# Patient Record
Sex: FEMALE | Race: WHITE | NOT HISPANIC OR LATINO
[De-identification: names, ages, dates, MRNs, and addresses within clinical notes are randomized per-mention and may not be internally consistent; named-entity substitution may affect disease eponyms.]

---

## 2017-01-23 ENCOUNTER — NON-APPOINTMENT (OUTPATIENT)
Age: 47
End: 2017-01-23

## 2017-01-23 ENCOUNTER — APPOINTMENT (OUTPATIENT)
Dept: CARDIOLOGY | Facility: CLINIC | Age: 47
End: 2017-01-23

## 2017-01-23 VITALS — WEIGHT: 293 LBS | BODY MASS INDEX: 50.02 KG/M2 | OXYGEN SATURATION: 94 % | HEIGHT: 64 IN | HEART RATE: 108 BPM

## 2017-01-23 VITALS — SYSTOLIC BLOOD PRESSURE: 110 MMHG | DIASTOLIC BLOOD PRESSURE: 70 MMHG

## 2017-01-23 DIAGNOSIS — E78.5 HYPERLIPIDEMIA, UNSPECIFIED: ICD-10-CM

## 2017-01-27 ENCOUNTER — INPATIENT (INPATIENT)
Facility: HOSPITAL | Age: 47
LOS: 3 days | Discharge: HOME | DRG: 202 | End: 2017-01-31
Attending: INTERNAL MEDICINE | Admitting: INTERNAL MEDICINE
Payer: COMMERCIAL

## 2017-01-27 VITALS
TEMPERATURE: 98 F | HEART RATE: 119 BPM | OXYGEN SATURATION: 94 % | HEIGHT: 64 IN | RESPIRATION RATE: 24 BRPM | WEIGHT: 293 LBS | DIASTOLIC BLOOD PRESSURE: 82 MMHG | SYSTOLIC BLOOD PRESSURE: 113 MMHG

## 2017-01-27 DIAGNOSIS — J45.901 UNSPECIFIED ASTHMA WITH (ACUTE) EXACERBATION: ICD-10-CM

## 2017-01-27 LAB
ALBUMIN SERPL ELPH-MCNC: 3.1 G/DL — LOW (ref 3.3–5)
ALP SERPL-CCNC: 126 U/L — HIGH (ref 40–120)
ALT FLD-CCNC: 41 U/L — SIGNIFICANT CHANGE UP (ref 12–78)
ANION GAP SERPL CALC-SCNC: 12 MMOL/L — SIGNIFICANT CHANGE UP (ref 5–17)
APTT BLD: 31.8 SEC — SIGNIFICANT CHANGE UP (ref 27.5–37.4)
AST SERPL-CCNC: 19 U/L — SIGNIFICANT CHANGE UP (ref 15–37)
BASOPHILS # BLD AUTO: 0.1 K/UL — SIGNIFICANT CHANGE UP (ref 0–0.2)
BASOPHILS NFR BLD AUTO: 0.9 % — SIGNIFICANT CHANGE UP (ref 0–2)
BILIRUB SERPL-MCNC: 0.5 MG/DL — SIGNIFICANT CHANGE UP (ref 0.2–1.2)
BUN SERPL-MCNC: 14 MG/DL — SIGNIFICANT CHANGE UP (ref 7–23)
CALCIUM SERPL-MCNC: 8.4 MG/DL — LOW (ref 8.5–10.1)
CHLORIDE SERPL-SCNC: 97 MMOL/L — SIGNIFICANT CHANGE UP (ref 96–108)
CO2 SERPL-SCNC: 24 MMOL/L — SIGNIFICANT CHANGE UP (ref 22–31)
CREAT SERPL-MCNC: 0.64 MG/DL — SIGNIFICANT CHANGE UP (ref 0.5–1.3)
EOSINOPHIL # BLD AUTO: 0.3 K/UL — SIGNIFICANT CHANGE UP (ref 0–0.5)
EOSINOPHIL NFR BLD AUTO: 2.8 % — SIGNIFICANT CHANGE UP (ref 0–6)
GLUCOSE SERPL-MCNC: 128 MG/DL — HIGH (ref 70–99)
HCT VFR BLD CALC: 41 % — SIGNIFICANT CHANGE UP (ref 34.5–45)
HGB BLD-MCNC: 13.3 G/DL — SIGNIFICANT CHANGE UP (ref 11.5–15.5)
INR BLD: 1.2 RATIO — HIGH (ref 0.88–1.16)
LYMPHOCYTES # BLD AUTO: 1.8 K/UL — SIGNIFICANT CHANGE UP (ref 1–3.3)
LYMPHOCYTES # BLD AUTO: 15.6 % — SIGNIFICANT CHANGE UP (ref 13–44)
MCHC RBC-ENTMCNC: 26.8 PG — LOW (ref 27–34)
MCHC RBC-ENTMCNC: 32.5 GM/DL — SIGNIFICANT CHANGE UP (ref 32–36)
MCV RBC AUTO: 82.4 FL — SIGNIFICANT CHANGE UP (ref 80–100)
MONOCYTES # BLD AUTO: 0.5 K/UL — SIGNIFICANT CHANGE UP (ref 0–0.9)
MONOCYTES NFR BLD AUTO: 4.1 % — SIGNIFICANT CHANGE UP (ref 1–9)
NEUTROPHILS # BLD AUTO: 8.8 K/UL — HIGH (ref 1.8–7.4)
NEUTROPHILS NFR BLD AUTO: 76.6 % — SIGNIFICANT CHANGE UP (ref 43–77)
PLATELET # BLD AUTO: 360 K/UL — SIGNIFICANT CHANGE UP (ref 150–400)
POTASSIUM SERPL-MCNC: 3.7 MMOL/L — SIGNIFICANT CHANGE UP (ref 3.5–5.3)
POTASSIUM SERPL-SCNC: 3.7 MMOL/L — SIGNIFICANT CHANGE UP (ref 3.5–5.3)
PROT SERPL-MCNC: 6.8 G/DL — SIGNIFICANT CHANGE UP (ref 6–8.3)
PROTHROM AB SERPL-ACNC: 13.3 SEC — HIGH (ref 10–13.1)
RAPID RVP RESULT: SIGNIFICANT CHANGE UP
RBC # BLD: 4.98 M/UL — SIGNIFICANT CHANGE UP (ref 3.8–5.2)
RBC # FLD: 14 % — SIGNIFICANT CHANGE UP (ref 10.3–14.5)
SODIUM SERPL-SCNC: 133 MMOL/L — LOW (ref 135–145)
WBC # BLD: 11.4 K/UL — HIGH (ref 3.8–10.5)
WBC # FLD AUTO: 11.4 K/UL — HIGH (ref 3.8–10.5)

## 2017-01-27 PROCEDURE — 99285 EMERGENCY DEPT VISIT HI MDM: CPT

## 2017-01-27 PROCEDURE — 93010 ELECTROCARDIOGRAM REPORT: CPT

## 2017-01-27 PROCEDURE — 71010: CPT | Mod: 26

## 2017-01-27 RX ORDER — DEXTROSE 50 % IN WATER 50 %
25 SYRINGE (ML) INTRAVENOUS ONCE
Qty: 0 | Refills: 0 | Status: DISCONTINUED | OUTPATIENT
Start: 2017-01-27 | End: 2017-01-31

## 2017-01-27 RX ORDER — DEXTROSE 50 % IN WATER 50 %
1 SYRINGE (ML) INTRAVENOUS ONCE
Qty: 0 | Refills: 0 | Status: DISCONTINUED | OUTPATIENT
Start: 2017-01-27 | End: 2017-01-31

## 2017-01-27 RX ORDER — SIMVASTATIN 20 MG/1
20 TABLET, FILM COATED ORAL AT BEDTIME
Qty: 0 | Refills: 0 | Status: DISCONTINUED | OUTPATIENT
Start: 2017-01-27 | End: 2017-01-31

## 2017-01-27 RX ORDER — INSULIN GLARGINE 100 [IU]/ML
55 INJECTION, SOLUTION SUBCUTANEOUS EVERY MORNING
Qty: 0 | Refills: 0 | Status: DISCONTINUED | OUTPATIENT
Start: 2017-01-28 | End: 2017-01-31

## 2017-01-27 RX ORDER — ALBUTEROL 90 UG/1
2.5 AEROSOL, METERED ORAL ONCE
Qty: 0 | Refills: 0 | Status: COMPLETED | OUTPATIENT
Start: 2017-01-27 | End: 2017-01-27

## 2017-01-27 RX ORDER — INSULIN LISPRO 100/ML
VIAL (ML) SUBCUTANEOUS
Qty: 0 | Refills: 0 | Status: DISCONTINUED | OUTPATIENT
Start: 2017-01-27 | End: 2017-01-31

## 2017-01-27 RX ORDER — LISINOPRIL 2.5 MG/1
20 TABLET ORAL DAILY
Qty: 0 | Refills: 0 | Status: DISCONTINUED | OUTPATIENT
Start: 2017-01-27 | End: 2017-01-27

## 2017-01-27 RX ORDER — IPRATROPIUM/ALBUTEROL SULFATE 18-103MCG
3 AEROSOL WITH ADAPTER (GRAM) INHALATION ONCE
Qty: 0 | Refills: 0 | Status: COMPLETED | OUTPATIENT
Start: 2017-01-27 | End: 2017-01-27

## 2017-01-27 RX ORDER — ENOXAPARIN SODIUM 100 MG/ML
40 INJECTION SUBCUTANEOUS EVERY 24 HOURS
Qty: 0 | Refills: 0 | Status: DISCONTINUED | OUTPATIENT
Start: 2017-01-27 | End: 2017-01-29

## 2017-01-27 RX ORDER — MAGNESIUM SULFATE 500 MG/ML
2 VIAL (ML) INJECTION ONCE
Qty: 0 | Refills: 0 | Status: COMPLETED | OUTPATIENT
Start: 2017-01-27 | End: 2017-01-27

## 2017-01-27 RX ORDER — CARVEDILOL PHOSPHATE 80 MG/1
25 CAPSULE, EXTENDED RELEASE ORAL EVERY 12 HOURS
Qty: 0 | Refills: 0 | Status: DISCONTINUED | OUTPATIENT
Start: 2017-01-27 | End: 2017-01-28

## 2017-01-27 RX ORDER — ALBUTEROL 90 UG/1
2.5 AEROSOL, METERED ORAL
Qty: 0 | Refills: 0 | Status: DISCONTINUED | OUTPATIENT
Start: 2017-01-27 | End: 2017-01-31

## 2017-01-27 RX ORDER — CEFTRIAXONE 500 MG/1
1 INJECTION, POWDER, FOR SOLUTION INTRAMUSCULAR; INTRAVENOUS ONCE
Qty: 0 | Refills: 0 | Status: COMPLETED | OUTPATIENT
Start: 2017-01-27 | End: 2017-01-27

## 2017-01-27 RX ORDER — INSULIN GLARGINE 100 [IU]/ML
55 INJECTION, SOLUTION SUBCUTANEOUS AT BEDTIME
Qty: 0 | Refills: 0 | Status: DISCONTINUED | OUTPATIENT
Start: 2017-01-27 | End: 2017-01-29

## 2017-01-27 RX ORDER — ALBUTEROL 90 UG/1
2 AEROSOL, METERED ORAL EVERY 6 HOURS
Qty: 0 | Refills: 0 | Status: DISCONTINUED | OUTPATIENT
Start: 2017-01-27 | End: 2017-01-28

## 2017-01-27 RX ORDER — ASPIRIN/CALCIUM CARB/MAGNESIUM 324 MG
81 TABLET ORAL DAILY
Qty: 0 | Refills: 0 | Status: DISCONTINUED | OUTPATIENT
Start: 2017-01-27 | End: 2017-01-31

## 2017-01-27 RX ORDER — SODIUM CHLORIDE 9 MG/ML
1000 INJECTION, SOLUTION INTRAVENOUS
Qty: 0 | Refills: 0 | Status: DISCONTINUED | OUTPATIENT
Start: 2017-01-27 | End: 2017-01-31

## 2017-01-27 RX ORDER — LISINOPRIL 2.5 MG/1
20 TABLET ORAL DAILY
Qty: 0 | Refills: 0 | Status: DISCONTINUED | OUTPATIENT
Start: 2017-01-27 | End: 2017-01-31

## 2017-01-27 RX ORDER — LORATADINE 10 MG/1
10 TABLET ORAL DAILY
Qty: 0 | Refills: 0 | Status: DISCONTINUED | OUTPATIENT
Start: 2017-01-27 | End: 2017-01-31

## 2017-01-27 RX ORDER — MONTELUKAST 4 MG/1
10 TABLET, CHEWABLE ORAL DAILY
Qty: 0 | Refills: 0 | Status: DISCONTINUED | OUTPATIENT
Start: 2017-01-27 | End: 2017-01-31

## 2017-01-27 RX ORDER — PANTOPRAZOLE SODIUM 20 MG/1
40 TABLET, DELAYED RELEASE ORAL
Qty: 0 | Refills: 0 | Status: DISCONTINUED | OUTPATIENT
Start: 2017-01-27 | End: 2017-01-31

## 2017-01-27 RX ORDER — FLUTICASONE PROPIONATE 50 MCG
2 SPRAY, SUSPENSION NASAL
Qty: 0 | Refills: 0 | Status: DISCONTINUED | OUTPATIENT
Start: 2017-01-27 | End: 2017-01-31

## 2017-01-27 RX ORDER — AZITHROMYCIN 500 MG/1
500 TABLET, FILM COATED ORAL ONCE
Qty: 0 | Refills: 0 | Status: COMPLETED | OUTPATIENT
Start: 2017-01-27 | End: 2017-01-27

## 2017-01-27 RX ORDER — SIMVASTATIN 20 MG/1
40 TABLET, FILM COATED ORAL AT BEDTIME
Qty: 0 | Refills: 0 | Status: DISCONTINUED | OUTPATIENT
Start: 2017-01-27 | End: 2017-01-27

## 2017-01-27 RX ORDER — GLUCAGON INJECTION, SOLUTION 0.5 MG/.1ML
1 INJECTION, SOLUTION SUBCUTANEOUS ONCE
Qty: 0 | Refills: 0 | Status: DISCONTINUED | OUTPATIENT
Start: 2017-01-27 | End: 2017-01-31

## 2017-01-27 RX ORDER — DEXTROSE 50 % IN WATER 50 %
12.5 SYRINGE (ML) INTRAVENOUS ONCE
Qty: 0 | Refills: 0 | Status: DISCONTINUED | OUTPATIENT
Start: 2017-01-27 | End: 2017-01-31

## 2017-01-27 RX ORDER — MORPHINE SULFATE 50 MG/1
4 CAPSULE, EXTENDED RELEASE ORAL ONCE
Qty: 0 | Refills: 0 | Status: DISCONTINUED | OUTPATIENT
Start: 2017-01-27 | End: 2017-01-27

## 2017-01-27 RX ADMIN — CARVEDILOL PHOSPHATE 25 MILLIGRAM(S): 80 CAPSULE, EXTENDED RELEASE ORAL at 22:22

## 2017-01-27 RX ADMIN — PANTOPRAZOLE SODIUM 40 MILLIGRAM(S): 20 TABLET, DELAYED RELEASE ORAL at 21:57

## 2017-01-27 RX ADMIN — INSULIN GLARGINE 55 UNIT(S): 100 INJECTION, SOLUTION SUBCUTANEOUS at 21:56

## 2017-01-27 RX ADMIN — ALBUTEROL 2.5 MILLIGRAM(S): 90 AEROSOL, METERED ORAL at 17:52

## 2017-01-27 RX ADMIN — SIMVASTATIN 20 MILLIGRAM(S): 20 TABLET, FILM COATED ORAL at 21:57

## 2017-01-27 RX ADMIN — MONTELUKAST 10 MILLIGRAM(S): 4 TABLET, CHEWABLE ORAL at 21:57

## 2017-01-27 RX ADMIN — LISINOPRIL 20 MILLIGRAM(S): 2.5 TABLET ORAL at 21:56

## 2017-01-27 RX ADMIN — Medication 3 MILLILITER(S): at 17:30

## 2017-01-27 RX ADMIN — ENOXAPARIN SODIUM 40 MILLIGRAM(S): 100 INJECTION SUBCUTANEOUS at 21:57

## 2017-01-27 RX ADMIN — LORATADINE 10 MILLIGRAM(S): 10 TABLET ORAL at 22:22

## 2017-01-27 RX ADMIN — Medication 50 GRAM(S): at 17:30

## 2017-01-27 RX ADMIN — MORPHINE SULFATE 4 MILLIGRAM(S): 50 CAPSULE, EXTENDED RELEASE ORAL at 18:41

## 2017-01-27 RX ADMIN — Medication 81 MILLIGRAM(S): at 21:57

## 2017-01-27 RX ADMIN — ALBUTEROL 2.5 MILLIGRAM(S): 90 AEROSOL, METERED ORAL at 17:51

## 2017-01-27 RX ADMIN — CEFTRIAXONE 100 GRAM(S): 500 INJECTION, POWDER, FOR SOLUTION INTRAMUSCULAR; INTRAVENOUS at 18:18

## 2017-01-27 RX ADMIN — Medication 2 SPRAY(S): at 22:21

## 2017-01-27 RX ADMIN — Medication 40 MILLIGRAM(S): at 17:15

## 2017-01-27 RX ADMIN — ALBUTEROL 2.5 MILLIGRAM(S): 90 AEROSOL, METERED ORAL at 21:17

## 2017-01-27 RX ADMIN — AZITHROMYCIN 255 MILLIGRAM(S): 500 TABLET, FILM COATED ORAL at 18:30

## 2017-01-27 NOTE — ED PROVIDER NOTE - PSH
H/O ovarian cystectomy    History of Cholecystectomy    History of tonsillectomy    Salpingo-oophoritis  oophrectomy  Status Post Nissen Fundoplication (with Gastrostomy Tube Placement)

## 2017-01-27 NOTE — ED PROVIDER NOTE - OBJECTIVE STATEMENT
46 year old female with a PMH of asthma comes to the ER with complaints of shortness of breath. States that she was recently diagnosed with pneumonia. Has been having worsening shortness of breath for the past week despite steroid use and inhaler usage. Patient has a history of 21 intubations in the past for her asthma exacerbations.

## 2017-01-27 NOTE — ED PROVIDER NOTE - PMH
Asthma  intubated in the past  CVA (cerebral vascular accident)    Diabetes mellitus type 2 in obese    Essential hypertension, hypertension with unspecified goal    GERD (gastroesophageal reflux disease)    Migraine    MTHFR (methylene THF reductase) deficiency and homocystinuria    Obesity    Polycystic disease, ovaries    Prinzmetal Angina

## 2017-01-27 NOTE — ED ADULT NURSE NOTE - OBJECTIVE STATEMENT
Pt arrived c/o shortness of breath. Pt has labored breathing, speaking in short sentences. Dr Mcmullen at bedside, respiratory at bedside. Nebulizers given per order. Pt placed on cardiac monitor, on continuous pulse ox. O2 98% on nebs, pt placed on bipap while neb in progress by respiratory. IV started, unable to draw bloods, lab called, will come draw. Ordered medication given, magnesium infusing per order. Close monitoring continues.

## 2017-01-28 DIAGNOSIS — J45.901 UNSPECIFIED ASTHMA WITH (ACUTE) EXACERBATION: ICD-10-CM

## 2017-01-28 LAB
ANION GAP SERPL CALC-SCNC: 11 MMOL/L — SIGNIFICANT CHANGE UP (ref 5–17)
BUN SERPL-MCNC: 17 MG/DL — SIGNIFICANT CHANGE UP (ref 7–23)
CALCIUM SERPL-MCNC: 8.1 MG/DL — LOW (ref 8.5–10.1)
CHLORIDE SERPL-SCNC: 102 MMOL/L — SIGNIFICANT CHANGE UP (ref 96–108)
CO2 SERPL-SCNC: 23 MMOL/L — SIGNIFICANT CHANGE UP (ref 22–31)
CREAT SERPL-MCNC: 0.73 MG/DL — SIGNIFICANT CHANGE UP (ref 0.5–1.3)
GLUCOSE SERPL-MCNC: 340 MG/DL — HIGH (ref 70–99)
GRAM STN FLD: SIGNIFICANT CHANGE UP
HBA1C BLD-MCNC: 8.2 % — HIGH (ref 4–5.6)
HCT VFR BLD CALC: 37.4 % — SIGNIFICANT CHANGE UP (ref 34.5–45)
HGB BLD-MCNC: 12.2 G/DL — SIGNIFICANT CHANGE UP (ref 11.5–15.5)
MAGNESIUM SERPL-MCNC: 2.7 MG/DL — HIGH (ref 1.8–2.4)
MCHC RBC-ENTMCNC: 26.7 PG — LOW (ref 27–34)
MCHC RBC-ENTMCNC: 32.6 GM/DL — SIGNIFICANT CHANGE UP (ref 32–36)
MCV RBC AUTO: 81.9 FL — SIGNIFICANT CHANGE UP (ref 80–100)
PHOSPHATE SERPL-MCNC: 3.7 MG/DL — SIGNIFICANT CHANGE UP (ref 2.5–4.5)
PLATELET # BLD AUTO: 320 K/UL — SIGNIFICANT CHANGE UP (ref 150–400)
POTASSIUM SERPL-MCNC: 4.5 MMOL/L — SIGNIFICANT CHANGE UP (ref 3.5–5.3)
POTASSIUM SERPL-SCNC: 4.5 MMOL/L — SIGNIFICANT CHANGE UP (ref 3.5–5.3)
RBC # BLD: 4.56 M/UL — SIGNIFICANT CHANGE UP (ref 3.8–5.2)
RBC # FLD: 14 % — SIGNIFICANT CHANGE UP (ref 10.3–14.5)
SODIUM SERPL-SCNC: 136 MMOL/L — SIGNIFICANT CHANGE UP (ref 135–145)
SPECIMEN SOURCE: SIGNIFICANT CHANGE UP
WBC # BLD: 9.3 K/UL — SIGNIFICANT CHANGE UP (ref 3.8–10.5)
WBC # FLD AUTO: 9.3 K/UL — SIGNIFICANT CHANGE UP (ref 3.8–10.5)

## 2017-01-28 PROCEDURE — 99291 CRITICAL CARE FIRST HOUR: CPT

## 2017-01-28 RX ORDER — ALBUTEROL 90 UG/1
2.5 AEROSOL, METERED ORAL EVERY 4 HOURS
Qty: 0 | Refills: 0 | Status: DISCONTINUED | OUTPATIENT
Start: 2017-01-28 | End: 2017-01-31

## 2017-01-28 RX ORDER — SODIUM CHLORIDE 9 MG/ML
250 INJECTION INTRAMUSCULAR; INTRAVENOUS; SUBCUTANEOUS ONCE
Qty: 0 | Refills: 0 | Status: COMPLETED | OUTPATIENT
Start: 2017-01-28 | End: 2017-01-28

## 2017-01-28 RX ORDER — CARVEDILOL PHOSPHATE 80 MG/1
25 CAPSULE, EXTENDED RELEASE ORAL AT BEDTIME
Qty: 0 | Refills: 0 | Status: DISCONTINUED | OUTPATIENT
Start: 2017-01-28 | End: 2017-01-30

## 2017-01-28 RX ORDER — INSULIN LISPRO 100/ML
4 VIAL (ML) SUBCUTANEOUS ONCE
Qty: 0 | Refills: 0 | Status: COMPLETED | OUTPATIENT
Start: 2017-01-28 | End: 2017-01-28

## 2017-01-28 RX ORDER — CEFTRIAXONE 500 MG/1
1 INJECTION, POWDER, FOR SOLUTION INTRAMUSCULAR; INTRAVENOUS EVERY 24 HOURS
Qty: 0 | Refills: 0 | Status: DISCONTINUED | OUTPATIENT
Start: 2017-01-28 | End: 2017-01-31

## 2017-01-28 RX ORDER — ALBUTEROL 90 UG/1
2.5 AEROSOL, METERED ORAL
Qty: 0 | Refills: 0 | Status: DISCONTINUED | OUTPATIENT
Start: 2017-01-28 | End: 2017-01-29

## 2017-01-28 RX ADMIN — Medication 6: at 09:30

## 2017-01-28 RX ADMIN — ALBUTEROL 2.5 MILLIGRAM(S): 90 AEROSOL, METERED ORAL at 16:49

## 2017-01-28 RX ADMIN — MONTELUKAST 10 MILLIGRAM(S): 4 TABLET, CHEWABLE ORAL at 21:47

## 2017-01-28 RX ADMIN — Medication 2 SPRAY(S): at 06:08

## 2017-01-28 RX ADMIN — Medication 81 MILLIGRAM(S): at 21:47

## 2017-01-28 RX ADMIN — Medication 4 UNIT(S): at 22:52

## 2017-01-28 RX ADMIN — SIMVASTATIN 20 MILLIGRAM(S): 20 TABLET, FILM COATED ORAL at 21:47

## 2017-01-28 RX ADMIN — ALBUTEROL 2.5 MILLIGRAM(S): 90 AEROSOL, METERED ORAL at 02:23

## 2017-01-28 RX ADMIN — Medication 6: at 17:31

## 2017-01-28 RX ADMIN — Medication 10: at 12:25

## 2017-01-28 RX ADMIN — Medication 40 MILLIGRAM(S): at 09:30

## 2017-01-28 RX ADMIN — Medication 40 MILLIGRAM(S): at 00:27

## 2017-01-28 RX ADMIN — LORATADINE 10 MILLIGRAM(S): 10 TABLET ORAL at 21:47

## 2017-01-28 RX ADMIN — ALBUTEROL 2.5 MILLIGRAM(S): 90 AEROSOL, METERED ORAL at 23:47

## 2017-01-28 RX ADMIN — ENOXAPARIN SODIUM 40 MILLIGRAM(S): 100 INJECTION SUBCUTANEOUS at 21:45

## 2017-01-28 RX ADMIN — ALBUTEROL 2 PUFF(S): 90 AEROSOL, METERED ORAL at 06:09

## 2017-01-28 RX ADMIN — ALBUTEROL 2.5 MILLIGRAM(S): 90 AEROSOL, METERED ORAL at 00:07

## 2017-01-28 RX ADMIN — INSULIN GLARGINE 55 UNIT(S): 100 INJECTION, SOLUTION SUBCUTANEOUS at 21:46

## 2017-01-28 RX ADMIN — ALBUTEROL 2.5 MILLIGRAM(S): 90 AEROSOL, METERED ORAL at 05:21

## 2017-01-28 RX ADMIN — ALBUTEROL 2.5 MILLIGRAM(S): 90 AEROSOL, METERED ORAL at 19:19

## 2017-01-28 RX ADMIN — CEFTRIAXONE 100 GRAM(S): 500 INJECTION, POWDER, FOR SOLUTION INTRAMUSCULAR; INTRAVENOUS at 17:33

## 2017-01-28 RX ADMIN — Medication 40 MILLIGRAM(S): at 17:33

## 2017-01-28 RX ADMIN — PANTOPRAZOLE SODIUM 40 MILLIGRAM(S): 20 TABLET, DELAYED RELEASE ORAL at 06:08

## 2017-01-28 RX ADMIN — Medication 2 SPRAY(S): at 17:32

## 2017-01-28 RX ADMIN — CARVEDILOL PHOSPHATE 25 MILLIGRAM(S): 80 CAPSULE, EXTENDED RELEASE ORAL at 21:47

## 2017-01-28 RX ADMIN — ALBUTEROL 2.5 MILLIGRAM(S): 90 AEROSOL, METERED ORAL at 11:58

## 2017-01-28 RX ADMIN — SODIUM CHLORIDE 500 MILLILITER(S): 9 INJECTION INTRAMUSCULAR; INTRAVENOUS; SUBCUTANEOUS at 02:46

## 2017-01-28 RX ADMIN — INSULIN GLARGINE 55 UNIT(S): 100 INJECTION, SOLUTION SUBCUTANEOUS at 09:30

## 2017-01-28 RX ADMIN — ALBUTEROL 2 PUFF(S): 90 AEROSOL, METERED ORAL at 00:33

## 2017-01-28 RX ADMIN — LISINOPRIL 20 MILLIGRAM(S): 2.5 TABLET ORAL at 21:47

## 2017-01-28 RX ADMIN — SODIUM CHLORIDE 500 MILLILITER(S): 9 INJECTION INTRAMUSCULAR; INTRAVENOUS; SUBCUTANEOUS at 02:25

## 2017-01-28 NOTE — H&P ADULT. - HISTORY OF PRESENT ILLNESS
pt with exacerbation of her asthma has had multiple intubations and frequent hospitalizations. pt is a brittle diabetic as well with poor control

## 2017-01-28 NOTE — DIETITIAN INITIAL EVALUATION ADULT. - OTHER INFO
patient reports is hungry on steroids. 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 states is allergic to artificial sweetners needs regular diet knows what to avoid . states A1c last was 7.5% weight loss over 3 weeks reported 16# cutting portion size per patient .

## 2017-01-28 NOTE — DIETITIAN INITIAL EVALUATION ADULT. - NS AS NUTRI INTERV MEALS SNACK
[place] - modified diet/Fat - modified diet/Other (specify)/advance to clear liquids to dash tlc as appropriate

## 2017-01-29 LAB
ANION GAP SERPL CALC-SCNC: 11 MMOL/L — SIGNIFICANT CHANGE UP (ref 5–17)
BUN SERPL-MCNC: 21 MG/DL — SIGNIFICANT CHANGE UP (ref 7–23)
CALCIUM SERPL-MCNC: 7.9 MG/DL — LOW (ref 8.5–10.1)
CHLORIDE SERPL-SCNC: 106 MMOL/L — SIGNIFICANT CHANGE UP (ref 96–108)
CO2 SERPL-SCNC: 21 MMOL/L — LOW (ref 22–31)
CREAT SERPL-MCNC: 0.68 MG/DL — SIGNIFICANT CHANGE UP (ref 0.5–1.3)
GLUCOSE SERPL-MCNC: 394 MG/DL — HIGH (ref 70–99)
HCT VFR BLD CALC: 35.6 % — SIGNIFICANT CHANGE UP (ref 34.5–45)
HGB BLD-MCNC: 11.6 G/DL — SIGNIFICANT CHANGE UP (ref 11.5–15.5)
LEGIONELLA AG UR QL: NEGATIVE — SIGNIFICANT CHANGE UP
MAGNESIUM SERPL-MCNC: 2.6 MG/DL — HIGH (ref 1.8–2.4)
MCHC RBC-ENTMCNC: 27 PG — SIGNIFICANT CHANGE UP (ref 27–34)
MCHC RBC-ENTMCNC: 32.6 GM/DL — SIGNIFICANT CHANGE UP (ref 32–36)
MCV RBC AUTO: 82.8 FL — SIGNIFICANT CHANGE UP (ref 80–100)
PHOSPHATE SERPL-MCNC: 2.2 MG/DL — LOW (ref 2.5–4.5)
PLATELET # BLD AUTO: 324 K/UL — SIGNIFICANT CHANGE UP (ref 150–400)
POTASSIUM SERPL-MCNC: 4.5 MMOL/L — SIGNIFICANT CHANGE UP (ref 3.5–5.3)
POTASSIUM SERPL-SCNC: 4.5 MMOL/L — SIGNIFICANT CHANGE UP (ref 3.5–5.3)
RBC # BLD: 4.3 M/UL — SIGNIFICANT CHANGE UP (ref 3.8–5.2)
RBC # FLD: 13.9 % — SIGNIFICANT CHANGE UP (ref 10.3–14.5)
SODIUM SERPL-SCNC: 138 MMOL/L — SIGNIFICANT CHANGE UP (ref 135–145)
WBC # BLD: 15.1 K/UL — HIGH (ref 3.8–10.5)
WBC # FLD AUTO: 15.1 K/UL — HIGH (ref 3.8–10.5)

## 2017-01-29 PROCEDURE — 99291 CRITICAL CARE FIRST HOUR: CPT

## 2017-01-29 PROCEDURE — 99233 SBSQ HOSP IP/OBS HIGH 50: CPT

## 2017-01-29 RX ORDER — DIPHENHYDRAMINE HCL 50 MG
25 CAPSULE ORAL ONCE
Qty: 0 | Refills: 0 | Status: COMPLETED | OUTPATIENT
Start: 2017-01-29 | End: 2017-01-29

## 2017-01-29 RX ORDER — INSULIN LISPRO 100/ML
15 VIAL (ML) SUBCUTANEOUS
Qty: 0 | Refills: 0 | Status: DISCONTINUED | OUTPATIENT
Start: 2017-01-29 | End: 2017-01-31

## 2017-01-29 RX ORDER — POTASSIUM PHOSPHATE, MONOBASIC POTASSIUM PHOSPHATE, DIBASIC 236; 224 MG/ML; MG/ML
15 INJECTION, SOLUTION INTRAVENOUS ONCE
Qty: 0 | Refills: 0 | Status: COMPLETED | OUTPATIENT
Start: 2017-01-29 | End: 2017-01-29

## 2017-01-29 RX ORDER — FLUTICASONE PROPIONATE AND SALMETEROL 50; 250 UG/1; UG/1
1 POWDER ORAL; RESPIRATORY (INHALATION)
Qty: 0 | Refills: 0 | Status: DISCONTINUED | OUTPATIENT
Start: 2017-01-29 | End: 2017-01-31

## 2017-01-29 RX ORDER — INSULIN LISPRO 100/ML
VIAL (ML) SUBCUTANEOUS AT BEDTIME
Qty: 0 | Refills: 0 | Status: DISCONTINUED | OUTPATIENT
Start: 2017-01-29 | End: 2017-01-31

## 2017-01-29 RX ORDER — ENOXAPARIN SODIUM 100 MG/ML
40 INJECTION SUBCUTANEOUS EVERY 12 HOURS
Qty: 0 | Refills: 0 | Status: DISCONTINUED | OUTPATIENT
Start: 2017-01-29 | End: 2017-01-31

## 2017-01-29 RX ORDER — INSULIN GLARGINE 100 [IU]/ML
65 INJECTION, SOLUTION SUBCUTANEOUS AT BEDTIME
Qty: 0 | Refills: 0 | Status: DISCONTINUED | OUTPATIENT
Start: 2017-01-29 | End: 2017-01-31

## 2017-01-29 RX ORDER — ENOXAPARIN SODIUM 100 MG/ML
40 INJECTION SUBCUTANEOUS ONCE
Qty: 0 | Refills: 0 | Status: COMPLETED | OUTPATIENT
Start: 2017-01-29 | End: 2017-01-29

## 2017-01-29 RX ORDER — DIPHENHYDRAMINE HCL 50 MG
100 CAPSULE ORAL ONCE
Qty: 0 | Refills: 0 | Status: COMPLETED | OUTPATIENT
Start: 2017-01-29 | End: 2017-01-29

## 2017-01-29 RX ADMIN — Medication 15 UNIT(S): at 16:41

## 2017-01-29 RX ADMIN — ALBUTEROL 2.5 MILLIGRAM(S): 90 AEROSOL, METERED ORAL at 19:33

## 2017-01-29 RX ADMIN — ALBUTEROL 2.5 MILLIGRAM(S): 90 AEROSOL, METERED ORAL at 06:10

## 2017-01-29 RX ADMIN — SIMVASTATIN 20 MILLIGRAM(S): 20 TABLET, FILM COATED ORAL at 21:47

## 2017-01-29 RX ADMIN — PANTOPRAZOLE SODIUM 40 MILLIGRAM(S): 20 TABLET, DELAYED RELEASE ORAL at 08:03

## 2017-01-29 RX ADMIN — Medication 12: at 11:49

## 2017-01-29 RX ADMIN — Medication 40 MILLIGRAM(S): at 08:03

## 2017-01-29 RX ADMIN — Medication 15 UNIT(S): at 11:49

## 2017-01-29 RX ADMIN — Medication 100 MILLIGRAM(S): at 22:41

## 2017-01-29 RX ADMIN — ENOXAPARIN SODIUM 40 MILLIGRAM(S): 100 INJECTION SUBCUTANEOUS at 11:49

## 2017-01-29 RX ADMIN — Medication 8: at 08:03

## 2017-01-29 RX ADMIN — CARVEDILOL PHOSPHATE 25 MILLIGRAM(S): 80 CAPSULE, EXTENDED RELEASE ORAL at 21:46

## 2017-01-29 RX ADMIN — ENOXAPARIN SODIUM 40 MILLIGRAM(S): 100 INJECTION SUBCUTANEOUS at 18:34

## 2017-01-29 RX ADMIN — Medication 10: at 16:41

## 2017-01-29 RX ADMIN — INSULIN GLARGINE 65 UNIT(S): 100 INJECTION, SOLUTION SUBCUTANEOUS at 21:47

## 2017-01-29 RX ADMIN — Medication 20 MILLIGRAM(S): at 21:46

## 2017-01-29 RX ADMIN — Medication 40 MILLIGRAM(S): at 00:04

## 2017-01-29 RX ADMIN — Medication 25 MILLIGRAM(S): at 11:52

## 2017-01-29 RX ADMIN — Medication 81 MILLIGRAM(S): at 21:46

## 2017-01-29 RX ADMIN — POTASSIUM PHOSPHATE, MONOBASIC POTASSIUM PHOSPHATE, DIBASIC 63.75 MILLIMOLE(S): 236; 224 INJECTION, SOLUTION INTRAVENOUS at 11:47

## 2017-01-29 RX ADMIN — Medication 20 MILLIGRAM(S): at 16:32

## 2017-01-29 RX ADMIN — LISINOPRIL 20 MILLIGRAM(S): 2.5 TABLET ORAL at 21:46

## 2017-01-29 RX ADMIN — Medication 4: at 21:46

## 2017-01-29 RX ADMIN — LORATADINE 10 MILLIGRAM(S): 10 TABLET ORAL at 21:45

## 2017-01-29 RX ADMIN — CEFTRIAXONE 100 GRAM(S): 500 INJECTION, POWDER, FOR SOLUTION INTRAMUSCULAR; INTRAVENOUS at 16:42

## 2017-01-29 RX ADMIN — ALBUTEROL 2.5 MILLIGRAM(S): 90 AEROSOL, METERED ORAL at 23:30

## 2017-01-29 RX ADMIN — Medication 2 SPRAY(S): at 18:34

## 2017-01-29 RX ADMIN — INSULIN GLARGINE 55 UNIT(S): 100 INJECTION, SOLUTION SUBCUTANEOUS at 08:03

## 2017-01-29 RX ADMIN — ALBUTEROL 2.5 MILLIGRAM(S): 90 AEROSOL, METERED ORAL at 11:01

## 2017-01-29 RX ADMIN — ALBUTEROL 2.5 MILLIGRAM(S): 90 AEROSOL, METERED ORAL at 16:34

## 2017-01-29 RX ADMIN — Medication 2 SPRAY(S): at 05:59

## 2017-01-29 RX ADMIN — ALBUTEROL 2.5 MILLIGRAM(S): 90 AEROSOL, METERED ORAL at 04:31

## 2017-01-29 RX ADMIN — MONTELUKAST 10 MILLIGRAM(S): 4 TABLET, CHEWABLE ORAL at 21:46

## 2017-01-30 ENCOUNTER — TRANSCRIPTION ENCOUNTER (OUTPATIENT)
Age: 47
End: 2017-01-30

## 2017-01-30 LAB
ANION GAP SERPL CALC-SCNC: 11 MMOL/L — SIGNIFICANT CHANGE UP (ref 5–17)
BASOPHILS # BLD AUTO: 0 K/UL — SIGNIFICANT CHANGE UP (ref 0–0.2)
BASOPHILS NFR BLD AUTO: 0.3 % — SIGNIFICANT CHANGE UP (ref 0–2)
BUN SERPL-MCNC: 18 MG/DL — SIGNIFICANT CHANGE UP (ref 7–23)
CALCIUM SERPL-MCNC: 8.2 MG/DL — LOW (ref 8.5–10.1)
CHLORIDE SERPL-SCNC: 105 MMOL/L — SIGNIFICANT CHANGE UP (ref 96–108)
CO2 SERPL-SCNC: 23 MMOL/L — SIGNIFICANT CHANGE UP (ref 22–31)
CREAT SERPL-MCNC: 0.61 MG/DL — SIGNIFICANT CHANGE UP (ref 0.5–1.3)
CULTURE RESULTS: SIGNIFICANT CHANGE UP
EOSINOPHIL # BLD AUTO: 0 K/UL — SIGNIFICANT CHANGE UP (ref 0–0.5)
EOSINOPHIL NFR BLD AUTO: 0.1 % — SIGNIFICANT CHANGE UP (ref 0–6)
GLUCOSE SERPL-MCNC: 306 MG/DL — HIGH (ref 70–99)
HCT VFR BLD CALC: 38.1 % — SIGNIFICANT CHANGE UP (ref 34.5–45)
HGB BLD-MCNC: 12.2 G/DL — SIGNIFICANT CHANGE UP (ref 11.5–15.5)
LYMPHOCYTES # BLD AUTO: 1.4 K/UL — SIGNIFICANT CHANGE UP (ref 1–3.3)
LYMPHOCYTES # BLD AUTO: 9 % — LOW (ref 13–44)
MAGNESIUM SERPL-MCNC: 2.5 MG/DL — HIGH (ref 1.8–2.4)
MCHC RBC-ENTMCNC: 26.6 PG — LOW (ref 27–34)
MCHC RBC-ENTMCNC: 32.1 GM/DL — SIGNIFICANT CHANGE UP (ref 32–36)
MCV RBC AUTO: 82.6 FL — SIGNIFICANT CHANGE UP (ref 80–100)
MONOCYTES # BLD AUTO: 0.7 K/UL — SIGNIFICANT CHANGE UP (ref 0–0.9)
MONOCYTES NFR BLD AUTO: 4.4 % — SIGNIFICANT CHANGE UP (ref 1–9)
NEUTROPHILS # BLD AUTO: 13.8 K/UL — HIGH (ref 1.8–7.4)
NEUTROPHILS NFR BLD AUTO: 86.3 % — HIGH (ref 43–77)
PHOSPHATE SERPL-MCNC: 2.2 MG/DL — LOW (ref 2.5–4.5)
PLATELET # BLD AUTO: 377 K/UL — SIGNIFICANT CHANGE UP (ref 150–400)
POTASSIUM SERPL-MCNC: 4.4 MMOL/L — SIGNIFICANT CHANGE UP (ref 3.5–5.3)
POTASSIUM SERPL-SCNC: 4.4 MMOL/L — SIGNIFICANT CHANGE UP (ref 3.5–5.3)
RBC # BLD: 4.61 M/UL — SIGNIFICANT CHANGE UP (ref 3.8–5.2)
RBC # FLD: 14.1 % — SIGNIFICANT CHANGE UP (ref 10.3–14.5)
S PNEUM AG UR QL: NEGATIVE — SIGNIFICANT CHANGE UP
SODIUM SERPL-SCNC: 139 MMOL/L — SIGNIFICANT CHANGE UP (ref 135–145)
SPECIMEN SOURCE: SIGNIFICANT CHANGE UP
WBC # BLD: 16 K/UL — HIGH (ref 3.8–10.5)
WBC # FLD AUTO: 16 K/UL — HIGH (ref 3.8–10.5)

## 2017-01-30 PROCEDURE — 99233 SBSQ HOSP IP/OBS HIGH 50: CPT

## 2017-01-30 PROCEDURE — 99233 SBSQ HOSP IP/OBS HIGH 50: CPT | Mod: GC

## 2017-01-30 RX ORDER — CARVEDILOL PHOSPHATE 80 MG/1
25 CAPSULE, EXTENDED RELEASE ORAL EVERY 12 HOURS
Qty: 0 | Refills: 0 | Status: DISCONTINUED | OUTPATIENT
Start: 2017-01-30 | End: 2017-01-31

## 2017-01-30 RX ORDER — INSULIN LISPRO 100/ML
15 VIAL (ML) SUBCUTANEOUS
Qty: 0 | Refills: 0 | COMMUNITY
Start: 2017-01-30

## 2017-01-30 RX ORDER — CETIRIZINE HYDROCHLORIDE 10 MG/1
1 TABLET ORAL
Qty: 0 | Refills: 0 | COMMUNITY

## 2017-01-30 RX ORDER — FLUTICASONE PROPIONATE 50 MCG
2 SPRAY, SUSPENSION NASAL
Qty: 0 | Refills: 0 | COMMUNITY

## 2017-01-30 RX ORDER — ALBUTEROL 90 UG/1
2 AEROSOL, METERED ORAL
Qty: 0 | Refills: 0 | COMMUNITY

## 2017-01-30 RX ORDER — DIPHENHYDRAMINE HCL 50 MG
100 CAPSULE ORAL
Qty: 0 | Refills: 0 | COMMUNITY

## 2017-01-30 RX ORDER — LORATADINE 10 MG/1
1 TABLET ORAL
Qty: 0 | Refills: 0 | COMMUNITY
Start: 2017-01-30

## 2017-01-30 RX ORDER — INSULIN LISPRO 100/ML
10 VIAL (ML) SUBCUTANEOUS ONCE
Qty: 0 | Refills: 0 | Status: COMPLETED | OUTPATIENT
Start: 2017-01-30 | End: 2017-01-31

## 2017-01-30 RX ORDER — MOMETASONE FUROATE 220 UG/1
1 INHALANT RESPIRATORY (INHALATION)
Qty: 0 | Refills: 0 | COMMUNITY

## 2017-01-30 RX ORDER — POTASSIUM PHOSPHATE, MONOBASIC POTASSIUM PHOSPHATE, DIBASIC 236; 224 MG/ML; MG/ML
15 INJECTION, SOLUTION INTRAVENOUS ONCE
Qty: 0 | Refills: 0 | Status: COMPLETED | OUTPATIENT
Start: 2017-01-30 | End: 2017-01-30

## 2017-01-30 RX ADMIN — Medication 15 UNIT(S): at 12:12

## 2017-01-30 RX ADMIN — ALBUTEROL 2.5 MILLIGRAM(S): 90 AEROSOL, METERED ORAL at 11:29

## 2017-01-30 RX ADMIN — ALBUTEROL 2.5 MILLIGRAM(S): 90 AEROSOL, METERED ORAL at 16:48

## 2017-01-30 RX ADMIN — Medication 15 UNIT(S): at 08:24

## 2017-01-30 RX ADMIN — CEFTRIAXONE 100 GRAM(S): 500 INJECTION, POWDER, FOR SOLUTION INTRAMUSCULAR; INTRAVENOUS at 17:28

## 2017-01-30 RX ADMIN — LORATADINE 10 MILLIGRAM(S): 10 TABLET ORAL at 21:37

## 2017-01-30 RX ADMIN — Medication 20 MILLIGRAM(S): at 06:32

## 2017-01-30 RX ADMIN — SIMVASTATIN 20 MILLIGRAM(S): 20 TABLET, FILM COATED ORAL at 21:37

## 2017-01-30 RX ADMIN — Medication 10: at 12:11

## 2017-01-30 RX ADMIN — INSULIN GLARGINE 65 UNIT(S): 100 INJECTION, SOLUTION SUBCUTANEOUS at 21:38

## 2017-01-30 RX ADMIN — Medication 1 DROP(S): at 17:26

## 2017-01-30 RX ADMIN — ALBUTEROL 2.5 MILLIGRAM(S): 90 AEROSOL, METERED ORAL at 07:45

## 2017-01-30 RX ADMIN — Medication 6: at 08:23

## 2017-01-30 RX ADMIN — CARVEDILOL PHOSPHATE 25 MILLIGRAM(S): 80 CAPSULE, EXTENDED RELEASE ORAL at 17:27

## 2017-01-30 RX ADMIN — ALBUTEROL 2.5 MILLIGRAM(S): 90 AEROSOL, METERED ORAL at 19:47

## 2017-01-30 RX ADMIN — ENOXAPARIN SODIUM 40 MILLIGRAM(S): 100 INJECTION SUBCUTANEOUS at 17:28

## 2017-01-30 RX ADMIN — Medication 15 UNIT(S): at 17:15

## 2017-01-30 RX ADMIN — Medication 8: at 17:15

## 2017-01-30 RX ADMIN — MONTELUKAST 10 MILLIGRAM(S): 4 TABLET, CHEWABLE ORAL at 21:37

## 2017-01-30 RX ADMIN — Medication 81 MILLIGRAM(S): at 21:37

## 2017-01-30 RX ADMIN — INSULIN GLARGINE 55 UNIT(S): 100 INJECTION, SOLUTION SUBCUTANEOUS at 08:24

## 2017-01-30 RX ADMIN — LISINOPRIL 20 MILLIGRAM(S): 2.5 TABLET ORAL at 21:37

## 2017-01-30 RX ADMIN — Medication 2 SPRAY(S): at 17:26

## 2017-01-30 RX ADMIN — ENOXAPARIN SODIUM 40 MILLIGRAM(S): 100 INJECTION SUBCUTANEOUS at 06:33

## 2017-01-30 RX ADMIN — PANTOPRAZOLE SODIUM 40 MILLIGRAM(S): 20 TABLET, DELAYED RELEASE ORAL at 06:32

## 2017-01-30 RX ADMIN — POTASSIUM PHOSPHATE, MONOBASIC POTASSIUM PHOSPHATE, DIBASIC 63.75 MILLIMOLE(S): 236; 224 INJECTION, SOLUTION INTRAVENOUS at 15:19

## 2017-01-30 RX ADMIN — Medication 2 SPRAY(S): at 06:32

## 2017-01-30 NOTE — DISCHARGE NOTE ADULT - MEDICATION SUMMARY - MEDICATIONS TO TAKE
I will START or STAY ON the medications listed below when I get home from the hospital:    predniSONE 20 mg oral tablet  -- 2 tab(s) by mouth once a day  -- Indication: For Asthma exacerbation    aspirin 81 mg oral tablet  -- 1 tab(s) by mouth once a day  -- Indication: For cad    quinapril 20 mg oral tablet  -- 1 tab(s) by mouth once a day  -- Indication: For htn    calcium carbonate  -- 1200 milligram(s) by mouth once a day  -- Indication: For vitamin    Cardizem  mg/24 hours oral capsule, extended release  -- 1 cap(s) by mouth once a day  -- Indication: For rt heart issues    insulin lispro 100 units/mL subcutaneous solution  -- 15 unit(s) subcutaneous 3 times a day (before meals)  -- Indication: For dm    Victoza  -- 1.8 milligram(s) subcutaneous once a day  -- Indication: For dm    Tresiba FlexTouch  -- 112 unit(s) subcutaneous once a day  -- Indication: For dmt2    loratadine 10 mg oral tablet  -- 1 tab(s) by mouth once a day  -- Indication: For Allergies    simvastatin 20 mg oral tablet  -- 1 tab(s) by mouth once a day (at bedtime)  -- Indication: For lipids    carvedilol 25 mg oral tablet  -- 1 tab(s) by mouth 2 times a day  -- Indication: For htn    Stiolto Respimat 2.5 mcg-2.5 mcg inhalation aerosol  -- 2 puff(s) inhaled every 24 hours  -- Indication: For Asthma exacerbation    DuoNeb 0.5 mg-2.5 mg/3 mL inhalation solution  -- 3 milliliter(s) inhaled 4 times a day  -- Indication: For Asthma exacerbation    Lasix 40 mg oral tablet  -- 1 tab(s) by mouth 3 to 4 times a week, As Needed  -- Indication: For rt heart failure    Zaroxolyn  -- 5 milligram(s) by mouth 3 to 4 times a week, As Needed  -- Indication: For edema    Singulair 10 mg oral tablet  -- 1 tab(s) by mouth once a day  -- Indication: For Asthma exacerbation    Omega-3 oral capsule  -- 1200 milligram(s) by mouth once a day  -- Indication: For lipids    Protonix 40 mg oral delayed release tablet  -- 1 tab(s) by mouth once a day  -- Indication: For gerd    Asmanex Twisthaler 60 Dose 220 mcg/inh inhalation aerosol powder  -- 1 puff(s) inhaled once a day (in the evening)  -- Indication: For Asthma exacerbation    Vitamin D3 50,000 intl units oral capsule  -- 1 cap(s) by mouth once a week  -- Indication: For vitamin    folic acid  -- 4 milligram(s) by mouth once a day  -- Indication: For vitamin    biotin  -- 5000 microgram(s) by mouth once a day  -- Indication: For Asthma exacerbation

## 2017-01-30 NOTE — DISCHARGE NOTE ADULT - PATIENT PORTAL LINK FT
“You can access the [employer] Patient Portal, offered by [facility], by registering with the following website: [email or web address]”

## 2017-01-30 NOTE — DISCHARGE NOTE ADULT - SECONDARY DIAGNOSIS.
Diabetes mellitus type 2 in obese H/O ovarian cystectomy Migraine aura without headache Obesity (BMI 30-39.9) Polycystic disease, ovaries Essential hypertension, hypertension with unspecified goal

## 2017-01-30 NOTE — DISCHARGE NOTE ADULT - HOSPITAL COURSE
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

## 2017-01-30 NOTE — DISCHARGE NOTE ADULT - CARE PROVIDER_API CALL
Lance Haywood), Internal Medicine  [address] [place] [address]  Phone: [phone]  Fax: [phone]

## 2017-01-31 VITALS
HEART RATE: 72 BPM | OXYGEN SATURATION: 93 % | RESPIRATION RATE: 24 BRPM | DIASTOLIC BLOOD PRESSURE: 64 MMHG | SYSTOLIC BLOOD PRESSURE: 112 MMHG

## 2017-01-31 LAB
ANION GAP SERPL CALC-SCNC: 11 MMOL/L — SIGNIFICANT CHANGE UP (ref 5–17)
BUN SERPL-MCNC: 18 MG/DL — SIGNIFICANT CHANGE UP (ref 7–23)
CALCIUM SERPL-MCNC: 7.7 MG/DL — LOW (ref 8.5–10.1)
CHLORIDE SERPL-SCNC: 103 MMOL/L — SIGNIFICANT CHANGE UP (ref 96–108)
CO2 SERPL-SCNC: 24 MMOL/L — SIGNIFICANT CHANGE UP (ref 22–31)
CREAT SERPL-MCNC: 0.6 MG/DL — SIGNIFICANT CHANGE UP (ref 0.5–1.3)
GLUCOSE SERPL-MCNC: 216 MG/DL — HIGH (ref 70–99)
HCT VFR BLD CALC: 37.7 % — SIGNIFICANT CHANGE UP (ref 34.5–45)
HGB BLD-MCNC: 12.5 G/DL — SIGNIFICANT CHANGE UP (ref 11.5–15.5)
MAGNESIUM SERPL-MCNC: 2.1 MG/DL — SIGNIFICANT CHANGE UP (ref 1.8–2.4)
MCHC RBC-ENTMCNC: 27.4 PG — SIGNIFICANT CHANGE UP (ref 27–34)
MCHC RBC-ENTMCNC: 33.2 GM/DL — SIGNIFICANT CHANGE UP (ref 32–36)
MCV RBC AUTO: 82.5 FL — SIGNIFICANT CHANGE UP (ref 80–100)
PHOSPHATE SERPL-MCNC: 3 MG/DL — SIGNIFICANT CHANGE UP (ref 2.5–4.5)
PLATELET # BLD AUTO: 374 K/UL — SIGNIFICANT CHANGE UP (ref 150–400)
POTASSIUM SERPL-MCNC: 4.1 MMOL/L — SIGNIFICANT CHANGE UP (ref 3.5–5.3)
POTASSIUM SERPL-SCNC: 4.1 MMOL/L — SIGNIFICANT CHANGE UP (ref 3.5–5.3)
RBC # BLD: 4.57 M/UL — SIGNIFICANT CHANGE UP (ref 3.8–5.2)
RBC # FLD: 13.8 % — SIGNIFICANT CHANGE UP (ref 10.3–14.5)
SODIUM SERPL-SCNC: 138 MMOL/L — SIGNIFICANT CHANGE UP (ref 135–145)
WBC # BLD: 14.1 K/UL — HIGH (ref 3.8–10.5)
WBC # FLD AUTO: 14.1 K/UL — HIGH (ref 3.8–10.5)

## 2017-01-31 PROCEDURE — 85027 COMPLETE CBC AUTOMATED: CPT

## 2017-01-31 PROCEDURE — 99232 SBSQ HOSP IP/OBS MODERATE 35: CPT | Mod: GC

## 2017-01-31 PROCEDURE — 96376 TX/PRO/DX INJ SAME DRUG ADON: CPT

## 2017-01-31 PROCEDURE — 85610 PROTHROMBIN TIME: CPT

## 2017-01-31 PROCEDURE — 99233 SBSQ HOSP IP/OBS HIGH 50: CPT

## 2017-01-31 PROCEDURE — 87486 CHLMYD PNEUM DNA AMP PROBE: CPT

## 2017-01-31 PROCEDURE — 94664 DEMO&/EVAL PT USE INHALER: CPT

## 2017-01-31 PROCEDURE — 96372 THER/PROPH/DIAG INJ SC/IM: CPT | Mod: 59

## 2017-01-31 PROCEDURE — 99285 EMERGENCY DEPT VISIT HI MDM: CPT | Mod: 25

## 2017-01-31 PROCEDURE — 94660 CPAP INITIATION&MGMT: CPT

## 2017-01-31 PROCEDURE — 83036 HEMOGLOBIN GLYCOSYLATED A1C: CPT

## 2017-01-31 PROCEDURE — 87798 DETECT AGENT NOS DNA AMP: CPT

## 2017-01-31 PROCEDURE — 96375 TX/PRO/DX INJ NEW DRUG ADDON: CPT

## 2017-01-31 PROCEDURE — 99221 1ST HOSP IP/OBS SF/LOW 40: CPT

## 2017-01-31 PROCEDURE — 94760 N-INVAS EAR/PLS OXIMETRY 1: CPT

## 2017-01-31 PROCEDURE — 84100 ASSAY OF PHOSPHORUS: CPT

## 2017-01-31 PROCEDURE — 94640 AIRWAY INHALATION TREATMENT: CPT

## 2017-01-31 PROCEDURE — 87449 NOS EACH ORGANISM AG IA: CPT

## 2017-01-31 PROCEDURE — 93005 ELECTROCARDIOGRAM TRACING: CPT

## 2017-01-31 PROCEDURE — 96374 THER/PROPH/DIAG INJ IV PUSH: CPT

## 2017-01-31 PROCEDURE — 87581 M.PNEUMON DNA AMP PROBE: CPT

## 2017-01-31 PROCEDURE — 71045 X-RAY EXAM CHEST 1 VIEW: CPT

## 2017-01-31 PROCEDURE — 87040 BLOOD CULTURE FOR BACTERIA: CPT

## 2017-01-31 PROCEDURE — 87633 RESP VIRUS 12-25 TARGETS: CPT

## 2017-01-31 PROCEDURE — 83735 ASSAY OF MAGNESIUM: CPT

## 2017-01-31 PROCEDURE — 85730 THROMBOPLASTIN TIME PARTIAL: CPT

## 2017-01-31 PROCEDURE — 87070 CULTURE OTHR SPECIMN AEROBIC: CPT

## 2017-01-31 PROCEDURE — 80053 COMPREHEN METABOLIC PANEL: CPT

## 2017-01-31 PROCEDURE — 80048 BASIC METABOLIC PNL TOTAL CA: CPT

## 2017-01-31 RX ADMIN — FLUTICASONE PROPIONATE AND SALMETEROL 1 DOSE(S): 50; 250 POWDER ORAL; RESPIRATORY (INHALATION) at 05:30

## 2017-01-31 RX ADMIN — Medication 40 MILLIGRAM(S): at 05:28

## 2017-01-31 RX ADMIN — ALBUTEROL 2.5 MILLIGRAM(S): 90 AEROSOL, METERED ORAL at 02:03

## 2017-01-31 RX ADMIN — Medication: at 00:06

## 2017-01-31 RX ADMIN — Medication 2: at 08:20

## 2017-01-31 RX ADMIN — Medication 2 SPRAY(S): at 05:29

## 2017-01-31 RX ADMIN — INSULIN GLARGINE 55 UNIT(S): 100 INJECTION, SOLUTION SUBCUTANEOUS at 08:20

## 2017-01-31 RX ADMIN — PANTOPRAZOLE SODIUM 40 MILLIGRAM(S): 20 TABLET, DELAYED RELEASE ORAL at 05:28

## 2017-01-31 RX ADMIN — CARVEDILOL PHOSPHATE 25 MILLIGRAM(S): 80 CAPSULE, EXTENDED RELEASE ORAL at 05:28

## 2017-01-31 RX ADMIN — ALBUTEROL 2.5 MILLIGRAM(S): 90 AEROSOL, METERED ORAL at 07:43

## 2017-01-31 RX ADMIN — Medication 10 UNIT(S): at 00:06

## 2017-01-31 RX ADMIN — ALBUTEROL 2.5 MILLIGRAM(S): 90 AEROSOL, METERED ORAL at 05:25

## 2017-01-31 RX ADMIN — Medication 15 UNIT(S): at 08:19

## 2017-01-31 RX ADMIN — ENOXAPARIN SODIUM 40 MILLIGRAM(S): 100 INJECTION SUBCUTANEOUS at 05:29

## 2017-02-02 DIAGNOSIS — E11.65 TYPE 2 DIABETES MELLITUS WITH HYPERGLYCEMIA: ICD-10-CM

## 2017-02-02 DIAGNOSIS — G43.109 MIGRAINE WITH AURA, NOT INTRACTABLE, WITHOUT STATUS MIGRAINOSUS: ICD-10-CM

## 2017-02-02 DIAGNOSIS — Z79.84 LONG TERM (CURRENT) USE OF ORAL HYPOGLYCEMIC DRUGS: ICD-10-CM

## 2017-02-02 DIAGNOSIS — M19.90 UNSPECIFIED OSTEOARTHRITIS, UNSPECIFIED SITE: ICD-10-CM

## 2017-02-02 DIAGNOSIS — Z79.4 LONG TERM (CURRENT) USE OF INSULIN: ICD-10-CM

## 2017-02-02 DIAGNOSIS — E66.01 MORBID (SEVERE) OBESITY DUE TO EXCESS CALORIES: ICD-10-CM

## 2017-02-02 DIAGNOSIS — Z79.82 LONG TERM (CURRENT) USE OF ASPIRIN: ICD-10-CM

## 2017-02-02 DIAGNOSIS — I10 ESSENTIAL (PRIMARY) HYPERTENSION: ICD-10-CM

## 2017-02-02 DIAGNOSIS — J96.01 ACUTE RESPIRATORY FAILURE WITH HYPOXIA: ICD-10-CM

## 2017-02-02 DIAGNOSIS — Z79.52 LONG TERM (CURRENT) USE OF SYSTEMIC STEROIDS: ICD-10-CM

## 2017-02-02 DIAGNOSIS — J45.51 SEVERE PERSISTENT ASTHMA WITH (ACUTE) EXACERBATION: ICD-10-CM

## 2017-02-02 DIAGNOSIS — Z79.51 LONG TERM (CURRENT) USE OF INHALED STEROIDS: ICD-10-CM

## 2017-02-02 DIAGNOSIS — J18.9 PNEUMONIA, UNSPECIFIED ORGANISM: ICD-10-CM

## 2017-02-02 LAB
CULTURE RESULTS: SIGNIFICANT CHANGE UP
CULTURE RESULTS: SIGNIFICANT CHANGE UP
SPECIMEN SOURCE: SIGNIFICANT CHANGE UP
SPECIMEN SOURCE: SIGNIFICANT CHANGE UP

## 2017-02-03 ENCOUNTER — APPOINTMENT (OUTPATIENT)
Dept: INTERNAL MEDICINE | Facility: CLINIC | Age: 47
End: 2017-02-03

## 2017-02-03 VITALS
HEIGHT: 64 IN | BODY MASS INDEX: 50.02 KG/M2 | DIASTOLIC BLOOD PRESSURE: 80 MMHG | OXYGEN SATURATION: 95 % | SYSTOLIC BLOOD PRESSURE: 112 MMHG | WEIGHT: 293 LBS | RESPIRATION RATE: 12 BRPM | TEMPERATURE: 98.7 F | HEART RATE: 91 BPM

## 2017-02-03 DIAGNOSIS — E11.9 TYPE 2 DIABETES MELLITUS W/OUT COMPLICATIONS: ICD-10-CM

## 2017-02-03 DIAGNOSIS — J45.909 UNSPECIFIED ASTHMA, UNCOMPLICATED: ICD-10-CM

## 2017-02-03 DIAGNOSIS — I20.1 ANGINA PECTORIS WITH DOCUMENTED SPASM: ICD-10-CM

## 2017-04-18 ENCOUNTER — RX RENEWAL (OUTPATIENT)
Age: 47
End: 2017-04-18

## 2017-04-26 ENCOUNTER — RX RENEWAL (OUTPATIENT)
Age: 47
End: 2017-04-26

## 2017-10-13 ENCOUNTER — APPOINTMENT (OUTPATIENT)
Dept: INTERNAL MEDICINE | Facility: CLINIC | Age: 47
End: 2017-10-13

## 2017-12-12 ENCOUNTER — EMERGENCY (EMERGENCY)
Facility: HOSPITAL | Age: 47
LOS: 1 days | Discharge: HOME | End: 2017-12-12
Attending: STUDENT IN AN ORGANIZED HEALTH CARE EDUCATION/TRAINING PROGRAM | Admitting: STUDENT IN AN ORGANIZED HEALTH CARE EDUCATION/TRAINING PROGRAM
Payer: COMMERCIAL

## 2017-12-12 VITALS
TEMPERATURE: 98 F | DIASTOLIC BLOOD PRESSURE: 97 MMHG | RESPIRATION RATE: 15 BRPM | HEIGHT: 64 IN | HEART RATE: 113 BPM | SYSTOLIC BLOOD PRESSURE: 148 MMHG | WEIGHT: 293 LBS | OXYGEN SATURATION: 96 %

## 2017-12-12 PROCEDURE — 99284 EMERGENCY DEPT VISIT MOD MDM: CPT | Mod: 25

## 2017-12-12 NOTE — ED ADULT NURSE NOTE - CHIEF COMPLAINT QUOTE
right neck pain for 3 days, now radiating to the back and left side of neck. patient was seen in [facility] ED today and was discharged with percocet. last percocet taken around 1030pm. no relief from OTC medication

## 2017-12-12 NOTE — ED ADULT TRIAGE NOTE - CHIEF COMPLAINT QUOTE
right neck pain for 3 days, now radiating to the back and left side of neck. patient was seen in [facility] ED today and was discharged with percocet. last percocet taken around 1030pm. no relief from OTC medication

## 2017-12-12 NOTE — ED ADULT NURSE NOTE - CHPI ED SYMPTOMS NEG
no numbness/no fever/no abrasion/no deformity/no weakness/no bruising/no tingling/no back pain/no difficulty bearing weight

## 2017-12-12 NOTE — ED ADULT NURSE NOTE - OBJECTIVE STATEMENT
Pt to ED c/c severe neck pain x several days. Pt was seen earlier at different ED, states pain is worse. No neuro deficits noted

## 2017-12-13 ENCOUNTER — INPATIENT (INPATIENT)
Facility: HOSPITAL | Age: 47
LOS: 3 days | Discharge: LEFT BEFORE DISCHARGE | DRG: 194 | End: 2017-12-17
Attending: FAMILY MEDICINE | Admitting: HOSPITALIST
Payer: COMMERCIAL

## 2017-12-13 VITALS
OXYGEN SATURATION: 95 % | SYSTOLIC BLOOD PRESSURE: 124 MMHG | HEART RATE: 96 BPM | TEMPERATURE: 98 F | DIASTOLIC BLOOD PRESSURE: 84 MMHG | RESPIRATION RATE: 20 BRPM

## 2017-12-13 VITALS
DIASTOLIC BLOOD PRESSURE: 98 MMHG | WEIGHT: 293 LBS | OXYGEN SATURATION: 95 % | HEART RATE: 110 BPM | HEIGHT: 64 IN | RESPIRATION RATE: 16 BRPM | SYSTOLIC BLOOD PRESSURE: 155 MMHG | TEMPERATURE: 100 F

## 2017-12-13 DIAGNOSIS — E78.5 HYPERLIPIDEMIA, UNSPECIFIED: ICD-10-CM

## 2017-12-13 DIAGNOSIS — E72.12 METHYLENETETRAHYDROFOLATE REDUCTASE DEFICIENCY: ICD-10-CM

## 2017-12-13 DIAGNOSIS — J06.9 ACUTE UPPER RESPIRATORY INFECTION, UNSPECIFIED: ICD-10-CM

## 2017-12-13 DIAGNOSIS — K21.9 GASTRO-ESOPHAGEAL REFLUX DISEASE WITHOUT ESOPHAGITIS: ICD-10-CM

## 2017-12-13 DIAGNOSIS — I20.1 ANGINA PECTORIS WITH DOCUMENTED SPASM: ICD-10-CM

## 2017-12-13 DIAGNOSIS — I10 ESSENTIAL (PRIMARY) HYPERTENSION: ICD-10-CM

## 2017-12-13 DIAGNOSIS — E11.69 TYPE 2 DIABETES MELLITUS WITH OTHER SPECIFIED COMPLICATION: ICD-10-CM

## 2017-12-13 DIAGNOSIS — J36 PERITONSILLAR ABSCESS: ICD-10-CM

## 2017-12-13 DIAGNOSIS — M43.6 TORTICOLLIS: ICD-10-CM

## 2017-12-13 DIAGNOSIS — R50.9 FEVER, UNSPECIFIED: ICD-10-CM

## 2017-12-13 DIAGNOSIS — J45.901 UNSPECIFIED ASTHMA WITH (ACUTE) EXACERBATION: ICD-10-CM

## 2017-12-13 DIAGNOSIS — Z29.9 ENCOUNTER FOR PROPHYLACTIC MEASURES, UNSPECIFIED: ICD-10-CM

## 2017-12-13 LAB
ALBUMIN SERPL ELPH-MCNC: 3.4 G/DL — SIGNIFICANT CHANGE UP (ref 3.3–5)
ALP SERPL-CCNC: 133 U/L — HIGH (ref 40–120)
ALT FLD-CCNC: 50 U/L — SIGNIFICANT CHANGE UP (ref 12–78)
ANION GAP SERPL CALC-SCNC: 9 MMOL/L — SIGNIFICANT CHANGE UP (ref 5–17)
APPEARANCE CSF: CLEAR — SIGNIFICANT CHANGE UP
APPEARANCE SPUN FLD: COLORLESS — SIGNIFICANT CHANGE UP
APPEARANCE UR: CLEAR — SIGNIFICANT CHANGE UP
APTT BLD: 33.4 SEC — SIGNIFICANT CHANGE UP (ref 27.5–37.4)
AST SERPL-CCNC: 20 U/L — SIGNIFICANT CHANGE UP (ref 15–37)
BASOPHILS # BLD AUTO: 0.1 K/UL — SIGNIFICANT CHANGE UP (ref 0–0.2)
BASOPHILS NFR BLD AUTO: 1.1 % — SIGNIFICANT CHANGE UP (ref 0–2)
BILIRUB SERPL-MCNC: 0.5 MG/DL — SIGNIFICANT CHANGE UP (ref 0.2–1.2)
BILIRUB UR-MCNC: NEGATIVE — SIGNIFICANT CHANGE UP
BUN SERPL-MCNC: 18 MG/DL — SIGNIFICANT CHANGE UP (ref 7–23)
CALCIUM SERPL-MCNC: 8.6 MG/DL — SIGNIFICANT CHANGE UP (ref 8.5–10.1)
CHLORIDE SERPL-SCNC: 104 MMOL/L — SIGNIFICANT CHANGE UP (ref 96–108)
CK MB BLD-MCNC: 1.6 % — SIGNIFICANT CHANGE UP (ref 0–3.5)
CK MB CFR SERPL CALC: 0.9 NG/ML — SIGNIFICANT CHANGE UP (ref 0–3.6)
CK SERPL-CCNC: 57 U/L — SIGNIFICANT CHANGE UP (ref 26–192)
CO2 SERPL-SCNC: 26 MMOL/L — SIGNIFICANT CHANGE UP (ref 22–31)
COLOR CSF: SIGNIFICANT CHANGE UP
COLOR SPEC: YELLOW — SIGNIFICANT CHANGE UP
CREAT SERPL-MCNC: 0.61 MG/DL — SIGNIFICANT CHANGE UP (ref 0.5–1.3)
DIFF PNL FLD: NEGATIVE — SIGNIFICANT CHANGE UP
EOSINOPHIL # BLD AUTO: 0.3 K/UL — SIGNIFICANT CHANGE UP (ref 0–0.5)
EOSINOPHIL NFR BLD AUTO: 3 % — SIGNIFICANT CHANGE UP (ref 0–6)
GLUCOSE CSF-MCNC: 85 MG/DL — HIGH (ref 40–70)
GLUCOSE SERPL-MCNC: 128 MG/DL — HIGH (ref 70–99)
GLUCOSE UR QL: 100 MG/DL
GRAM STN FLD: SIGNIFICANT CHANGE UP
HCG UR QL: NEGATIVE — SIGNIFICANT CHANGE UP
HCG UR QL: NEGATIVE — SIGNIFICANT CHANGE UP
HCT VFR BLD CALC: 44.9 % — SIGNIFICANT CHANGE UP (ref 34.5–45)
HGB BLD-MCNC: 14.4 G/DL — SIGNIFICANT CHANGE UP (ref 11.5–15.5)
INR BLD: 1.06 RATIO — SIGNIFICANT CHANGE UP (ref 0.88–1.16)
KETONES UR-MCNC: NEGATIVE — SIGNIFICANT CHANGE UP
LACTATE SERPL-SCNC: 1.2 MMOL/L — SIGNIFICANT CHANGE UP (ref 0.7–2)
LEUKOCYTE ESTERASE UR-ACNC: ABNORMAL
LYMPHOCYTES # BLD AUTO: 2.5 K/UL — SIGNIFICANT CHANGE UP (ref 1–3.3)
LYMPHOCYTES # BLD AUTO: 22.4 % — SIGNIFICANT CHANGE UP (ref 13–44)
MCHC RBC-ENTMCNC: 27.1 PG — SIGNIFICANT CHANGE UP (ref 27–34)
MCHC RBC-ENTMCNC: 32 GM/DL — SIGNIFICANT CHANGE UP (ref 32–36)
MCV RBC AUTO: 84.8 FL — SIGNIFICANT CHANGE UP (ref 80–100)
MONOCYTES # BLD AUTO: 0.8 K/UL — SIGNIFICANT CHANGE UP (ref 0–0.9)
MONOCYTES NFR BLD AUTO: 7 % — SIGNIFICANT CHANGE UP (ref 1–9)
NEUTROPHILS # BLD AUTO: 7.5 K/UL — HIGH (ref 1.8–7.4)
NEUTROPHILS # CSF: 0 % — SIGNIFICANT CHANGE UP (ref 0–6)
NEUTROPHILS NFR BLD AUTO: 66.6 % — SIGNIFICANT CHANGE UP (ref 43–77)
NITRITE UR-MCNC: NEGATIVE — SIGNIFICANT CHANGE UP
NRBC NFR CSF: 0 /UL — SIGNIFICANT CHANGE UP (ref 0–5)
PH UR: 5 — SIGNIFICANT CHANGE UP (ref 5–8)
PLATELET # BLD AUTO: 335 K/UL — SIGNIFICANT CHANGE UP (ref 150–400)
POTASSIUM SERPL-MCNC: 4.3 MMOL/L — SIGNIFICANT CHANGE UP (ref 3.5–5.3)
POTASSIUM SERPL-SCNC: 4.3 MMOL/L — SIGNIFICANT CHANGE UP (ref 3.5–5.3)
PROT CSF-MCNC: 29 MG/DL — SIGNIFICANT CHANGE UP (ref 15–45)
PROT SERPL-MCNC: 7.2 G/DL — SIGNIFICANT CHANGE UP (ref 6–8.3)
PROT UR-MCNC: 15 MG/DL
PROTHROM AB SERPL-ACNC: 11.6 SEC — SIGNIFICANT CHANGE UP (ref 9.8–12.7)
RAPID RVP RESULT: DETECTED
RBC # BLD: 5.29 M/UL — HIGH (ref 3.8–5.2)
RBC # CSF: 1 /UL — HIGH (ref 0–0)
RBC # FLD: 13.2 % — SIGNIFICANT CHANGE UP (ref 10.3–14.5)
RV+EV RNA SPEC QL NAA+PROBE: DETECTED
SODIUM SERPL-SCNC: 139 MMOL/L — SIGNIFICANT CHANGE UP (ref 135–145)
SP GR SPEC: 1.02 — SIGNIFICANT CHANGE UP (ref 1.01–1.02)
SPECIMEN SOURCE: SIGNIFICANT CHANGE UP
TROPONIN I SERPL-MCNC: <.015 NG/ML — SIGNIFICANT CHANGE UP (ref 0.01–0.04)
TUBE TYPE: SIGNIFICANT CHANGE UP
UROBILINOGEN FLD QL: NEGATIVE MG/DL — SIGNIFICANT CHANGE UP
WBC # BLD: 11.3 K/UL — HIGH (ref 3.8–10.5)
WBC # FLD AUTO: 11.3 K/UL — HIGH (ref 3.8–10.5)

## 2017-12-13 PROCEDURE — 99285 EMERGENCY DEPT VISIT HI MDM: CPT

## 2017-12-13 PROCEDURE — 70450 CT HEAD/BRAIN W/O DYE: CPT | Mod: 26

## 2017-12-13 PROCEDURE — 70491 CT SOFT TISSUE NECK W/DYE: CPT | Mod: 26

## 2017-12-13 PROCEDURE — 72125 CT NECK SPINE W/O DYE: CPT | Mod: 26

## 2017-12-13 PROCEDURE — 71010: CPT | Mod: 26

## 2017-12-13 PROCEDURE — 81025 URINE PREGNANCY TEST: CPT

## 2017-12-13 PROCEDURE — 62270 DX LMBR SPI PNXR: CPT

## 2017-12-13 PROCEDURE — 96372 THER/PROPH/DIAG INJ SC/IM: CPT | Mod: XU

## 2017-12-13 PROCEDURE — 99223 1ST HOSP IP/OBS HIGH 75: CPT | Mod: AI,GC

## 2017-12-13 PROCEDURE — 94640 AIRWAY INHALATION TREATMENT: CPT

## 2017-12-13 PROCEDURE — 72125 CT NECK SPINE W/O DYE: CPT

## 2017-12-13 PROCEDURE — 99284 EMERGENCY DEPT VISIT MOD MDM: CPT | Mod: 25

## 2017-12-13 PROCEDURE — 77003 FLUOROGUIDE FOR SPINE INJECT: CPT | Mod: 26

## 2017-12-13 RX ORDER — MONTELUKAST 4 MG/1
10 TABLET, CHEWABLE ORAL DAILY
Qty: 0 | Refills: 0 | Status: DISCONTINUED | OUTPATIENT
Start: 2017-12-13 | End: 2017-12-17

## 2017-12-13 RX ORDER — INSULIN GLARGINE 100 [IU]/ML
10 INJECTION, SOLUTION SUBCUTANEOUS EVERY MORNING
Qty: 0 | Refills: 0 | Status: DISCONTINUED | OUTPATIENT
Start: 2017-12-13 | End: 2017-12-14

## 2017-12-13 RX ORDER — INSULIN GLARGINE 100 [IU]/ML
12 INJECTION, SOLUTION SUBCUTANEOUS AT BEDTIME
Qty: 0 | Refills: 0 | Status: DISCONTINUED | OUTPATIENT
Start: 2017-12-13 | End: 2017-12-13

## 2017-12-13 RX ORDER — ALBUTEROL 90 UG/1
2.5 AEROSOL, METERED ORAL ONCE
Qty: 0 | Refills: 0 | Status: COMPLETED | OUTPATIENT
Start: 2017-12-13 | End: 2017-12-13

## 2017-12-13 RX ORDER — CEFTRIAXONE 500 MG/1
2 INJECTION, POWDER, FOR SOLUTION INTRAMUSCULAR; INTRAVENOUS EVERY 24 HOURS
Qty: 0 | Refills: 0 | Status: DISCONTINUED | OUTPATIENT
Start: 2017-12-13 | End: 2017-12-14

## 2017-12-13 RX ORDER — HYDROMORPHONE HYDROCHLORIDE 2 MG/ML
0.5 INJECTION INTRAMUSCULAR; INTRAVENOUS; SUBCUTANEOUS ONCE
Qty: 0 | Refills: 0 | Status: DISCONTINUED | OUTPATIENT
Start: 2017-12-13 | End: 2017-12-13

## 2017-12-13 RX ORDER — CALCIUM CARBONATE 500(1250)
1200 TABLET ORAL DAILY
Qty: 0 | Refills: 0 | Status: DISCONTINUED | OUTPATIENT
Start: 2017-12-13 | End: 2017-12-13

## 2017-12-13 RX ORDER — IPRATROPIUM/ALBUTEROL SULFATE 18-103MCG
3 AEROSOL WITH ADAPTER (GRAM) INHALATION ONCE
Qty: 0 | Refills: 0 | Status: COMPLETED | OUTPATIENT
Start: 2017-12-13 | End: 2017-12-13

## 2017-12-13 RX ORDER — DIPHENHYDRAMINE HCL 50 MG
25 CAPSULE ORAL ONCE
Qty: 0 | Refills: 0 | Status: COMPLETED | OUTPATIENT
Start: 2017-12-13 | End: 2017-12-13

## 2017-12-13 RX ORDER — ENOXAPARIN SODIUM 100 MG/ML
40 INJECTION SUBCUTANEOUS DAILY
Qty: 0 | Refills: 0 | Status: DISCONTINUED | OUTPATIENT
Start: 2017-12-13 | End: 2017-12-17

## 2017-12-13 RX ORDER — FOLIC ACID 0.8 MG
4 TABLET ORAL DAILY
Qty: 0 | Refills: 0 | Status: DISCONTINUED | OUTPATIENT
Start: 2017-12-13 | End: 2017-12-17

## 2017-12-13 RX ORDER — FOLIC ACID 0.8 MG
4 TABLET ORAL DAILY
Qty: 0 | Refills: 0 | Status: DISCONTINUED | OUTPATIENT
Start: 2017-12-13 | End: 2017-12-13

## 2017-12-13 RX ORDER — VANCOMYCIN HCL 1 G
1000 VIAL (EA) INTRAVENOUS ONCE
Qty: 0 | Refills: 0 | Status: DISCONTINUED | OUTPATIENT
Start: 2017-12-13 | End: 2017-12-13

## 2017-12-13 RX ORDER — HYDROMORPHONE HYDROCHLORIDE 2 MG/ML
1 INJECTION INTRAMUSCULAR; INTRAVENOUS; SUBCUTANEOUS ONCE
Qty: 0 | Refills: 0 | Status: DISCONTINUED | OUTPATIENT
Start: 2017-12-13 | End: 2017-12-13

## 2017-12-13 RX ORDER — SODIUM CHLORIDE 9 MG/ML
1000 INJECTION INTRAMUSCULAR; INTRAVENOUS; SUBCUTANEOUS ONCE
Qty: 0 | Refills: 0 | Status: COMPLETED | OUTPATIENT
Start: 2017-12-13 | End: 2017-12-13

## 2017-12-13 RX ORDER — SODIUM CHLORIDE 9 MG/ML
3 INJECTION INTRAMUSCULAR; INTRAVENOUS; SUBCUTANEOUS ONCE
Qty: 0 | Refills: 0 | Status: COMPLETED | OUTPATIENT
Start: 2017-12-13 | End: 2017-12-13

## 2017-12-13 RX ORDER — ALBUTEROL 90 UG/1
2 AEROSOL, METERED ORAL EVERY 6 HOURS
Qty: 0 | Refills: 0 | Status: DISCONTINUED | OUTPATIENT
Start: 2017-12-13 | End: 2017-12-14

## 2017-12-13 RX ORDER — SIMVASTATIN 20 MG/1
20 TABLET, FILM COATED ORAL AT BEDTIME
Qty: 0 | Refills: 0 | Status: DISCONTINUED | OUTPATIENT
Start: 2017-12-13 | End: 2017-12-17

## 2017-12-13 RX ORDER — ASPIRIN/CALCIUM CARB/MAGNESIUM 324 MG
81 TABLET ORAL DAILY
Qty: 0 | Refills: 0 | Status: DISCONTINUED | OUTPATIENT
Start: 2017-12-13 | End: 2017-12-17

## 2017-12-13 RX ORDER — LISINOPRIL 2.5 MG/1
20 TABLET ORAL DAILY
Qty: 0 | Refills: 0 | Status: DISCONTINUED | OUTPATIENT
Start: 2017-12-13 | End: 2017-12-17

## 2017-12-13 RX ORDER — DIAZEPAM 5 MG
10 TABLET ORAL ONCE
Qty: 0 | Refills: 0 | Status: DISCONTINUED | OUTPATIENT
Start: 2017-12-13 | End: 2017-12-13

## 2017-12-13 RX ORDER — MOMETASONE FUROATE 220 UG/1
1 INHALANT RESPIRATORY (INHALATION) DAILY
Qty: 0 | Refills: 0 | Status: DISCONTINUED | OUTPATIENT
Start: 2017-12-13 | End: 2017-12-17

## 2017-12-13 RX ORDER — KETOROLAC TROMETHAMINE 30 MG/ML
60 SYRINGE (ML) INJECTION ONCE
Qty: 0 | Refills: 0 | Status: DISCONTINUED | OUTPATIENT
Start: 2017-12-13 | End: 2017-12-13

## 2017-12-13 RX ORDER — FUROSEMIDE 40 MG
40 TABLET ORAL EVERY OTHER DAY
Qty: 0 | Refills: 0 | Status: DISCONTINUED | OUTPATIENT
Start: 2017-12-13 | End: 2017-12-17

## 2017-12-13 RX ORDER — CALCIUM CARBONATE 500(1250)
1 TABLET ORAL DAILY
Qty: 0 | Refills: 0 | Status: DISCONTINUED | OUTPATIENT
Start: 2017-12-13 | End: 2017-12-17

## 2017-12-13 RX ORDER — PANTOPRAZOLE SODIUM 20 MG/1
40 TABLET, DELAYED RELEASE ORAL
Qty: 0 | Refills: 0 | Status: DISCONTINUED | OUTPATIENT
Start: 2017-12-13 | End: 2017-12-17

## 2017-12-13 RX ORDER — INSULIN DEGLUDEC 100 U/ML
112 INJECTION, SOLUTION SUBCUTANEOUS
Qty: 0 | Refills: 0 | COMMUNITY

## 2017-12-13 RX ORDER — CARVEDILOL PHOSPHATE 80 MG/1
25 CAPSULE, EXTENDED RELEASE ORAL EVERY 12 HOURS
Qty: 0 | Refills: 0 | Status: DISCONTINUED | OUTPATIENT
Start: 2017-12-13 | End: 2017-12-17

## 2017-12-13 RX ORDER — LORATADINE 10 MG/1
10 TABLET ORAL DAILY
Qty: 0 | Refills: 0 | Status: DISCONTINUED | OUTPATIENT
Start: 2017-12-13 | End: 2017-12-17

## 2017-12-13 RX ORDER — VANCOMYCIN HCL 1 G
2000 VIAL (EA) INTRAVENOUS ONCE
Qty: 0 | Refills: 0 | Status: COMPLETED | OUTPATIENT
Start: 2017-12-13 | End: 2017-12-13

## 2017-12-13 RX ADMIN — HYDROMORPHONE HYDROCHLORIDE 1 MILLIGRAM(S): 2 INJECTION INTRAMUSCULAR; INTRAVENOUS; SUBCUTANEOUS at 13:16

## 2017-12-13 RX ADMIN — ALBUTEROL 2.5 MILLIGRAM(S): 90 AEROSOL, METERED ORAL at 15:47

## 2017-12-13 RX ADMIN — Medication 3 MILLILITER(S): at 01:39

## 2017-12-13 RX ADMIN — HYDROMORPHONE HYDROCHLORIDE 1 MILLIGRAM(S): 2 INJECTION INTRAMUSCULAR; INTRAVENOUS; SUBCUTANEOUS at 00:36

## 2017-12-13 RX ADMIN — CEFTRIAXONE 100 GRAM(S): 500 INJECTION, POWDER, FOR SOLUTION INTRAMUSCULAR; INTRAVENOUS at 12:47

## 2017-12-13 RX ADMIN — SODIUM CHLORIDE 3 MILLILITER(S): 9 INJECTION INTRAMUSCULAR; INTRAVENOUS; SUBCUTANEOUS at 12:47

## 2017-12-13 RX ADMIN — Medication 3 MILLILITER(S): at 13:15

## 2017-12-13 RX ADMIN — Medication 250 MILLIGRAM(S): at 13:16

## 2017-12-13 RX ADMIN — HYDROMORPHONE HYDROCHLORIDE 0.5 MILLIGRAM(S): 2 INJECTION INTRAMUSCULAR; INTRAVENOUS; SUBCUTANEOUS at 20:41

## 2017-12-13 RX ADMIN — SODIUM CHLORIDE 1000 MILLILITER(S): 9 INJECTION INTRAMUSCULAR; INTRAVENOUS; SUBCUTANEOUS at 12:46

## 2017-12-13 RX ADMIN — Medication 3 MILLILITER(S): at 19:47

## 2017-12-13 RX ADMIN — Medication 125 MILLIGRAM(S): at 12:46

## 2017-12-13 RX ADMIN — HYDROMORPHONE HYDROCHLORIDE 0.5 MILLIGRAM(S): 2 INJECTION INTRAMUSCULAR; INTRAVENOUS; SUBCUTANEOUS at 19:47

## 2017-12-13 RX ADMIN — Medication 3 MILLILITER(S): at 13:02

## 2017-12-13 RX ADMIN — Medication 10 MILLIGRAM(S): at 00:36

## 2017-12-13 RX ADMIN — Medication 60 MILLIGRAM(S): at 00:36

## 2017-12-13 RX ADMIN — Medication 25 MILLIGRAM(S): at 14:45

## 2017-12-13 RX ADMIN — HYDROMORPHONE HYDROCHLORIDE 1 MILLIGRAM(S): 2 INJECTION INTRAMUSCULAR; INTRAVENOUS; SUBCUTANEOUS at 12:46

## 2017-12-13 RX ADMIN — Medication 3 MILLILITER(S): at 12:47

## 2017-12-13 NOTE — ED ADULT TRIAGE NOTE - CHIEF COMPLAINT QUOTE
pt was recently d/c for neck pain, pt stating increased neck pain, fever at home, SOB, throat pain pt was recently d/c for neck pain, pt stating that when she woke up this am increased neck pain, fever at home, SOB, throat pain

## 2017-12-13 NOTE — CHART NOTE - NSCHARTNOTEFT_GEN_A_CORE
consult dictated    Impression:    Asthma with exacerbation   Viral Syndrome (rhinovirus)  Diabetes  Anxiety Disorder    Plan:    IV Solumedrol  Oxygen  Bronchodilators

## 2017-12-13 NOTE — ED PROVIDER NOTE - PROGRESS NOTE DETAILS
Pt with persistent dyspnea, wheezing with ?? min stridor. Will admit for further eval.  Dw Dr Gamez ([facility]) - will see pt to admit.

## 2017-12-13 NOTE — H&P ADULT - PROBLEM SELECTOR PLAN 3
pain control with -Likely contributing to neck pain as imaging of neck reveals no inflammation or swelling: abscess unlikely  -Continue pain management with Tylenol for mild, morphine for moderate and if severe- dilaudid. Will monitor for changes in respiratory status as these medications can cause respiratory depression.

## 2017-12-13 NOTE — ED PROVIDER NOTE - CONSTITUTIONAL, MLM
normal... Well appearing, well nourished, awake, alert, oriented to person, place, time/situation. patient crying, appears uncomfortable, morbid obesity

## 2017-12-13 NOTE — ED ADULT NURSE REASSESSMENT NOTE - NS ED NURSE REASSESS COMMENT FT1
1500-Pt to Ird from CT for lumbar puncture. Pt assessed and appears to have an audible wheeze noted. Pt requesting respiratory treatment. /78 HR96-97 and O2sat is 89-91%.  Er called and order obtained for resp. treatment by Dr. Martin. Dr. Delatorre spoke with Pt. and explained procedure to pt and consent was obtained after questions were answered and pt. had full understanding of procedure. Made comfortable.   1530-Respiratory treatment given by respiratory with good results. O2sat now 93% and pt. states she feels better. Audible wheeze gone at present.  1540- Pt to IRD exam table for procedure.  1545- Procedure started. /70/ HR93 O2sat 96% with @2 liters of o2 on for procedure.  1555- Pt tolerating procedure well. 6cc of spinal fluid (clear) obtained for specimens and sent to lab for evaluation.  1600-Procedure done. Bandaid dressing in place to lower back and remains dry and intact.Report to Chioma RN in ER.  1610- Pt back to ER in satisfactory condition.

## 2017-12-13 NOTE — H&P ADULT - PROBLEM SELECTOR PLAN 2
-supportive care  -monitor o2 sat  -duoneb -supportive care  -monitor o2 sat  -Continue duoneb treatments -positive for RSV  -supportive care  -monitor o2 sat  -Continue duoneb treatments

## 2017-12-13 NOTE — ED PROVIDER NOTE - PHYSICAL EXAMINATION
Neck ROM limited but patient able to move minimally in every direction, no midline c-spine tenderness, deformity, or step-off.

## 2017-12-13 NOTE — ED PROVIDER NOTE - PROGRESS NOTE DETAILS
Patient states feeling much better. Informed of results of CT, copies given. Advised to f/u with PMD. Neuro exam continues to be WNL.  Daughter to  patient.  Patient has Percocet and valium at home already

## 2017-12-13 NOTE — H&P ADULT - PROBLEM SELECTOR PLAN 10
IMPROVE VTE Individual Risk Assessment          RISK                                                          Points  [  ] Previous VTE                                                3  [  ] Thrombophilia                                             2  [  ] Lower limb paralysis                                   2        (unable to hold up >15 seconds)    [  ] Current Cancer                                             2         (within 6 months)  [  ] Immobilization > 24 hrs                              1  [  ] ICU/CCU stay > 24 hours                             1  [  ] Age > 60                                                         1    IMPROVE VTE Score: 2, DVT prophylaxis with Lovenox

## 2017-12-13 NOTE — H&P ADULT - ASSESSMENT
Patient is a 47 year old female with PMH of asthma, right heart failure, diabetes, Essential HTN, MTHFR deficiency and homocysteinuria, Prinzmetal angina presents complaining of neck pain that has been constant for the past week, admitted with Patient is a 47 year old female with PMH of asthma, right heart failure, diabetes, Essential HTN, MTHFR deficiency and homocysteinuria, Prinzmetal angina presents complaining of neck pain that has been constant for the past week, admitted with asthma exacerbation

## 2017-12-13 NOTE — ED ADULT NURSE NOTE - CHIEF COMPLAINT QUOTE
pt was recently d/c for neck pain, pt stating that when she woke up this am increased neck pain, fever at home, SOB, throat pain

## 2017-12-13 NOTE — ED ADULT NURSE NOTE - OBJECTIVE STATEMENT
received pt in bed #10b Pt was seen here @ 3am for neck pain & pt states she now has a fever & has throat pain Pt seen by Dr Martin Will monitor received pt in bed #10b Pt was seen @ [facility] yesterday then here @ 3am for neck pain & pt states she now has a fever & has throat pain Pt seen by Dr Martin Will monitor

## 2017-12-13 NOTE — ED PROVIDER NOTE - MEDICAL DECISION MAKING DETAILS
fever w neck pain, r/o meningitis, labs, asthma treatment, sepsis work up, ct soft tissue neck r/o retropharyngeal abscess

## 2017-12-13 NOTE — ED PROVIDER NOTE - OBJECTIVE STATEMENT
47 year old female with a history of IDDM, HTN, asthma presents with neck pain x 4 days.  States she woke up with right-sided neck pain that was initially dull and has significantly worsened in the last 24 hours.  No trauma or heavy lifting. She was taking Tylenol and Robaxin in the first few days with minimal relief.  Denies headache, back pain, weakness in arms/legs, fevers, gait disturbance. Yesterday, the pain worsened and she took 1000mg Motrin, 1000mg Tylenol, 15mg Valium and 1500mg Robaxin without relief.  Went to [facility] ER and was given IM Toradol and percocet.  States she had moderate relief.  Took 2 tabs of Percocet at 10:30pm last night and feels like the pain is now worse.  Feels it radiating to left side of neck and now reports neck pain when swallowing.  PMD Anna Tuttle 47 year old female with a history of IDDM, HTN, asthma presents with neck pain x 4 days.  States she woke up with right-sided neck pain that was initially dull and has significantly worsened in the last 24 hours.  No trauma or heavy lifting. She was taking Tylenol and Robaxin in the first few days with minimal relief.  Applied heating pad and ice as well. Denies headache, back pain, weakness in arms/legs, fevers, gait disturbance. Yesterday, the pain worsened and she took 1000mg Motrin, 1000mg Tylenol, 15mg Valium and 1500mg Robaxin without relief.  Went to [facility] ER and was given IM Toradol and percocet.  States she had moderate relief.  Took 2 tabs of Percocet at 10:30pm last night and feels like the pain is now worse.  Feels it radiating to left side of neck and now reports neck pain when swallowing.  PMD Anna Tuttle

## 2017-12-13 NOTE — ED PROVIDER NOTE - OBJECTIVE STATEMENT
pt c/o R sided neck pain and sore throat for 4 days, seen at [facility] ER and this ER in the early morning around 2am, had a CT c-spine to evaluate neck which was negative.  pt now w worsening sore throat and neck pain and spiked a fever of 104F.  pt also now wheezing and has h/o asthma.

## 2017-12-13 NOTE — H&P ADULT - PROBLEM SELECTOR PLAN 1
-Unasyn 3g q8 or clinda 600 q8 -Likely resulting from URI vs early pneumonia  -Consult ICU bed: patient's lungs and breathing is not improving after treatment  -Continue with solumedrol 40 q8  -Continue duonebs, albuterol PRN  -Monitor O2 saturation, oxygen delivery as needed  -Continue ceftriaxone pneumonia  -ambulate as tolerated  -DASH/ TLC diet  -vitals per routine  -Pulmonology consulted: Dr. Lassiter, recommends ICU placement, ICU evaluation pending

## 2017-12-13 NOTE — ED PROVIDER NOTE - PHYSICAL EXAMINATION
Gen:  alert, awake, audible wheezing, morbidly obese  HEENT:  atraumatic head, airway clear, pupils equal and round, erythema to post OP, no exudates.  CV:  rrr, nl S1, S2, no m/r/g  Pulm:  wheezing through all lung fields  Abd: s/nt/nd, +BS  Ext:  moving all extremities  Neuro:  grossly intact, no deficits  Skin:  clear, dry, intact

## 2017-12-13 NOTE — H&P ADULT - HISTORY OF PRESENT ILLNESS
Patient is a 47 year old female with PMH of asthma, right heart failure, diabetes, Essential HTN, MTHFR deficiency and homocysteinuria, Prinzmetal angina presents complaining of neck pain that has been constant for the past week. Patient went to [place], diagnosed with torticollis, and this ER yesterday for complaint but was sent home after imaging revealed no findings. Patient says after discharge yesterday , the pain which was initially localized to the right side but now involving the entire neck, got worse. She describes the pain as a 10/10 achy and sharp pain, nonradiating worsening pain. The pain is made worse with swallowing and coughing. She tried taking percocet, valium, hot and cold compresses for the  pain but nothing provides relief. Yesterday, she also started to experience some respiratory symptoms: nasal congestion, productive cough though which she is producing white mucus, fever of 100.6, chills, and dyspnea on exertion, and a headache. She's also felt nauseous but has not vomit. She denies chest pain, dizziness, confusion, blurry vision, abdominal pain, diarrhea, hematochezia, hematuria, dysuria.    In ED, vitals Patient is a 47 year old female with PMH of asthma, right heart failure, diabetes, Essential HTN, MTHFR deficiency and homocysteinuria, Prinzmetal angina presents complaining of neck pain that has been constant for the past week. Patient went to [place], diagnosed with torticollis, and this ER yesterday for complaint but was sent home after imaging revealed no findings. Patient says after discharge yesterday , the pain which was initially localized to the right side but now involving the entire neck, got worse. She describes the pain as a 10/10 achy and sharp pain, nonradiating worsening pain. The pain is made worse with swallowing and coughing. She tried taking percocet, valium, hot and cold compresses for the  pain but nothing provides relief. Yesterday, she also started to experience some respiratory symptoms: nasal congestion, productive cough though which she is producing white mucus, fever of 100.6, chills, and dyspnea on exertion, and a headache. She's also felt nauseous but has not vomit.  She denies chest pain, dizziness, confusion, blurry vision, abdominal pain, diarrhea, hematochezia, hematuria, dysuria.    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     In ED, patient was afebrile but tachycardic. Blood pressure was elevated at 155/98. Remainder of vitals were within normal range. CBC revealed mild leukocytosis, BMP was unremarkable. Lumbar puncture showed a CSF glucose level of 85 and protein of 29. RVP positive for Rhinovirus. Patient is a 47 year old female with PMH of asthma, right heart failure, diabetes, Essential HTN, MTHFR deficiency and homocysteinuria, Prinzmetal angina presents complaining of neck pain that has been constant for the past week. Patient went to [place], diagnosed with torticollis, and this ER yesterday for complaint but was sent home after imaging revealed no findings. Patient says after discharge yesterday , the pain which was initially localized to the right side but now involving the entire neck, got worse. She describes the pain as a 10/10 achy and sharp pain, nonradiating worsening pain. The pain is made worse with swallowing and coughing. She tried taking percocet, valium, hot and cold compresses for the  pain but nothing provides relief. Yesterday, she also started to experience some respiratory symptoms: nasal congestion, productive cough though which she is producing white mucus, fever of 100.6, chills, and dyspnea on exertion, and a headache. She's also felt nauseous but has not vomit.  She denies chest pain, dizziness, confusion, blurry vision, abdominal pain, diarrhea, hematochezia, hematuria, dysuria.    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     In ED, patient was afebrile but tachycardic. Blood pressure was elevated at 155/98. Remainder of vitals were within normal range. CBC revealed mild leukocytosis, BMP was unremarkable. Lumbar puncture showed a CSF glucose level of 85 and protein of 29. RVP positive for Rhinovirus. CXR showed  ill-defined patchy haziness right lower lobe. Present early pneumonia, follow-up to resolution is suggested.  There are no pleural effusion. Tortuous thoracic aorta, heart normal size.    There are no acute osseous abnormality. CT of neck showed findings compatible with prominent calcific tendinitis of the longus colli muscle with prevertebral soft tissue fullness; no localized fluid collection/abscess demonstrated. Patient given albuterol and duoneb treatment, dilaudid for pain, dose of vancomycin and NS. Patient is a 47 year old female with PMH of asthma, right heart failure, diabetes, Essential HTN, MTHFR deficiency and homocysteinuria, Prinzmetal angina presents complaining of neck pain that has been constant for the past week. Patient went to [place], diagnosed with torticollis, and this ER yesterday for complaint but was sent home after imaging revealed no findings. Patient says after discharge yesterday , the pain which was initially localized to the right side but now involving the entire neck, got worse. She describes the pain as a 10/10 achy and sharp pain, nonradiating worsening pain. The pain is made worse with swallowing and coughing. She tried taking percocet, valium, hot and cold compresses for the  pain but nothing provides relief. Yesterday, she also started to experience some respiratory symptoms: nasal congestion, productive cough though which she is producing white mucus, fever of 100.6, chills, and dyspnea on exertion, and a headache. She's also felt nauseous but has not vomit.  She denies chest pain, dizziness, confusion, blurry vision, abdominal pain, diarrhea, hematochezia, hematuria, dysuria.    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 Patient has had reurrent intubation    In ED, patient was afebrile but tachycardic. Blood pressure was elevated at 155/98. Remainder of vitals were within normal range. CBC revealed mild leukocytosis, BMP was unremarkable. Lumbar puncture showed a CSF glucose level of 85 and protein of 29. RVP positive for Rhinovirus. CXR showed  ill-defined patchy haziness right lower lobe. Present early pneumonia, follow-up to resolution is suggested.  There are no pleural effusion. Tortuous thoracic aorta, heart normal size.    There are no acute osseous abnormality. CT of neck showed findings compatible with prominent calcific tendinitis of the longus colli muscle with prevertebral soft tissue fullness; no localized fluid collection/abscess demonstrated. Patient given albuterol and duoneb treatment, dilaudid for pain, dose of vancomycin and NS. Patient is a 47 year old female with PMH of asthma, right heart failure, diabetes, Essential HTN, MTHFR deficiency and homocysteinuria, Prinzmetal angina presents complaining of neck pain that has been constant for the past week. Patient went to [place], diagnosed with torticollis, and this ER yesterday for complaint but was sent home after imaging revealed no findings. Patient says after discharge yesterday , the pain which was initially localized to the right side but now involving the entire neck, got worse. She describes the pain as a 10/10 achy and sharp pain, nonradiating worsening pain. The pain is made worse with swallowing and coughing. She tried taking percocet, valium, hot and cold compresses for the  pain but nothing provides relief. Yesterday, she also started to experience some respiratory symptoms: nasal congestion, productive cough though which she is producing white mucus, fever of 100.6, chills, and dyspnea on exertion, and a headache. She's also felt nauseous but has not vomit.  She denies chest pain, dizziness, confusion, blurry vision, abdominal pain, diarrhea, hematochezia, hematuria, dysuria.    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 Patient has had 21 intubations in the past.     In ED, patient was afebrile but tachycardic. Blood pressure was elevated at 155/98. Remainder of vitals were within normal range. CBC revealed mild leukocytosis, BMP was unremarkable. Lumbar puncture showed a CSF glucose level of 85 and protein of 29. RVP positive for Rhinovirus. CXR showed  ill-defined patchy haziness right lower lobe. Present early pneumonia, follow-up to resolution is suggested.  There are no pleural effusion. Tortuous thoracic aorta, heart normal size.    There are no acute osseous abnormality. CT of neck showed findings compatible with prominent calcific tendinitis of the longus colli muscle with prevertebral soft tissue fullness; no localized fluid collection/abscess demonstrated. Patient given albuterol and duoneb treatment, dilaudid for pain, dose of vancomycin and NS.

## 2017-12-13 NOTE — H&P ADULT - ATTENDING COMMENTS
ICU managing patient overnight.  hx of 21 intubations in the past.  Pulmonary recs appreciated, they recommended ICU watch overnight.   In regards to the neck pain and CT findings, continue abx.  Consider ID if symptoms aren't resolving or getting worse.

## 2017-12-14 ENCOUNTER — TRANSCRIPTION ENCOUNTER (OUTPATIENT)
Age: 47
End: 2017-12-14

## 2017-12-14 LAB
CRYPTOC AG CSF-ACNC: NEGATIVE — SIGNIFICANT CHANGE UP
CULTURE RESULTS: SIGNIFICANT CHANGE UP
ERYTHROCYTE [SEDIMENTATION RATE] IN BLOOD: 28 MM/HR — HIGH (ref 0–15)
PROCALCITONIN SERPL-MCNC: <0.05 — SIGNIFICANT CHANGE UP (ref 0–0.04)
SPECIMEN SOURCE: SIGNIFICANT CHANGE UP

## 2017-12-14 PROCEDURE — 99233 SBSQ HOSP IP/OBS HIGH 50: CPT | Mod: GC

## 2017-12-14 PROCEDURE — 99233 SBSQ HOSP IP/OBS HIGH 50: CPT

## 2017-12-14 RX ORDER — HYDROMORPHONE HYDROCHLORIDE 2 MG/ML
0.5 INJECTION INTRAMUSCULAR; INTRAVENOUS; SUBCUTANEOUS ONCE
Qty: 0 | Refills: 0 | Status: DISCONTINUED | OUTPATIENT
Start: 2017-12-14 | End: 2017-12-14

## 2017-12-14 RX ORDER — INSULIN LISPRO 100/ML
VIAL (ML) SUBCUTANEOUS
Qty: 0 | Refills: 0 | Status: DISCONTINUED | OUTPATIENT
Start: 2017-12-14 | End: 2017-12-16

## 2017-12-14 RX ORDER — AZITHROMYCIN 500 MG/1
500 TABLET, FILM COATED ORAL ONCE
Qty: 0 | Refills: 0 | Status: COMPLETED | OUTPATIENT
Start: 2017-12-14 | End: 2017-12-14

## 2017-12-14 RX ORDER — DEXTROSE 50 % IN WATER 50 %
25 SYRINGE (ML) INTRAVENOUS ONCE
Qty: 0 | Refills: 0 | Status: DISCONTINUED | OUTPATIENT
Start: 2017-12-14 | End: 2017-12-17

## 2017-12-14 RX ORDER — AZITHROMYCIN 500 MG/1
500 TABLET, FILM COATED ORAL EVERY 24 HOURS
Qty: 0 | Refills: 0 | Status: DISCONTINUED | OUTPATIENT
Start: 2017-12-15 | End: 2017-12-17

## 2017-12-14 RX ORDER — DIPHENHYDRAMINE HCL 50 MG
25 CAPSULE ORAL ONCE
Qty: 0 | Refills: 0 | Status: COMPLETED | OUTPATIENT
Start: 2017-12-14 | End: 2017-12-14

## 2017-12-14 RX ORDER — IPRATROPIUM/ALBUTEROL SULFATE 18-103MCG
3 AEROSOL WITH ADAPTER (GRAM) INHALATION EVERY 6 HOURS
Qty: 0 | Refills: 0 | Status: DISCONTINUED | OUTPATIENT
Start: 2017-12-14 | End: 2017-12-17

## 2017-12-14 RX ORDER — INSULIN LISPRO 100/ML
6 VIAL (ML) SUBCUTANEOUS ONCE
Qty: 0 | Refills: 0 | Status: COMPLETED | OUTPATIENT
Start: 2017-12-14 | End: 2017-12-14

## 2017-12-14 RX ORDER — ALBUTEROL 90 UG/1
1 AEROSOL, METERED ORAL EVERY 4 HOURS
Qty: 0 | Refills: 0 | Status: DISCONTINUED | OUTPATIENT
Start: 2017-12-14 | End: 2017-12-17

## 2017-12-14 RX ORDER — GLUCAGON INJECTION, SOLUTION 0.5 MG/.1ML
1 INJECTION, SOLUTION SUBCUTANEOUS ONCE
Qty: 0 | Refills: 0 | Status: DISCONTINUED | OUTPATIENT
Start: 2017-12-14 | End: 2017-12-14

## 2017-12-14 RX ORDER — DILTIAZEM HCL 120 MG
300 CAPSULE, EXT RELEASE 24 HR ORAL DAILY
Qty: 0 | Refills: 0 | Status: DISCONTINUED | OUTPATIENT
Start: 2017-12-14 | End: 2017-12-17

## 2017-12-14 RX ORDER — SODIUM CHLORIDE 9 MG/ML
1000 INJECTION, SOLUTION INTRAVENOUS
Qty: 0 | Refills: 0 | Status: DISCONTINUED | OUTPATIENT
Start: 2017-12-14 | End: 2017-12-17

## 2017-12-14 RX ORDER — KETOROLAC TROMETHAMINE 30 MG/ML
30 SYRINGE (ML) INJECTION EVERY 8 HOURS
Qty: 0 | Refills: 0 | Status: DISCONTINUED | OUTPATIENT
Start: 2017-12-14 | End: 2017-12-17

## 2017-12-14 RX ORDER — DEXTROSE 50 % IN WATER 50 %
25 SYRINGE (ML) INTRAVENOUS ONCE
Qty: 0 | Refills: 0 | Status: DISCONTINUED | OUTPATIENT
Start: 2017-12-14 | End: 2017-12-14

## 2017-12-14 RX ORDER — TIOTROPIUM BROMIDE 18 UG/1
1 CAPSULE ORAL; RESPIRATORY (INHALATION) DAILY
Qty: 0 | Refills: 0 | Status: DISCONTINUED | OUTPATIENT
Start: 2017-12-14 | End: 2017-12-17

## 2017-12-14 RX ORDER — INSULIN LISPRO 100/ML
VIAL (ML) SUBCUTANEOUS
Qty: 0 | Refills: 0 | Status: DISCONTINUED | OUTPATIENT
Start: 2017-12-14 | End: 2017-12-14

## 2017-12-14 RX ORDER — INSULIN LISPRO 100/ML
7 VIAL (ML) SUBCUTANEOUS
Qty: 0 | Refills: 0 | Status: DISCONTINUED | OUTPATIENT
Start: 2017-12-14 | End: 2017-12-16

## 2017-12-14 RX ORDER — DEXTROSE 50 % IN WATER 50 %
12.5 SYRINGE (ML) INTRAVENOUS ONCE
Qty: 0 | Refills: 0 | Status: DISCONTINUED | OUTPATIENT
Start: 2017-12-14 | End: 2017-12-17

## 2017-12-14 RX ORDER — AZITHROMYCIN 500 MG/1
TABLET, FILM COATED ORAL
Qty: 0 | Refills: 0 | Status: DISCONTINUED | OUTPATIENT
Start: 2017-12-14 | End: 2017-12-17

## 2017-12-14 RX ORDER — DEXTROSE 50 % IN WATER 50 %
12.5 SYRINGE (ML) INTRAVENOUS ONCE
Qty: 0 | Refills: 0 | Status: DISCONTINUED | OUTPATIENT
Start: 2017-12-14 | End: 2017-12-14

## 2017-12-14 RX ORDER — INSULIN GLARGINE 100 [IU]/ML
30 INJECTION, SOLUTION SUBCUTANEOUS EVERY MORNING
Qty: 0 | Refills: 0 | Status: DISCONTINUED | OUTPATIENT
Start: 2017-12-15 | End: 2017-12-16

## 2017-12-14 RX ORDER — SODIUM CHLORIDE 9 MG/ML
1000 INJECTION, SOLUTION INTRAVENOUS
Qty: 0 | Refills: 0 | Status: DISCONTINUED | OUTPATIENT
Start: 2017-12-14 | End: 2017-12-14

## 2017-12-14 RX ORDER — DEXTROSE 50 % IN WATER 50 %
1 SYRINGE (ML) INTRAVENOUS ONCE
Qty: 0 | Refills: 0 | Status: DISCONTINUED | OUTPATIENT
Start: 2017-12-14 | End: 2017-12-14

## 2017-12-14 RX ORDER — INSULIN GLARGINE 100 [IU]/ML
20 INJECTION, SOLUTION SUBCUTANEOUS ONCE
Qty: 0 | Refills: 0 | Status: COMPLETED | OUTPATIENT
Start: 2017-12-14 | End: 2017-12-14

## 2017-12-14 RX ORDER — HYDROMORPHONE HYDROCHLORIDE 2 MG/ML
0.5 INJECTION INTRAMUSCULAR; INTRAVENOUS; SUBCUTANEOUS
Qty: 0 | Refills: 0 | Status: DISCONTINUED | OUTPATIENT
Start: 2017-12-14 | End: 2017-12-17

## 2017-12-14 RX ORDER — CEFTRIAXONE 500 MG/1
1 INJECTION, POWDER, FOR SOLUTION INTRAMUSCULAR; INTRAVENOUS EVERY 24 HOURS
Qty: 0 | Refills: 0 | Status: DISCONTINUED | OUTPATIENT
Start: 2017-12-14 | End: 2017-12-17

## 2017-12-14 RX ORDER — KETOROLAC TROMETHAMINE 30 MG/ML
30 SYRINGE (ML) INJECTION ONCE
Qty: 0 | Refills: 0 | Status: DISCONTINUED | OUTPATIENT
Start: 2017-12-14 | End: 2017-12-14

## 2017-12-14 RX ORDER — GLUCAGON INJECTION, SOLUTION 0.5 MG/.1ML
1 INJECTION, SOLUTION SUBCUTANEOUS ONCE
Qty: 0 | Refills: 0 | Status: DISCONTINUED | OUTPATIENT
Start: 2017-12-14 | End: 2017-12-17

## 2017-12-14 RX ORDER — DEXTROSE 50 % IN WATER 50 %
1 SYRINGE (ML) INTRAVENOUS ONCE
Qty: 0 | Refills: 0 | Status: DISCONTINUED | OUTPATIENT
Start: 2017-12-14 | End: 2017-12-17

## 2017-12-14 RX ADMIN — HYDROMORPHONE HYDROCHLORIDE 0.5 MILLIGRAM(S): 2 INJECTION INTRAMUSCULAR; INTRAVENOUS; SUBCUTANEOUS at 10:32

## 2017-12-14 RX ADMIN — HYDROMORPHONE HYDROCHLORIDE 0.5 MILLIGRAM(S): 2 INJECTION INTRAMUSCULAR; INTRAVENOUS; SUBCUTANEOUS at 02:39

## 2017-12-14 RX ADMIN — CARVEDILOL PHOSPHATE 25 MILLIGRAM(S): 80 CAPSULE, EXTENDED RELEASE ORAL at 06:37

## 2017-12-14 RX ADMIN — Medication 8: at 16:45

## 2017-12-14 RX ADMIN — Medication 60 MILLIGRAM(S): at 06:35

## 2017-12-14 RX ADMIN — Medication 30 MILLIGRAM(S): at 20:27

## 2017-12-14 RX ADMIN — Medication 30 MILLIGRAM(S): at 20:40

## 2017-12-14 RX ADMIN — Medication 8: at 11:35

## 2017-12-14 RX ADMIN — Medication 30 MILLIGRAM(S): at 06:34

## 2017-12-14 RX ADMIN — Medication 7 UNIT(S): at 16:45

## 2017-12-14 RX ADMIN — Medication 8: at 08:26

## 2017-12-14 RX ADMIN — INSULIN GLARGINE 10 UNIT(S): 100 INJECTION, SOLUTION SUBCUTANEOUS at 08:27

## 2017-12-14 RX ADMIN — PANTOPRAZOLE SODIUM 40 MILLIGRAM(S): 20 TABLET, DELAYED RELEASE ORAL at 06:38

## 2017-12-14 RX ADMIN — CEFTRIAXONE 100 GRAM(S): 500 INJECTION, POWDER, FOR SOLUTION INTRAMUSCULAR; INTRAVENOUS at 12:50

## 2017-12-14 RX ADMIN — MOMETASONE FUROATE 1 PUFF(S): 220 INHALANT RESPIRATORY (INHALATION) at 06:38

## 2017-12-14 RX ADMIN — Medication 25 MILLIGRAM(S): at 14:18

## 2017-12-14 RX ADMIN — HYDROMORPHONE HYDROCHLORIDE 0.5 MILLIGRAM(S): 2 INJECTION INTRAMUSCULAR; INTRAVENOUS; SUBCUTANEOUS at 19:57

## 2017-12-14 RX ADMIN — Medication 6 UNIT(S): at 03:17

## 2017-12-14 RX ADMIN — Medication 3 MILLILITER(S): at 08:22

## 2017-12-14 RX ADMIN — CARVEDILOL PHOSPHATE 25 MILLIGRAM(S): 80 CAPSULE, EXTENDED RELEASE ORAL at 18:27

## 2017-12-14 RX ADMIN — Medication 10: at 21:34

## 2017-12-14 RX ADMIN — INSULIN GLARGINE 20 UNIT(S): 100 INJECTION, SOLUTION SUBCUTANEOUS at 10:34

## 2017-12-14 RX ADMIN — Medication 30 MILLIGRAM(S): at 06:52

## 2017-12-14 RX ADMIN — HYDROMORPHONE HYDROCHLORIDE 0.5 MILLIGRAM(S): 2 INJECTION INTRAMUSCULAR; INTRAVENOUS; SUBCUTANEOUS at 16:38

## 2017-12-14 RX ADMIN — MONTELUKAST 10 MILLIGRAM(S): 4 TABLET, CHEWABLE ORAL at 11:21

## 2017-12-14 RX ADMIN — SIMVASTATIN 20 MILLIGRAM(S): 20 TABLET, FILM COATED ORAL at 21:34

## 2017-12-14 RX ADMIN — AZITHROMYCIN 255 MILLIGRAM(S): 500 TABLET, FILM COATED ORAL at 10:33

## 2017-12-14 RX ADMIN — LISINOPRIL 20 MILLIGRAM(S): 2.5 TABLET ORAL at 06:37

## 2017-12-14 RX ADMIN — Medication 3 MILLILITER(S): at 19:53

## 2017-12-14 RX ADMIN — Medication 3 MILLILITER(S): at 14:17

## 2017-12-14 RX ADMIN — Medication 81 MILLIGRAM(S): at 11:17

## 2017-12-14 RX ADMIN — Medication 300 MILLIGRAM(S): at 06:37

## 2017-12-14 RX ADMIN — HYDROMORPHONE HYDROCHLORIDE 0.5 MILLIGRAM(S): 2 INJECTION INTRAMUSCULAR; INTRAVENOUS; SUBCUTANEOUS at 23:46

## 2017-12-14 RX ADMIN — Medication 1 TABLET(S): at 11:17

## 2017-12-14 RX ADMIN — HYDROMORPHONE HYDROCHLORIDE 0.5 MILLIGRAM(S): 2 INJECTION INTRAMUSCULAR; INTRAVENOUS; SUBCUTANEOUS at 02:24

## 2017-12-14 RX ADMIN — LORATADINE 10 MILLIGRAM(S): 10 TABLET ORAL at 11:17

## 2017-12-14 RX ADMIN — HYDROMORPHONE HYDROCHLORIDE 0.5 MILLIGRAM(S): 2 INJECTION INTRAMUSCULAR; INTRAVENOUS; SUBCUTANEOUS at 23:15

## 2017-12-14 RX ADMIN — Medication 60 MILLIGRAM(S): at 18:27

## 2017-12-14 RX ADMIN — ALBUTEROL 2 PUFF(S): 90 AEROSOL, METERED ORAL at 02:07

## 2017-12-14 RX ADMIN — Medication 4 MILLIGRAM(S): at 11:20

## 2017-12-14 RX ADMIN — ENOXAPARIN SODIUM 40 MILLIGRAM(S): 100 INJECTION SUBCUTANEOUS at 11:16

## 2017-12-14 NOTE — DISCHARGE NOTE ADULT - CARE PROVIDERS DIRECT ADDRESSES
,DirectAddress_Unknown,[email or web address],DirectAddress_Unknown

## 2017-12-14 NOTE — DIETITIAN INITIAL EVALUATION ADULT. - OTHER INFO
Pt A+O x 4. Fever on admission. Dx Asthma exacerbation, Upper Respiratory Infection. Pt tolerating Dash/TLC diet well; good po intake. Pt is a PA & diabetes educator. Long hx uncontrolled type II DM. Pta on victoza & 110units lantus qhs. Refused diabetic diet stating she has an allergy to artifical sweeteners (nutrasweet). States she carb counts and doesn't overconsume during meals. Refused education.

## 2017-12-14 NOTE — DISCHARGE NOTE ADULT - MEDICATION SUMMARY - MEDICATIONS TO TAKE
I will START or STAY ON the medications listed below when I get home from the hospital:    predniSONE 20 mg oral tablet  -- 3 tab(s) by mouth every 12 hours   -- Indication: For Asthma exacerbation, non-allergic, unspecified asthma severity    Dilaudid 2 mg oral tablet  -- 1 tab(s) by mouth every 6 hours, As Needed -for severe pain MDD:4 tabs  -- Caution federal law prohibits the transfer of this drug to any person other  than the person for whom it was prescribed.  May cause drowsiness.  Alcohol may intensify this effect.  Use care when operating dangerous machinery.  This prescription cannot be refilled.  Using more of this medication than prescribed may cause serious breathing problems.    -- Indication: For Torticollis    aspirin 81 mg oral tablet  -- 1 tab(s) by mouth once a day  -- Indication: For Prinzmetal Angina    quinapril 20 mg oral tablet  -- 1 tab(s) by mouth once a day  -- Indication: For HTN (hypertension)    calcium carbonate  -- 1200 milligram(s) by mouth once a day  -- Indication: For Prophylactic measure    Cardizem  mg/24 hours oral capsule, extended release  -- 1 cap(s) by mouth once a day  -- Indication: For HTN (hypertension)    Victoza  -- 1.8 milligram(s) subcutaneous once a day  -- Indication: For Diabetes mellitus type 2 in obese    insulin glargine  -- 30 unit(s) subcutaneous 2 times a day (before meals)  -- Indication: For Diabetes mellitus type 2 in obese    Benadryl 25 mg oral capsule  -- 1 cap(s) by mouth 3 times a day, As Needed for itching  -- Indication: For Prophylactic measure    loratadine 10 mg oral tablet  -- 1 tab(s) by mouth once a day  -- Indication: For Asthma exacerbation, non-allergic, unspecified asthma severity    simvastatin 20 mg oral tablet  -- 1 tab(s) by mouth once a day (at bedtime)  -- Indication: For HLD (hyperlipidemia)    oseltamivir 75 mg oral capsule  -- 1 cap(s) by mouth once a day  -- Indication: For URI (upper respiratory infection)    carvedilol 25 mg oral tablet  -- 1 tab(s) by mouth 2 times a day  -- Indication: For Prinzmetal Angina    Stiolto Respimat 2.5 mcg-2.5 mcg inhalation aerosol  -- 2 puff(s) inhaled every 24 hours  -- Indication: For Asthma exacerbation, non-allergic, unspecified asthma severity    albuterol 90 mcg/inh inhalation aerosol  -- 1 puff(s) inhaled every 4 hours  -- Indication: For Asthma exacerbation, non-allergic, unspecified asthma severity    DuoNeb 0.5 mg-2.5 mg/3 mL inhalation solution  -- 3 milliliter(s) inhaled 4 times a day  -- Indication: For Asthma exacerbation, non-allergic, unspecified asthma severity    cefuroxime 250 mg oral tablet  -- 1 tab(s) by mouth every 12 hours  -- Indication: For PNA    Lasix 40 mg oral tablet  -- 1 tab(s) by mouth 3 to 4 times a week, As Needed  -- Indication: For As needed for LE swelling/RHF    Zaroxolyn  -- 5 milligram(s) by mouth 3 to 4 times a week, As Needed  -- Indication: For As needed for LE swelling/ RHF    Singulair 10 mg oral tablet  -- 1 tab(s) by mouth once a day  -- Indication: For Asthma exacerbation, non-allergic, unspecified asthma severity    azithromycin 250 mg oral tablet  -- 1 tab(s) by mouth once a day  -- Indication: For PNA    Omega-3 oral capsule  -- 1200 milligram(s) by mouth once a day  -- Indication: For Prophylactic measure    Protonix 40 mg oral delayed release tablet  -- 1 tab(s) by mouth once a day  -- Indication: For GERD (gastroesophageal reflux disease)    Asmanex Twisthaler 60 Dose 220 mcg/inh inhalation aerosol powder  -- 1 puff(s) inhaled once a day (in the evening)  -- Indication: For Asthma exacerbation, non-allergic, unspecified asthma severity    biotin  -- 5000 microgram(s) by mouth once a day  -- Indication: For Prophylactic measure    folic acid  -- 4 milligram(s) by mouth once a day  -- Indication: For Prophylactic measure    Vitamin D3 50,000 intl units oral capsule  -- 1 cap(s) by mouth once a week  -- Indication: For Prophylactic measure

## 2017-12-14 NOTE — DISCHARGE NOTE ADULT - PLAN OF CARE
stable Continue ceftin and azithromycin for 3 more days to complete 7 day course of antibiotics for PNA  Continue prednisone and follow up closely with pulmonologist for tapering  Continue nebulizers and home medications Increase humalog 12 units tid before meals  Continue lantus 30 units twice a day  goal -180  ADA diet Continue Tamiflu for Influenza prophylaxis for 2 more days Continue pain medication  Follow up with pain management doctor if pain does not improve Continue home medications Continue BP medications and follow up with cardiologist as schedule Continue follow up with primary care as scheduled

## 2017-12-14 NOTE — DIETITIAN INITIAL EVALUATION ADULT. - ETIOLOGY
related to excessive energy intake & inability to lose significant wt through conventional wt loss methods related to endocrine dysfunction & use of steroids

## 2017-12-14 NOTE — DISCHARGE NOTE ADULT - CARE PROVIDER_API CALL
Yon Lassiter), Internal Medicine; Pulmonary Disease  [address]  [address]  [place] [address]  Phone: [phone]  Fax: [phone]    Hina Christian), Internal Medicine  [address] [place] [address]  Phone: [phone]  Fax: [phone]    Ricardo Pulido (DO), Internal Medicine  [address] [place] [address]  Phone: [phone]  Fax: [phone]

## 2017-12-14 NOTE — DISCHARGE NOTE ADULT - HOSPITAL COURSE
47 year old female with PMH of asthma, right heart failure, diabetes, Essential HTN, MTHFR deficiency and homocysteinuria, Prinzmetal angina presented complaining of neck pain that had been constant for the past week prior to admission. Patient went to [facility], diagnosed with torticollis, and then [place] ER day before admission for same complaint but was sent home after imaging revealed no findings. Patient stated that after discharge that day, the pain which was initially localized to the right side but proceeded to involve the entire neck, got worse. She described the pain as a 10/10 achy and sharp pain, non radiating worsening pain. The pain made worse with swallowing and coughing. She tried taking percocet, valium, hot and cold compresses for the  pain but nothing provides relief. She also started to experience some respiratory symptoms: nasal congestion, productive cough though which she is producing white mucus, fever of 100.6, chills, and dyspnea on exertion, and a headache. She had also felt nauseous but denied episodes of vomiting. Denied chest pain, dizziness, confusion, blurry vision, abdominal pain, diarrhea, hematochezia, hematuria, dysuria. 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 Patient has had 21 intubations in the past.     In ED, patient was afebrile but tachycardic. Lumbar puncture showed a CSF glucose level of 85 and protein of 29. RVP positive for Rhinovirus. CXR showed ill-defined patchy haziness right lower lobe; early pneumonia, no pleural effusion; no acute osseous abnormality. CT of neck showed findings compatible with prominent calcific tendinitis of the longus colli muscle with prevertebral soft tissue fullness; no localized fluid collection/abscess demonstrated. Patient given albuterol and duoneb treatment, dilaudid for pain, dose of vancomycin and NS.    Admitted to ICU per pulmonology recommendation for closer monitoring for acute asthma exacerbation, RSV viral syndrome. LP negative, rocephin dose decreased as meningitis ruled out and patient started on zithromax for likely pneumonia. Pain medication optimized for neck pain. Patient remained stable, afebrile, and steroids tapered appropriately to PO. Transferred to general medical floor. 47 year old female with PMH of asthma, right heart failure, diabetes, Essential HTN, MTHFR deficiency and homocysteinuria, Prinzmetal angina presented complaining of neck pain that had been constant for the past week prior to admission. Patient went to [facility], diagnosed with torticollis, and then [place] ER day before admission for same complaint but was sent home after imaging revealed no findings. Patient stated that after discharge that day, the pain which was initially localized to the right side but proceeded to involve the entire neck, got worse. She described the pain as a 10/10 achy and sharp pain, non radiating worsening pain. The pain made worse with swallowing and coughing. She tried taking percocet, valium, hot and cold compresses for the  pain but nothing provides relief. She also started to experience some respiratory symptoms: nasal congestion, productive cough though which she is producing white mucus, fever of 100.6, chills, and dyspnea on exertion, and a headache. She had also felt nauseous but denied episodes of vomiting. Denied chest pain, dizziness, confusion, blurry vision, abdominal pain, diarrhea, hematochezia, hematuria, dysuria. 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 Patient has had 21 intubations in the past.     In ED, patient was afebrile but tachycardic. Lumbar puncture showed a CSF glucose level of 85 and protein of 29. RVP positive for Rhinovirus. CXR showed ill-defined patchy haziness right lower lobe; early pneumonia, no pleural effusion; no acute osseous abnormality. CT of neck showed findings compatible with prominent calcific tendinitis of the longus colli muscle with prevertebral soft tissue fullness; no localized fluid collection/abscess demonstrated. Patient given albuterol and duoneb treatment, dilaudid for pain, dose of vancomycin and NS.    Admitted to ICU per pulmonology recommendation for closer monitoring for acute asthma exacerbation, RSV viral syndrome. LP negative, rocephin dose decreased as meningitis ruled out and patient started on azithromax for likely pneumonia. Pain medication optimized for neck pain, requires opiod but agrees to see a pain management doctor as outpatient. Patient remained stable, afebrile, and steroids tapered appropriately to PO. Transferred to general medical floor. Patient was evaluated by Endocrinologist for uncontrolled Diabetes also on steroids. She was switched over to oral antibiotics for PNA. Patient was started on Tamiflu due to exposure to Influenza. She was evaluated by Dr. Lassiter, pulmonologist agreed patient was stable for discharge to home with close outpatient follow up.

## 2017-12-14 NOTE — PROGRESS NOTE ADULT - SUBJECTIVE AND OBJECTIVE BOX
Patient is a 47y old  Female who presents with a chief complaint of Fever , neck pain (14 Dec 2017 13:53)      INTERVAL HPI: Pt seen and examined. Denies any complaints at this time.     OVERNIGHT EVENTS: none noted  T(F): 98.4 (12-14-17 @ 21:57), Max: 98.7 (12-14-17 @ 05:37)  HR: 98 (12-14-17 @ 21:57) (98 - 120)  BP: 119/76 (12-14-17 @ 21:57) (99/53 - 150/67)  RR: 19 (12-14-17 @ 21:57) (18 - 28)  SpO2: 92% (12-14-17 @ 21:57) (88% - 95%)  I&O's Summary      REVIEW OF SYSTEMS: 12 systems noncontributory except that stated in hpi    PHYSICAL EXAM:  GENERAL: NAD, obese  HEAD:  Atraumatic, Normocephalic  EYES: EOMI, PERRLA, conjunctiva and sclera clear  NECK: Supple, No JVD, Normal thyroid  NERVOUS SYSTEM:  Alert & Oriented X3, Good concentration; Motor Strength 4/5 B/L upper and lower extremities  CHEST/LUNG: diminished breath sounds b/l in mid to low bases  HEART: Regular rate and rhythm; No murmurs, rubs, or gallops  ABDOMEN: Soft, Nontender, Nondistended; Bowel sounds present  EXTREMITIES:  2+ Peripheral Pulses, No clubbing, cyanosis, or edema  SKIN: No rashes or lesions    LABS:                        12.9   11.8  )-----------( 256      ( 14 Dec 2017 05:32 )             39.7     12-14    138  |  104  |  15  ----------------------------<  328<H>  4.0   |  24  |  0.85    Ca    8.5      14 Dec 2017 05:49  Phos  2.9     12-14  Mg     1.7     12-14    TPro  6.8  /  Alb  3.1<L>  /  TBili  0.4  /  DBili  x   /  AST  18  /  ALT  45  /  AlkPhos  126<H>  12-14    PT/INR - ( 13 Dec 2017 12:45 )   PT: 11.6 sec;   INR: 1.06 ratio         PTT - ( 13 Dec 2017 12:45 )  PTT:33.4 sec  Urinalysis Basic - ( 13 Dec 2017 14:30 )    Color: x / Appearance: x / SG: x / pH: x  Gluc: x / Ketone: x  / Bili: x / Urobili: x   Blood: x / Protein: x / Nitrite: x   Leuk Esterase: x / RBC: 0-2 /HPF / WBC 3-5   Sq Epi: x / Non Sq Epi: Few / Bacteria: Occasional      CAPILLARY BLOOD GLUCOSE      POCT Blood Glucose.: 390 mg/dL (14 Dec 2017 21:23)  POCT Blood Glucose.: 350 mg/dL (14 Dec 2017 16:42)  POCT Blood Glucose.: 308 mg/dL (14 Dec 2017 11:33)  POCT Blood Glucose.: 323 mg/dL (14 Dec 2017 08:25)  POCT Blood Glucose.: 267 mg/dL (14 Dec 2017 02:56)      12-13 @ 20:39   No growth to date.  --  --  12-13 @ 18:15   No growth to date.  --  --          MEDICATIONS  (STANDING):  ALBUTerol    90 MICROgram(s) HFA Inhaler 1 Puff(s) Inhalation every 4 hours  ALBUTerol/ipratropium for Nebulization 3 milliLiter(s) Nebulizer every 6 hours  aspirin enteric coated 81 milliGRAM(s) Oral daily  azithromycin  IVPB      calcium carbonate 500 mG (Tums) Chewable 1 Tablet(s) Chew daily  carvedilol 25 milliGRAM(s) Oral every 12 hours  cefTRIAXone   IVPB 1 Gram(s) IV Intermittent every 24 hours  dextrose 5%. 1000 milliLiter(s) (50 mL/Hr) IV Continuous <Continuous>  dextrose 50% Injectable 12.5 Gram(s) IV Push once  dextrose 50% Injectable 25 Gram(s) IV Push once  dextrose 50% Injectable 25 Gram(s) IV Push once  diltiazem    milliGRAM(s) Oral daily  enoxaparin Injectable 40 milliGRAM(s) SubCutaneous daily  folic acid 4 milliGRAM(s) Oral daily  insulin lispro (HumaLOG) corrective regimen sliding scale   SubCutaneous Before meals and at bedtime  insulin lispro Injectable (HumaLOG) 7 Unit(s) SubCutaneous three times a day before meals  lisinopril 20 milliGRAM(s) Oral daily  loratadine 10 milliGRAM(s) Oral daily  mometasone 220 MICROgram(s) Inhaler 1 Puff(s) Inhalation daily  montelukast 10 milliGRAM(s) Oral daily  pantoprazole    Tablet 40 milliGRAM(s) Oral before breakfast  predniSONE   Tablet 60 milliGRAM(s) Oral every 12 hours  simvastatin 20 milliGRAM(s) Oral at bedtime  tiotropium 18 MICROgram(s) Capsule 1 Capsule(s) Inhalation daily    MEDICATIONS  (PRN):  dextrose Gel 1 Dose(s) Oral once PRN Blood Glucose LESS THAN 70 milliGRAM(s)/deciliter  furosemide    Tablet 40 milliGRAM(s) Oral every other day PRN edema  glucagon  Injectable 1 milliGRAM(s) IntraMuscular once PRN Glucose LESS THAN 70 milligrams/deciliter  HYDROmorphone  Injectable 0.5 milliGRAM(s) IV Push every 3 hours PRN Severe Pain (7 - 10)  ketorolac   Injectable 30 milliGRAM(s) IV Push every 8 hours PRN Moderate Pain (4 - 6)  metolazone 5 milliGRAM(s) Oral every 24 hours PRN water retention

## 2017-12-14 NOTE — DISCHARGE NOTE ADULT - SECONDARY DIAGNOSIS.
Diabetes mellitus type 2 in obese URI (upper respiratory infection) Les HTN (hypertension) Prinzmetal Angina MTHFR (methylene THF reductase) deficiency and homocystinuria

## 2017-12-14 NOTE — DIETITIAN INITIAL EVALUATION ADULT. - PROBLEM SELECTOR PLAN 3
-Likely contributing to neck pain as imaging of neck reveals no inflammation or swelling: abscess unlikely  -Continue pain management with Tylenol for mild, morphine for moderate and if severe- dilaudid. Will monitor for changes in respiratory status as these medications can cause respiratory depression.

## 2017-12-14 NOTE — PROGRESS NOTE ADULT - ATTENDING COMMENTS
47 M PMHx asthma, multiple intubations, right heart failure, PUD, HTN, DM2 (on insulin, Victoza), Prinzmetal angina, morbid obesity, MTHFR deficiency admitted with entero/rhinovirus associated asthma exacerbation and calcific tendinitis on longus colli muscle causing severe neck pain, not relieved with multiple analgesics. She is clinically stable and is able to speak continuously without distress. She is placed on prn Toradol for moderate and dilaudid for severe pain. She refuses to take Motrin due to PUD and says Tylenol doesn't work. She understands NSAIDs are the tx of choice for this tendinitis. CSF negative for meningitis, d/c coverage. She has presumed pneumonia, continue azithro, ceftriaxone for now, will d/c in 1-2 days if cx neg. She is comfortable on NC O2. Decrease steroids to 60 mg q12 (po). Continue BDs. HD status is stable, continue coreg, lisinopril, diltiazem, aspirin, statin. She has uncontrolled DM2, increase lantus to 30 units daily, add premeal, continue moderate ISS, change diet to carb restricted. OOB, ambulate. Stable for floor.

## 2017-12-14 NOTE — DIETITIAN INITIAL EVALUATION ADULT. - PROBLEM SELECTOR PLAN 1
-Likely resulting from URI vs early pneumonia  -Consult ICU bed: patient's lungs and breathing is not improving after treatment  -Continue with solumedrol 40 q8  -Continue duonebs, albuterol PRN  -Monitor O2 saturation, oxygen delivery as needed  -Continue ceftriaxone pneumonia  -ambulate as tolerated  -DASH/ TLC diet  -vitals per routine  -Pulmonology consulted: Dr. Lassiter, recommends ICU placement, ICU evaluation pending

## 2017-12-14 NOTE — PROGRESS NOTE ADULT - PROBLEM SELECTOR PLAN 3
-Likely contributing to neck pain as imaging of neck reveals no inflammation or swelling: abscess unlikely  -Continue pain management with Tylenol for mild, morphine for moderate and if severe- dilaudid. Will monitor for changes in respiratory status as these medications can cause respiratory depression. -Likely contributing to neck pain as imaging of neck reveals no inflammation or swelling: abscess unlikely  -Continue pain management with Tylenol for mild, toradol for moderate and if severe- dilaudid. Will monitor for changes in respiratory status as these medications can cause respiratory depression.  apprec pain management consult recs if still refractory to regimen

## 2017-12-14 NOTE — PROGRESS NOTE ADULT - ASSESSMENT
Patient is a 47y old  Female with PMHx of asthma, right heart failure, diabetes,  HTN, MTHFR deficiency and homocysteinuria, Prinzmetal angina and PUD who presents with neck pain x 1 week. On presentation, patient was tachycardic and hypertensive, with mild leukocytosis. CXR showed ill-defined patchy haziness right lower lobe, possibly early pneumonia.  CT of neck showed prominent calcific tendinitis of the longus colli muscle with prevertebral soft tissue fullness; no localized fluid collection/abscess demonstrated. In ED, patient given albuterol and duoneb treatment, dilaudid for pain, dose of vancomycin and NS. Admitted to ICU and started on ceftriaxone, r/o meningitis. LP negative. RVP positive for Rhinovirus.     Neuro-  stable    Respiratory - Asthma exacerbation likely 2/2 URI vs early PNA, improving  - decrease solumedrol to 60 mg q 12 hrs, start to wean  - continue albuterol, duonebs, singulair, mometasone  - currently on O2 via nasal cannula, monitor O2 sat    Cardio-  HTN/ HLD/ right HF/ prinzmetal angina - continue lisinopril, coreg, ASA, cardizem, lasix prn, simvastatin    GI - PUD - continue protonix and Tums, DASH/TLC diet    Endocrine - DM2- continue insulin sliding scale and lantus 30 U, monitor FS, HbA1c pending    ID - possible early PNA - afebrile, VSS, leukocytosis, ESR elevated, CSF gram stain negative, CSF culture pending, meningitis unlikely, continue ceftriaxone, start azithromycin, legionella and strep antigen pending    MSK - Torticollis - Tylenol for mild, toradol for moderate and dilaudid for severe pain, ambulate with assistance     Renal- stable    Heme- MTHFR deficiency/ homocysteinuria - continue folic acid    DVT ppx - Lovenox     - transfer to tele Patient is a 47y old  Female with PMHx of asthma, right heart failure, diabetes,  HTN, MTHFR deficiency and homocysteinuria, Prinzmetal angina and PUD who presents with neck pain x 1 week. On presentation, patient was tachycardic and hypertensive, with mild leukocytosis. CXR showed ill-defined patchy haziness right lower lobe, possibly early pneumonia.  CT of neck showed prominent calcific tendinitis of the longus colli muscle with prevertebral soft tissue fullness; no localized fluid collection/abscess demonstrated. In ED, patient given albuterol and duoneb treatment, dilaudid for pain, dose of vancomycin and NS. Admitted to ICU and started on ceftriaxone, r/o meningitis. LP negative. RVP positive for Rhinovirus.     Neuro-  stable    Respiratory - Asthma exacerbation likely 2/2 URI vs early PNA, improving  - decrease solumedrol to 60 mg q 12 hrs, start to wean  - continue albuterol, duonebs, singulair, mometasone  - currently on O2 via nasal cannula, monitor O2 sat    Cardio-  HTN/ HLD/ right HF/ prinzmetal angina - continue lisinopril, coreg, ASA, cardizem, lasix prn, simvastatin    GI - PUD - continue protonix and Tums, DASH/TLC diet    Endocrine - DM2- continue insulin sliding scale and lantus 30 U, monitor FS, HbA1c pending    ID - possible early PNA - afebrile, tachycardia, leukocytosis, ESR elevated, CSF gram stain negative, CSF culture pending, meningitis unlikely, continue ceftriaxone, start azithromycin, legionella and strep antigen pending    MSK - Torticollis - Tylenol for mild, toradol for moderate and dilaudid for severe pain, ambulate with assistance     Renal- stable    Heme- MTHFR deficiency/ homocysteinuria - continue folic acid    DVT ppx - Lovenox     - transfer to tele

## 2017-12-14 NOTE — PROGRESS NOTE ADULT - SUBJECTIVE AND OBJECTIVE BOX
Patient is a 47y old  Female who presents with a chief complaint of Fever , neck pain (14 Dec 2017 04:16)    24 hour events: ***    REVIEW OF SYSTEMS  Constitutional: No fever, chills, fatigue  Neuro: No headache, numbness, weakness  Resp: No cough, wheezing, shortness of breath  CVS: No chest pain, palpitations, leg swelling  GI: No abdominal pain, nausea, vomiting, diarrhea   : No dysuria, frequency, incontinence  Skin: No itching, burning, rashes, or lesions   Msk: No joint pain or swelling  Psych: No depression, anxiety, mood swings    T(F): 98.7 (12-14-17 @ 05:37), Max: 99.5 (12-13-17 @ 11:58)  HR: 113 (12-14-17 @ 07:00) (90 - 120)  BP: 120/59 (12-14-17 @ 07:00) (120/59 - 155/98)  RR: 21 (12-14-17 @ 07:00) (16 - 24)  SpO2: 93% (12-14-17 @ 07:00) (90% - 96%)  Wt(kg): --        CAPILLARY BLOOD GLUCOSE      I&O's Summary    PHYSICAL EXAM  General:   CNS:   HEENT:   Resp:   CVS:   Abd:   Ext:   Skin:     MEDICATIONS  cefTRIAXone   IVPB IV Intermittent    carvedilol Oral  diltiazem   CD Oral  furosemide    Tablet Oral PRN  lisinopril Oral  metolazone Oral PRN    dextrose 50% Injectable IV Push  dextrose 50% Injectable IV Push  dextrose 50% Injectable IV Push  dextrose Gel Oral PRN  glucagon  Injectable IntraMuscular PRN  insulin glargine Injectable (LANTUS) SubCutaneous  insulin lispro (HumaLOG) corrective regimen sliding scale SubCutaneous  methylPREDNISolone sodium succinate Injectable IV Push  simvastatin Oral    ALBUTerol    90 MICROgram(s) HFA Inhaler Inhalation  ALBUTerol/ipratropium for Nebulization Nebulizer  loratadine Oral  mometasone 220 MICROgram(s) Inhaler Inhalation  montelukast Oral  tiotropium 18 MICROgram(s) Capsule Inhalation        aspirin enteric coated Oral  enoxaparin Injectable SubCutaneous    calcium carbonate 500 mG (Tums) Chewable Chew  pantoprazole    Tablet Oral      dextrose 5%. IV Continuous  folic acid Oral                                14.4   11.3  )-----------( 335      ( 13 Dec 2017 12:45 )             44.9       12-13    139  |  104  |  18  ----------------------------<  128<H>  4.3   |  26  |  0.61    Ca    8.6      13 Dec 2017 12:45    TPro  7.2  /  Alb  3.4  /  TBili  0.5  /  DBili  x   /  AST  20  /  ALT  50  /  AlkPhos  133<H>  12-13    Lactate 1.2           12-13 @ 12:45      CARDIAC MARKERS ( 13 Dec 2017 12:45 )  <.015 ng/mL / x     / 57 U/L / x     / 0.9 ng/mL      PT/INR - ( 13 Dec 2017 12:45 )   PT: 11.6 sec;   INR: 1.06 ratio         PTT - ( 13 Dec 2017 12:45 )  PTT:33.4 sec  Urinalysis Basic - ( 13 Dec 2017 14:30 )    Color: x / Appearance: x / SG: x / pH: x  Gluc: x / Ketone: x  / Bili: x / Urobili: x   Blood: x / Protein: x / Nitrite: x   Leuk Esterase: x / RBC: 0-2 /HPF / WBC 3-5   Sq Epi: x / Non Sq Epi: Few / Bacteria: Occasional      .CSF CSF/ QNS SPECIMEN GIVEN TO [employer] --   No polymorphonuclear cells seen  No organisms seen  by cytocentrifuge 12-13 @ 20:39      Rapid RVP Result: Detected (12-13 @ 13:16)    Radiology: ***  Bedside lung ultrasound: ***  Bedside ECHO: ***    CENTRAL LINE: Y/N          DATE INSERTED:              REMOVE: Y/N  FELIX: Y/N                        DATE INSERTED:              REMOVE: Y/N  A-LINE: Y/N                       DATE INSERTED:              REMOVE: Y/N    GLOBAL ISSUE/BEST PRACTICE  Analgesia:   Sedation:   CAM-ICU:   HOB elevation: yes  Stress ulcer prophylaxis:   VTE prophylaxis:   Glycemic control:   Nutrition:     CODE STATUS: ***  [facility] discussion: Y Patient is a 47y old  Female who presents with a chief complaint of Fever , neck pain (14 Dec 2017 04:16)    24 hour events: ***    REVIEW OF SYSTEMS  Constitutional: No fever, chills, fatigue  Neuro: No headache, numbness, weakness  Resp: No cough, wheezing, shortness of breath  CVS: No chest pain, palpitations, leg swelling  GI: No abdominal pain, nausea, vomiting, diarrhea   : No dysuria, frequency, incontinence  Skin: No itching, burning, rashes, or lesions   Msk: No joint pain or swelling  Psych: No depression, anxiety, mood swings    T(F): 98.7 (12-14-17 @ 05:37), Max: 99.5 (12-13-17 @ 11:58)  HR: 113 (12-14-17 @ 07:00) (90 - 120)  BP: 120/59 (12-14-17 @ 07:00) (120/59 - 155/98)  RR: 21 (12-14-17 @ 07:00) (16 - 24)  SpO2: 93% (12-14-17 @ 07:00) (90% - 96%)  Wt(kg): --        CAPILLARY BLOOD GLUCOSE      I&O's Summary    PHYSICAL EXAM  General:   CNS:   HEENT:   Resp:   CVS:   Abd:   Ext:   Skin:     MEDICATIONS  cefTRIAXone   IVPB IV Intermittent    carvedilol Oral  diltiazem   CD Oral  furosemide    Tablet Oral PRN  lisinopril Oral  metolazone Oral PRN    dextrose 50% Injectable IV Push  dextrose 50% Injectable IV Push  dextrose 50% Injectable IV Push  dextrose Gel Oral PRN  glucagon  Injectable IntraMuscular PRN  insulin glargine Injectable (LANTUS) SubCutaneous  insulin lispro (HumaLOG) corrective regimen sliding scale SubCutaneous  methylPREDNISolone sodium succinate Injectable IV Push  simvastatin Oral    ALBUTerol    90 MICROgram(s) HFA Inhaler Inhalation  ALBUTerol/ipratropium for Nebulization Nebulizer  loratadine Oral  mometasone 220 MICROgram(s) Inhaler Inhalation  montelukast Oral  tiotropium 18 MICROgram(s) Capsule Inhalation        aspirin enteric coated Oral  enoxaparin Injectable SubCutaneous    calcium carbonate 500 mG (Tums) Chewable Chew  pantoprazole    Tablet Oral      dextrose 5%. IV Continuous  folic acid Oral                                14.4   11.3  )-----------( 335      ( 13 Dec 2017 12:45 )             44.9       12-13    139  |  104  |  18  ----------------------------<  128<H>  4.3   |  26  |  0.61    Ca    8.6      13 Dec 2017 12:45    TPro  7.2  /  Alb  3.4  /  TBili  0.5  /  DBili  x   /  AST  20  /  ALT  50  /  AlkPhos  133<H>  12-13    Lactate 1.2           12-13 @ 12:45      CARDIAC MARKERS ( 13 Dec 2017 12:45 )  <.015 ng/mL / x     / 57 U/L / x     / 0.9 ng/mL      PT/INR - ( 13 Dec 2017 12:45 )   PT: 11.6 sec;   INR: 1.06 ratio         PTT - ( 13 Dec 2017 12:45 )  PTT:33.4 sec  Urinalysis Basic - ( 13 Dec 2017 14:30 )    Color: x / Appearance: x / SG: x / pH: x  Gluc: x / Ketone: x  / Bili: x / Urobili: x   Blood: x / Protein: x / Nitrite: x   Leuk Esterase: x / RBC: 0-2 /HPF / WBC 3-5   Sq Epi: x / Non Sq Epi: Few / Bacteria: Occasional      .CSF CSF/ QNS SPECIMEN GIVEN TO [employer] --   No polymorphonuclear cells seen  No organisms seen  by cytocentrifuge 12-13 @ 20:39      Rapid RVP Result: Detected (12-13 @ 13:16)    Radiology:     Xray Chest 1 View AP/PA (12.13.17 @ 16:53)   IMPRESSION: There is ill-defined patchy haziness right lower lobe. Present early   pneumonia, follow-up to resolution is suggested.  There are no pleural   effusion. Tortuous thoracic aorta, heart normal size.    There are no   acute osseous abnormality.       CT Neck Soft Tissue w/ IV Cont (12.13.17 @ 14:47) >  Impression: CT findings compatible with prominent calcific tendinitis of the longus   colli muscle with prevertebral soft tissue fullness; no localized fluid   collection/abscess demonstrated.    CT Head No Cont (12.13.17 @ 14:41) >  Impression: Unremarkable noncontrast head CT.    CT Cervical Spine No Cont (12.13.17 @ 01:59) >  IMPRESSION: Negative for acute fracture or malalignment of the cervical spine. No   critical stenosis. Please note that the soft tissues of the neck and   oropharynx are not fully evaluated on this exam.          Bedside lung ultrasound: ***  Bedside ECHO: ***    CENTRAL LINE: Y/N          DATE INSERTED:              REMOVE: Y/N  FELIX: Y/N                        DATE INSERTED:              REMOVE: Y/N  A-LINE: Y/N                       DATE INSERTED:              REMOVE: Y/N    GLOBAL ISSUE/BEST PRACTICE  Analgesia:   Sedation:   CAM-ICU:   HOB elevation: yes  Stress ulcer prophylaxis:   VTE prophylaxis:   Glycemic control:   Nutrition:     CODE STATUS: ***  [facility] discussion: Y Patient is a 47y old  Female who presents with a chief complaint of Fever , neck pain (14 Dec 2017 04:16)    24 hour events:      REVIEW OF SYSTEMS  Constitutional: No fever, chills  Neuro: No headache, numbness,  + weakness  Resp: + cough, + wheezing,  + shortness of breath  CVS: No chest pain, palpitations, + chest tightness with cough, + leg swelling  GI: + abdominal pain, + nausea, + vomiting, no diarrhea   : No dysuria, frequency, incontinence  Skin: No itching, burning, rashes, or lesions   Msk: No joint pain or swelling    T(F): 98.7 (12-14-17 @ 05:37), Max: 99.5 (12-13-17 @ 11:58)  HR: 113 (12-14-17 @ 07:00) (90 - 120)  BP: 120/59 (12-14-17 @ 07:00) (120/59 - 155/98)  RR: 21 (12-14-17 @ 07:00) (16 - 24)  SpO2: 93% (12-14-17 @ 07:00) (90% - 96%)  Wt(kg): --        CAPILLARY BLOOD GLUCOSE      I&O's Summary    PHYSICAL EXAM  General: lying comfortably in bed, pleasant, NAD  CNS: AAOx3  HEENT: no masses visualized in pharynx, no exudates in posterior pharynx, edema and tenderness in posterior neck, decreased ROM of neck  Resp: diffuse wheezing  CVS: RRR, no mumurs, rubs or [id number]  Abd: periumbilical tenderness (pt states due to granulation tissue), soft, non distended, + bowel sounds  Ext: LE non pitting edema    MEDICATIONS  cefTRIAXone   IVPB IV Intermittent    carvedilol Oral  diltiazem   CD Oral  furosemide    Tablet Oral PRN  lisinopril Oral  metolazone Oral PRN    dextrose 50% Injectable IV Push  dextrose 50% Injectable IV Push  dextrose 50% Injectable IV Push  dextrose Gel Oral PRN  glucagon  Injectable IntraMuscular PRN  insulin glargine Injectable (LANTUS) SubCutaneous  insulin lispro (HumaLOG) corrective regimen sliding scale SubCutaneous  methylPREDNISolone sodium succinate Injectable IV Push  simvastatin Oral    ALBUTerol    90 MICROgram(s) HFA Inhaler Inhalation  ALBUTerol/ipratropium for Nebulization Nebulizer  loratadine Oral  mometasone 220 MICROgram(s) Inhaler Inhalation  montelukast Oral  tiotropium 18 MICROgram(s) Capsule Inhalation        aspirin enteric coated Oral  enoxaparin Injectable SubCutaneous    calcium carbonate 500 mG (Tums) Chewable Chew  pantoprazole    Tablet Oral      dextrose 5%. IV Continuous  folic acid Oral                                14.4   11.3  )-----------( 335      ( 13 Dec 2017 12:45 )             44.9       12-13    139  |  104  |  18  ----------------------------<  128<H>  4.3   |  26  |  0.61    Ca    8.6      13 Dec 2017 12:45    TPro  7.2  /  Alb  3.4  /  TBili  0.5  /  DBili  x   /  AST  20  /  ALT  50  /  AlkPhos  133<H>  12-13    Lactate 1.2           12-13 @ 12:45      CARDIAC MARKERS ( 13 Dec 2017 12:45 )  <.015 ng/mL / x     / 57 U/L / x     / 0.9 ng/mL      PT/INR - ( 13 Dec 2017 12:45 )   PT: 11.6 sec;   INR: 1.06 ratio         PTT - ( 13 Dec 2017 12:45 )  PTT:33.4 sec  Urinalysis Basic - ( 13 Dec 2017 14:30 )    Color: x / Appearance: x / SG: x / pH: x  Gluc: x / Ketone: x  / Bili: x / Urobili: x   Blood: x / Protein: x / Nitrite: x   Leuk Esterase: x / RBC: 0-2 /HPF / WBC 3-5   Sq Epi: x / Non Sq Epi: Few / Bacteria: Occasional      .CSF CSF/ QNS SPECIMEN GIVEN TO [employer] --   No polymorphonuclear cells seen  No organisms seen  by cytocentrifuge 12-13 @ 20:39      Rapid RVP Result: Detected (12-13 @ 13:16)    Radiology:     Xray Chest 1 View AP/PA (12.13.17 @ 16:53)   IMPRESSION: There is ill-defined patchy haziness right lower lobe. Present early   pneumonia, follow-up to resolution is suggested.  There are no pleural   effusion. Tortuous thoracic aorta, heart normal size.    There are no   acute osseous abnormality.       CT Neck Soft Tissue w/ IV Cont (12.13.17 @ 14:47) >  Impression: CT findings compatible with prominent calcific tendinitis of the longus   colli muscle with prevertebral soft tissue fullness; no localized fluid   collection/abscess demonstrated.    CT Head No Cont (12.13.17 @ 14:41) >  Impression: Unremarkable noncontrast head CT.    CT Cervical Spine No Cont (12.13.17 @ 01:59) >  IMPRESSION: Negative for acute fracture or malalignment of the cervical spine. No   critical stenosis. Please note that the soft tissues of the neck and   oropharynx are not fully evaluated on this exam.        CENTRAL LINE: NN  FELIX: N  A-LINE: N    GLOBAL ISSUE/BEST PRACTICE  Analgesia: dilaudid  HOB elevation: yes  Stress ulcer prophylaxis:   VTE prophylaxis: lovenox  Glycemic control: insulin sliding scale  Nutrition: DASH diet    CODE STATUS: Full  [facility] discussion: Y Patient is a 47y old  Female  with PMHx of asthma, right heart failure, diabetes,  HTN, MTHFR deficiency and homocysteinuria, Prinzmetal angina, and PUD who presents with neck pain for the past week. Patient went to [place] and was diagnosed with torticollis. She went to [place] [facility] for same complaint but was sent home after imaging revealed no findings. After discharge, neck pain worsened and developed fever of 100.6. 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 Patient has had 21 intubations in the past.     Patient received albuterol and duoneb treatment, dilaudid for pain, dose of vancomycin and NS in ED. LP was negative. Positive for Rhinovirus.     24 hour events:     No acute overnight events. Patient still complaining of neck pain of severity 8/10 with only slight relief with toradol. She states that dysphagia and SOB are improving, but complains of productive cough and nausea. Admits to headache, dizziness, wheezing, abdominal pain, and chronic LE edema.    REVIEW OF SYSTEMS  Constitutional: No fever, chills  Neuro: + headache, + weakness  Resp: + cough, + wheezing,  + shortness of breath  CVS: No chest pain, palpitations, + leg swelling  GI: + abdominal pain, + nausea, no vomiting, no diarrhea   : No dysuria, frequency, incontinence  Msk: No joint pain or swelling    T(F): 98.7 (12-14-17 @ 05:37), Max: 99.5 (12-13-17 @ 11:58)  HR: 113 (12-14-17 @ 07:00) (90 - 120)  BP: 120/59 (12-14-17 @ 07:00) (120/59 - 155/98)  RR: 21 (12-14-17 @ 07:00) (16 - 24)  SpO2: 93% (12-14-17 @ 07:00) (90% - 96%)  Wt(kg): --        CAPILLARY BLOOD GLUCOSE      I&O's Summary    PHYSICAL EXAM  General: lying comfortably in bed, NAD  CNS: AAOx3  HEENT: no masses visualized in pharynx, no erythema or exudates in posterior pharynx, edema and tenderness of neck, decreased ROM of neck  Resp: diffuse wheezing  CVS: RRR, no murmurs, rubs or [id number]  Abd: periumbilical tenderness (pt states due to hernia), soft, non distended, + bowel sounds  Ext: LE non pitting edema    MEDICATIONS  cefTRIAXone   IVPB IV Intermittent    carvedilol Oral  diltiazem   CD Oral  furosemide    Tablet Oral PRN  lisinopril Oral  metolazone Oral PRN    dextrose 50% Injectable IV Push  dextrose 50% Injectable IV Push  dextrose 50% Injectable IV Push  dextrose Gel Oral PRN  glucagon  Injectable IntraMuscular PRN  insulin glargine Injectable (LANTUS) SubCutaneous  insulin lispro (HumaLOG) corrective regimen sliding scale SubCutaneous  methylPREDNISolone sodium succinate Injectable IV Push  simvastatin Oral    ALBUTerol    90 MICROgram(s) HFA Inhaler Inhalation  ALBUTerol/ipratropium for Nebulization Nebulizer  loratadine Oral  mometasone 220 MICROgram(s) Inhaler Inhalation  montelukast Oral  tiotropium 18 MICROgram(s) Capsule Inhalation        aspirin enteric coated Oral  enoxaparin Injectable SubCutaneous    calcium carbonate 500 mG (Tums) Chewable Chew  pantoprazole    Tablet Oral      dextrose 5%. IV Continuous  folic acid Oral                                14.4   11.3  )-----------( 335      ( 13 Dec 2017 12:45 )             44.9       12-13    139  |  104  |  18  ----------------------------<  128<H>  4.3   |  26  |  0.61    Ca    8.6      13 Dec 2017 12:45    TPro  7.2  /  Alb  3.4  /  TBili  0.5  /  DBili  x   /  AST  20  /  ALT  50  /  AlkPhos  133<H>  12-13    Lactate 1.2           12-13 @ 12:45      CARDIAC MARKERS ( 13 Dec 2017 12:45 )  <.015 ng/mL / x     / 57 U/L / x     / 0.9 ng/mL      PT/INR - ( 13 Dec 2017 12:45 )   PT: 11.6 sec;   INR: 1.06 ratio         PTT - ( 13 Dec 2017 12:45 )  PTT:33.4 sec  Urinalysis Basic - ( 13 Dec 2017 14:30 )    Color: x / Appearance: x / SG: x / pH: x  Gluc: x / Ketone: x  / Bili: x / Urobili: x   Blood: x / Protein: x / Nitrite: x   Leuk Esterase: x / RBC: 0-2 /HPF / WBC 3-5   Sq Epi: x / Non Sq Epi: Few / Bacteria: Occasional      .CSF CSF/ QNS SPECIMEN GIVEN TO [employer] --   No polymorphonuclear cells seen  No organisms seen  by cytocentrifuge 12-13 @ 20:39      Rapid RVP Result: Detected (12-13 @ 13:16)    Radiology:     Xray Chest 1 View AP/PA (12.13.17 @ 16:53)   IMPRESSION: There is ill-defined patchy haziness right lower lobe. Present early   pneumonia, follow-up to resolution is suggested.  There are no pleural   effusion. Tortuous thoracic aorta, heart normal size.    There are no   acute osseous abnormality.       CT Neck Soft Tissue w/ IV Cont (12.13.17 @ 14:47) >  Impression: CT findings compatible with prominent calcific tendinitis of the longus   colli muscle with prevertebral soft tissue fullness; no localized fluid   collection/abscess demonstrated.    CT Head No Cont (12.13.17 @ 14:41) >  Impression: Unremarkable noncontrast head CT.    CT Cervical Spine No Cont (12.13.17 @ 01:59) >  IMPRESSION: Negative for acute fracture or malalignment of the cervical spine. No   critical stenosis. Please note that the soft tissues of the neck and   oropharynx are not fully evaluated on this exam.        CENTRAL LINE: N  FELIX: N  A-LINE: N    GLOBAL ISSUE/BEST PRACTICE  Analgesia: dilaudid  HOB elevation: yes  Stress ulcer prophylaxis:   VTE prophylaxis: lovenox  Glycemic control: insulin sliding scale  Nutrition: DASH diet    CODE STATUS: Full  GOC discussion: Y Patient is a 47y old  Female  with PMHx of asthma, right heart failure, diabetes,  HTN, MTHFR deficiency and homocysteinuria, Prinzmetal angina, and PUD who presents with neck pain for the past week. Patient went to [place] and was diagnosed with torticollis. She went to [place] ER for same complaint but was sent home after imaging revealed no findings. After discharge, neck pain worsened and developed fever of 100.6. 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 Patient has had 21 intubations in the past.     Patient received albuterol and duoneb treatment, dilaudid for pain, dose of vancomycin and NS in ED. LP was negative. Positive for Rhinovirus.     24 hour events:     No acute overnight events. Patient still complaining of neck pain of severity 8/10 with only slight relief with toradol. She states that dysphagia and SOB are improving, but complains of productive cough and nausea. Admits to headache, dizziness, wheezing, abdominal pain, and chronic LE edema.    REVIEW OF SYSTEMS  Constitutional: No fever, chills  Neuro: + headache, + weakness  Resp: + cough, + wheezing,  + shortness of breath  CVS: No chest pain, palpitations, + leg swelling  GI: + abdominal pain, + nausea, no vomiting, no diarrhea   : No dysuria, frequency, incontinence  Msk: No joint pain or swelling    T(F): 98.7 (12-14-17 @ 05:37), Max: 99.5 (12-13-17 @ 11:58)  HR: 113 (12-14-17 @ 07:00) (90 - 120)  BP: 120/59 (12-14-17 @ 07:00) (120/59 - 155/98)  RR: 21 (12-14-17 @ 07:00) (16 - 24)  SpO2: 93% (12-14-17 @ 07:00) (90% - 96%)  Wt(kg): --        CAPILLARY BLOOD GLUCOSE      I&O's Summary    PHYSICAL EXAM  General: lying comfortably in bed, NAD  CNS: AAOx3  HEENT: no masses visualized in pharynx, no erythema or exudates in posterior pharynx, edema and tenderness of neck, decreased ROM of neck  Resp: diffuse wheezing  CVS: RRR, no murmurs, rubs or [id number]  Abd: periumbilical tenderness (pt states due to hernia), soft, non distended, + bowel sounds  Ext: LE non pitting edema    MEDICATIONS  cefTRIAXone   IVPB IV Intermittent    carvedilol Oral  diltiazem   CD Oral  furosemide    Tablet Oral PRN  lisinopril Oral  metolazone Oral PRN    dextrose 50% Injectable IV Push  dextrose 50% Injectable IV Push  dextrose 50% Injectable IV Push  dextrose Gel Oral PRN  glucagon  Injectable IntraMuscular PRN  insulin glargine Injectable (LANTUS) SubCutaneous  insulin lispro (HumaLOG) corrective regimen sliding scale SubCutaneous  methylPREDNISolone sodium succinate Injectable IV Push  simvastatin Oral    ALBUTerol    90 MICROgram(s) HFA Inhaler Inhalation  ALBUTerol/ipratropium for Nebulization Nebulizer  loratadine Oral  mometasone 220 MICROgram(s) Inhaler Inhalation  montelukast Oral  tiotropium 18 MICROgram(s) Capsule Inhalation        aspirin enteric coated Oral  enoxaparin Injectable SubCutaneous    calcium carbonate 500 mG (Tums) Chewable Chew  pantoprazole    Tablet Oral      dextrose 5%. IV Continuous  folic acid Oral                                14.4   11.3  )-----------( 335      ( 13 Dec 2017 12:45 )             44.9       12-13    139  |  104  |  18  ----------------------------<  128<H>  4.3   |  26  |  0.61    Ca    8.6      13 Dec 2017 12:45    TPro  7.2  /  Alb  3.4  /  TBili  0.5  /  DBili  x   /  AST  20  /  ALT  50  /  AlkPhos  133<H>  12-13    Lactate 1.2           12-13 @ 12:45      CARDIAC MARKERS ( 13 Dec 2017 12:45 )  <.015 ng/mL / x     / 57 U/L / x     / 0.9 ng/mL      PT/INR - ( 13 Dec 2017 12:45 )   PT: 11.6 sec;   INR: 1.06 ratio         PTT - ( 13 Dec 2017 12:45 )  PTT:33.4 sec  Urinalysis Basic - ( 13 Dec 2017 14:30 )    Color: x / Appearance: x / SG: x / pH: x  Gluc: x / Ketone: x  / Bili: x / Urobili: x   Blood: x / Protein: x / Nitrite: x   Leuk Esterase: x / RBC: 0-2 /HPF / WBC 3-5   Sq Epi: x / Non Sq Epi: Few / Bacteria: Occasional      .CSF CSF/ QNS SPECIMEN GIVEN TO [employer] --   No polymorphonuclear cells seen  No organisms seen  by cytocentrifuge 12-13 @ 20:39      Rapid RVP Result: Detected (12-13 @ 13:16)    Radiology:     Xray Chest 1 View AP/PA (12.13.17 @ 16:53)   IMPRESSION: There is ill-defined patchy haziness right lower lobe. Present early   pneumonia, follow-up to resolution is suggested.  There are no pleural   effusion. Tortuous thoracic aorta, heart normal size.    There are no   acute osseous abnormality.       CT Neck Soft Tissue w/ IV Cont (12.13.17 @ 14:47) >  Impression: CT findings compatible with prominent calcific tendinitis of the longus   colli muscle with prevertebral soft tissue fullness; no localized fluid   collection/abscess demonstrated.    CT Head No Cont (12.13.17 @ 14:41) >  Impression: Unremarkable noncontrast head CT.    CT Cervical Spine No Cont (12.13.17 @ 01:59) >  IMPRESSION: Negative for acute fracture or malalignment of the cervical spine. No   critical stenosis. Please note that the soft tissues of the neck and   oropharynx are not fully evaluated on this exam.        CENTRAL LINE: N  FELIX: N  A-LINE: N    GLOBAL ISSUE/BEST PRACTICE  Analgesia: dilaudid  HOB elevation: yes  VTE prophylaxis: lovenox  Glycemic control: insulin sliding scale  Nutrition: DASH diet    CODE STATUS: Full  GOC discussion: Y Patient is a 47y old  Female  with PMHx of asthma, right heart failure, diabetes,  HTN, MTHFR deficiency and homocysteinuria, Prinzmetal angina, and PUD who presents with neck pain for the past week. Patient went to [place] and was diagnosed with torticollis. She went to [place] ER for same complaint but was sent home after imaging revealed no findings. After discharge, neck pain worsened and developed fever of 100.6. 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 Patient has had 21 intubations in the past.     Patient received albuterol and duoneb treatment, dilaudid for pain, dose of vancomycin and NS in ED. LP was negative. Positive for Rhinovirus.     24 hour events:     No acute overnight events. Patient seen and examined at bedside. Patient still complaining of neck pain of severity 8/10 with only slight relief with toradol. She states that dysphagia and SOB are improving, but complains of productive cough and nausea. Admits to headache, dizziness, wheezing, abdominal pain, and chronic LE edema.    REVIEW OF SYSTEMS  Constitutional: No fever, chills  Neuro: + headache, + weakness  Resp: + cough, + wheezing,  + shortness of breath  CVS: No chest pain, palpitations, + leg swelling  GI: + abdominal pain, + nausea, no vomiting, no diarrhea   : No dysuria, frequency, incontinence  Msk: No joint pain or swelling    T(F): 98.7 (12-14-17 @ 05:37), Max: 99.5 (12-13-17 @ 11:58)  HR: 113 (12-14-17 @ 07:00) (90 - 120)  BP: 120/59 (12-14-17 @ 07:00) (120/59 - 155/98)  RR: 21 (12-14-17 @ 07:00) (16 - 24)  SpO2: 93% (12-14-17 @ 07:00) (90% - 96%)  Wt(kg): --        CAPILLARY BLOOD GLUCOSE      I&O's Summary    PHYSICAL EXAM  General: lying comfortably in bed, NAD  CNS: AAOx3  HEENT: no masses visualized in pharynx, no erythema or exudates in posterior pharynx, edema and tenderness of neck, decreased ROM of neck  Resp: diffuse wheezing  CVS: RRR, no murmurs, rubs or [id number]  Abd: periumbilical tenderness (pt states due to hernia), soft, non distended, + bowel sounds  Ext: LE non pitting edema    MEDICATIONS  cefTRIAXone   IVPB IV Intermittent    carvedilol Oral  diltiazem   CD Oral  furosemide    Tablet Oral PRN  lisinopril Oral  metolazone Oral PRN    dextrose 50% Injectable IV Push  dextrose 50% Injectable IV Push  dextrose 50% Injectable IV Push  dextrose Gel Oral PRN  glucagon  Injectable IntraMuscular PRN  insulin glargine Injectable (LANTUS) SubCutaneous  insulin lispro (HumaLOG) corrective regimen sliding scale SubCutaneous  methylPREDNISolone sodium succinate Injectable IV Push  simvastatin Oral    ALBUTerol    90 MICROgram(s) HFA Inhaler Inhalation  ALBUTerol/ipratropium for Nebulization Nebulizer  loratadine Oral  mometasone 220 MICROgram(s) Inhaler Inhalation  montelukast Oral  tiotropium 18 MICROgram(s) Capsule Inhalation        aspirin enteric coated Oral  enoxaparin Injectable SubCutaneous    calcium carbonate 500 mG (Tums) Chewable Chew  pantoprazole    Tablet Oral      dextrose 5%. IV Continuous  folic acid Oral                                14.4   11.3  )-----------( 335      ( 13 Dec 2017 12:45 )             44.9       12-13    139  |  104  |  18  ----------------------------<  128<H>  4.3   |  26  |  0.61    Ca    8.6      13 Dec 2017 12:45    TPro  7.2  /  Alb  3.4  /  TBili  0.5  /  DBili  x   /  AST  20  /  ALT  50  /  AlkPhos  133<H>  12-13    Lactate 1.2           12-13 @ 12:45      CARDIAC MARKERS ( 13 Dec 2017 12:45 )  <.015 ng/mL / x     / 57 U/L / x     / 0.9 ng/mL      PT/INR - ( 13 Dec 2017 12:45 )   PT: 11.6 sec;   INR: 1.06 ratio         PTT - ( 13 Dec 2017 12:45 )  PTT:33.4 sec  Urinalysis Basic - ( 13 Dec 2017 14:30 )    Color: x / Appearance: x / SG: x / pH: x  Gluc: x / Ketone: x  / Bili: x / Urobili: x   Blood: x / Protein: x / Nitrite: x   Leuk Esterase: x / RBC: 0-2 /HPF / WBC 3-5   Sq Epi: x / Non Sq Epi: Few / Bacteria: Occasional      .CSF CSF/ QNS SPECIMEN GIVEN TO [employer] --   No polymorphonuclear cells seen  No organisms seen  by cytocentrifuge 12-13 @ 20:39      Rapid RVP Result: Detected (12-13 @ 13:16)    Radiology:     Xray Chest 1 View AP/PA (12.13.17 @ 16:53)   IMPRESSION: There is ill-defined patchy haziness right lower lobe. Present early   pneumonia, follow-up to resolution is suggested.  There are no pleural   effusion. Tortuous thoracic aorta, heart normal size.    There are no   acute osseous abnormality.       CT Neck Soft Tissue w/ IV Cont (12.13.17 @ 14:47) >  Impression: CT findings compatible with prominent calcific tendinitis of the longus   colli muscle with prevertebral soft tissue fullness; no localized fluid   collection/abscess demonstrated.    CT Head No Cont (12.13.17 @ 14:41) >  Impression: Unremarkable noncontrast head CT.    CT Cervical Spine No Cont (12.13.17 @ 01:59) >  IMPRESSION: Negative for acute fracture or malalignment of the cervical spine. No   critical stenosis. Please note that the soft tissues of the neck and   oropharynx are not fully evaluated on this exam.        CENTRAL LINE: N  FELIX: N  A-LINE: N    GLOBAL ISSUE/BEST PRACTICE  Analgesia: dilaudid  HOB elevation: yes  VTE prophylaxis: lovenox  Glycemic control: insulin sliding scale  Nutrition: DASH diet    CODE STATUS: Full  GOC discussion: Y

## 2017-12-14 NOTE — DISCHARGE NOTE ADULT - MEDICATION SUMMARY - MEDICATIONS TO CHANGE
I will SWITCH the dose or number of times a day I take the medications listed below when I get home from the hospital:    predniSONE 20 mg oral tablet  -- 2 tab(s) by mouth once a day    insulin lispro 100 units/mL subcutaneous solution  -- 15 unit(s) subcutaneous 3 times a day (before meals)

## 2017-12-14 NOTE — DISCHARGE NOTE ADULT - ADDITIONAL INSTRUCTIONS
Follow up with Pulmonologist Dr. Lassiter tomorrow or Tuesday to taper steroids and for close asthma exacerbation follow up

## 2017-12-14 NOTE — PROGRESS NOTE ADULT - SUBJECTIVE AND OBJECTIVE BOX
Patient is a 47y old  Female who presents with a chief complaint of Fever , neck pain (14 Dec 2017 13:53)      INTERVAL HPI/OVERNIGHT EVENTS:    Feels better. Shortness of breath and wheezing have improved    MEDICATIONS  (STANDING):  ALBUTerol    90 MICROgram(s) HFA Inhaler 1 Puff(s) Inhalation every 4 hours  ALBUTerol/ipratropium for Nebulization 3 milliLiter(s) Nebulizer every 6 hours  aspirin enteric coated 81 milliGRAM(s) Oral daily  azithromycin  IVPB      calcium carbonate 500 mG (Tums) Chewable 1 Tablet(s) Chew daily  carvedilol 25 milliGRAM(s) Oral every 12 hours  cefTRIAXone   IVPB 1 Gram(s) IV Intermittent every 24 hours  dextrose 5%. 1000 milliLiter(s) (50 mL/Hr) IV Continuous <Continuous>  dextrose 50% Injectable 12.5 Gram(s) IV Push once  dextrose 50% Injectable 25 Gram(s) IV Push once  dextrose 50% Injectable 25 Gram(s) IV Push once  diltiazem    milliGRAM(s) Oral daily  enoxaparin Injectable 40 milliGRAM(s) SubCutaneous daily  folic acid 4 milliGRAM(s) Oral daily  insulin lispro (HumaLOG) corrective regimen sliding scale   SubCutaneous Before meals and at bedtime  insulin lispro Injectable (HumaLOG) 7 Unit(s) SubCutaneous three times a day before meals  lisinopril 20 milliGRAM(s) Oral daily  loratadine 10 milliGRAM(s) Oral daily  mometasone 220 MICROgram(s) Inhaler 1 Puff(s) Inhalation daily  montelukast 10 milliGRAM(s) Oral daily  pantoprazole    Tablet 40 milliGRAM(s) Oral before breakfast  predniSONE   Tablet 60 milliGRAM(s) Oral every 12 hours  simvastatin 20 milliGRAM(s) Oral at bedtime  tiotropium 18 MICROgram(s) Capsule 1 Capsule(s) Inhalation daily      MEDICATIONS  (PRN):  dextrose Gel 1 Dose(s) Oral once PRN Blood Glucose LESS THAN 70 milliGRAM(s)/deciliter  furosemide    Tablet 40 milliGRAM(s) Oral every other day PRN edema  glucagon  Injectable 1 milliGRAM(s) IntraMuscular once PRN Glucose LESS THAN 70 milligrams/deciliter  HYDROmorphone  Injectable 0.5 milliGRAM(s) IV Push every 3 hours PRN Severe Pain (7 - 10)  ketorolac   Injectable 30 milliGRAM(s) IV Push every 8 hours PRN Moderate Pain (4 - 6)  metolazone 5 milliGRAM(s) Oral every 24 hours PRN water retention      Allergies    Depakote (Unknown)  Diprivan (Nausea)  gadolinium containing compounds (Angioedema)  latex (Unknown)  levofloxacin (Anaphylaxis (Mod to Severe))  nutrasweet (Unknown)  Ofirmev (Anaphylaxis)  propofol (Unknown)    Intolerances        PAST MEDICAL & SURGICAL HISTORY:  Essential hypertension, hypertension with unspecified goal  Diabetes mellitus type 2 in obese  Migraine  MTHFR (methylene THF reductase) deficiency and homocystinuria  Polycystic disease, ovaries  CVA (cerebral vascular accident)  GERD (gastroesophageal reflux disease)  Obesity  Prinzmetal Angina  Asthma: intubated in the past  H/O ovarian cystectomy  Salpingo-oophoritis: oophrectomy  History of tonsillectomy  History of Cholecystectomy  Status Post Nissen Fundoplication (with Gastrostomy Tube Placement)      Vital Signs Last 24 Hrs  T(C): 36.9 (14 Dec 2017 21:57), Max: 37.1 (14 Dec 2017 05:37)  T(F): 98.4 (14 Dec 2017 21:57), Max: 98.7 (14 Dec 2017 05:37)  HR: 98 (14 Dec 2017 21:57) (98 - 120)  BP: 119/76 (14 Dec 2017 21:57) (99/53 - 150/67)  BP(mean): 73 (14 Dec 2017 19:00) (71 - 119)  RR: 19 (14 Dec 2017 21:57) (18 - 28)  SpO2: 92% (14 Dec 2017 21:57) (88% - 95%)    PHYSICAL EXAMINATION:    GENERAL: The patient is awake and alert in no apparent distress.     HEENT: Head is normocephalic and atraumatic. Extraocular muscles are intact. Mucous membranes are moist.    NECK: Supple.    LUNGS: Mild wheezes bilateral    HEART: Regular rate and rhythm without murmur.    ABDOMEN: Soft, nontender, and nondistended.      EXTREMITIES: Without any cyanosis, clubbing, rash, lesions or edema.    NEUROLOGIC: Grossly intact.    SKIN: No ulceration or induration present.      LABS:                        12.9   11.8  )-----------( 256      ( 14 Dec 2017 05:32 )             39.7     12-14    138  |  104  |  15  ----------------------------<  328<H>  4.0   |  24  |  0.85    Ca    8.5      14 Dec 2017 05:49  Phos  2.9     12-14  Mg     1.7     12-14    TPro  6.8  /  Alb  3.1<L>  /  TBili  0.4  /  DBili  x   /  AST  18  /  ALT  45  /  AlkPhos  126<H>  12-14    PT/INR - ( 13 Dec 2017 12:45 )   PT: 11.6 sec;   INR: 1.06 ratio         PTT - ( 13 Dec 2017 12:45 )  PTT:33.4 sec  Urinalysis Basic - ( 13 Dec 2017 14:30 )    Color: x / Appearance: x / SG: x / pH: x  Gluc: x / Ketone: x  / Bili: x / Urobili: x   Blood: x / Protein: x / Nitrite: x   Leuk Esterase: x / RBC: 0-2 /HPF / WBC 3-5   Sq Epi: x / Non Sq Epi: Few / Bacteria: Occasional        CARDIAC MARKERS ( 13 Dec 2017 12:45 )  <.015 ng/mL / x     / 57 U/L / x     / 0.9 ng/mL            Procalcitonin, Serum: <0.05 (12-14-17 @ 05:32)      MICROBIOLOGY:  Culture Results:   No growth to date. (12-13 @ 20:39)  Culture Results:   Testing in progress (12-13 @ 20:39)  Culture Results:   No growth to date. (12-13 @ 18:15)  Culture Results:   No growth to date. (12-13 @ 18:15)      RADIOLOGY & ADDITIONAL STUDIES:    Assessment:    Asthma with exacerbation  Possible Pneumonia  Viral syndrome    Plan:    Taper Prednisone  Continue antibiotics  Repeat Chest x ray  Nebulizer treatments

## 2017-12-14 NOTE — DISCHARGE NOTE ADULT - CARE PLAN
Principal Discharge DX:	Asthma exacerbation, non-allergic, unspecified asthma severity  Goal:	stable  Instructions for follow-up, activity and diet:	Continue ceftin and azithromycin for 3 more days to complete 7 day course of antibiotics for PNA  Continue prednisone and follow up closely with pulmonologist for tapering  Continue nebulizers and home medications  Secondary Diagnosis:	Diabetes mellitus type 2 in obese  Instructions for follow-up, activity and diet:	Increase humalog 12 units tid before meals  Continue lantus 30 units twice a day  goal -180  ADA diet  Secondary Diagnosis:	URI (upper respiratory infection)  Instructions for follow-up, activity and diet:	Continue Tamiflu for Influenza prophylaxis for 2 more days  Secondary Diagnosis:	Torticollis  Instructions for follow-up, activity and diet:	Continue pain medication  Follow up with pain management doctor if pain does not improve  Secondary Diagnosis:	HTN (hypertension)  Instructions for follow-up, activity and diet:	Continue home medications  Secondary Diagnosis:	Prinzmetal Angina  Instructions for follow-up, activity and diet:	Continue BP medications and follow up with cardiologist as schedule  Secondary Diagnosis:	MTHFR (methylene THF reductase) deficiency and homocystinuria  Instructions for follow-up, activity and diet:	Continue follow up with primary care as scheduled

## 2017-12-14 NOTE — PROGRESS NOTE ADULT - ASSESSMENT
Patient is a 47 year old female with PMH of asthma, right heart failure, diabetes, Essential HTN, MTHFR deficiency and homocysteinuria, Prinzmetal angina presents complaining of neck pain that has been constant for the past week, admitted with asthma exacerbation

## 2017-12-15 LAB
ALBUMIN SERPL ELPH-MCNC: 2.9 G/DL — LOW (ref 3.3–5)
ALP SERPL-CCNC: 113 U/L — SIGNIFICANT CHANGE UP (ref 40–120)
ALT FLD-CCNC: 37 U/L — SIGNIFICANT CHANGE UP (ref 12–78)
ANION GAP SERPL CALC-SCNC: 11 MMOL/L — SIGNIFICANT CHANGE UP (ref 5–17)
AST SERPL-CCNC: 12 U/L — LOW (ref 15–37)
BASOPHILS # BLD AUTO: 0 K/UL — SIGNIFICANT CHANGE UP (ref 0–0.2)
BASOPHILS NFR BLD AUTO: 0.3 % — SIGNIFICANT CHANGE UP (ref 0–2)
BILIRUB SERPL-MCNC: 0.4 MG/DL — SIGNIFICANT CHANGE UP (ref 0.2–1.2)
BUN SERPL-MCNC: 21 MG/DL — SIGNIFICANT CHANGE UP (ref 7–23)
CALCIUM SERPL-MCNC: 8 MG/DL — LOW (ref 8.5–10.1)
CHLORIDE SERPL-SCNC: 104 MMOL/L — SIGNIFICANT CHANGE UP (ref 96–108)
CO2 SERPL-SCNC: 22 MMOL/L — SIGNIFICANT CHANGE UP (ref 22–31)
CREAT SERPL-MCNC: 0.86 MG/DL — SIGNIFICANT CHANGE UP (ref 0.5–1.3)
CRP SERPL-MCNC: 3 MG/DL — HIGH (ref 0–0.4)
EOSINOPHIL # BLD AUTO: 0 K/UL — SIGNIFICANT CHANGE UP (ref 0–0.5)
EOSINOPHIL NFR BLD AUTO: 0 % — SIGNIFICANT CHANGE UP (ref 0–6)
GLUCOSE SERPL-MCNC: 371 MG/DL — HIGH (ref 70–99)
HCT VFR BLD CALC: 40.1 % — SIGNIFICANT CHANGE UP (ref 34.5–45)
HGB BLD-MCNC: 12.5 G/DL — SIGNIFICANT CHANGE UP (ref 11.5–15.5)
LYMPHOCYTES # BLD AUTO: 1.5 K/UL — SIGNIFICANT CHANGE UP (ref 1–3.3)
LYMPHOCYTES # BLD AUTO: 9.9 % — LOW (ref 13–44)
MAGNESIUM SERPL-MCNC: 2.3 MG/DL — SIGNIFICANT CHANGE UP (ref 1.6–2.6)
MCHC RBC-ENTMCNC: 26.9 PG — LOW (ref 27–34)
MCHC RBC-ENTMCNC: 31 GM/DL — LOW (ref 32–36)
MCV RBC AUTO: 86.5 FL — SIGNIFICANT CHANGE UP (ref 80–100)
MONOCYTES # BLD AUTO: 0.9 K/UL — SIGNIFICANT CHANGE UP (ref 0–0.9)
MONOCYTES NFR BLD AUTO: 5.6 % — SIGNIFICANT CHANGE UP (ref 1–9)
NEUTROPHILS # BLD AUTO: 12.8 K/UL — HIGH (ref 1.8–7.4)
NEUTROPHILS NFR BLD AUTO: 84.1 % — HIGH (ref 43–77)
PHOSPHATE SERPL-MCNC: 2.9 MG/DL — SIGNIFICANT CHANGE UP (ref 2.5–4.5)
PLATELET # BLD AUTO: 273 K/UL — SIGNIFICANT CHANGE UP (ref 150–400)
POTASSIUM SERPL-MCNC: 4.3 MMOL/L — SIGNIFICANT CHANGE UP (ref 3.5–5.3)
POTASSIUM SERPL-SCNC: 4.3 MMOL/L — SIGNIFICANT CHANGE UP (ref 3.5–5.3)
PROCALCITONIN SERPL-MCNC: <0.05 NG/ML — HIGH (ref 0–0.04)
PROT SERPL-MCNC: 6.5 G/DL — SIGNIFICANT CHANGE UP (ref 6–8.3)
RBC # BLD: 4.64 M/UL — SIGNIFICANT CHANGE UP (ref 3.8–5.2)
RBC # FLD: 13.4 % — SIGNIFICANT CHANGE UP (ref 10.3–14.5)
SODIUM SERPL-SCNC: 137 MMOL/L — SIGNIFICANT CHANGE UP (ref 135–145)
VDRL CSF-TITR: NEGATIVE — SIGNIFICANT CHANGE UP
WBC # BLD: 15.2 K/UL — HIGH (ref 3.8–10.5)
WBC # FLD AUTO: 15.2 K/UL — HIGH (ref 3.8–10.5)

## 2017-12-15 PROCEDURE — 71020: CPT | Mod: 26

## 2017-12-15 PROCEDURE — 99233 SBSQ HOSP IP/OBS HIGH 50: CPT

## 2017-12-15 RX ORDER — DIPHENHYDRAMINE HCL 50 MG
25 CAPSULE ORAL ONCE
Qty: 0 | Refills: 0 | Status: COMPLETED | OUTPATIENT
Start: 2017-12-15 | End: 2017-12-15

## 2017-12-15 RX ORDER — IPRATROPIUM/ALBUTEROL SULFATE 18-103MCG
3 AEROSOL WITH ADAPTER (GRAM) INHALATION ONCE
Qty: 0 | Refills: 0 | Status: COMPLETED | OUTPATIENT
Start: 2017-12-15 | End: 2017-12-15

## 2017-12-15 RX ADMIN — HYDROMORPHONE HYDROCHLORIDE 0.5 MILLIGRAM(S): 2 INJECTION INTRAMUSCULAR; INTRAVENOUS; SUBCUTANEOUS at 23:59

## 2017-12-15 RX ADMIN — INSULIN GLARGINE 30 UNIT(S): 100 INJECTION, SOLUTION SUBCUTANEOUS at 08:11

## 2017-12-15 RX ADMIN — Medication 1 TABLET(S): at 12:14

## 2017-12-15 RX ADMIN — ENOXAPARIN SODIUM 40 MILLIGRAM(S): 100 INJECTION SUBCUTANEOUS at 12:14

## 2017-12-15 RX ADMIN — PANTOPRAZOLE SODIUM 40 MILLIGRAM(S): 20 TABLET, DELAYED RELEASE ORAL at 06:24

## 2017-12-15 RX ADMIN — CARVEDILOL PHOSPHATE 25 MILLIGRAM(S): 80 CAPSULE, EXTENDED RELEASE ORAL at 06:23

## 2017-12-15 RX ADMIN — HYDROMORPHONE HYDROCHLORIDE 0.5 MILLIGRAM(S): 2 INJECTION INTRAMUSCULAR; INTRAVENOUS; SUBCUTANEOUS at 10:09

## 2017-12-15 RX ADMIN — Medication 10: at 08:12

## 2017-12-15 RX ADMIN — MOMETASONE FUROATE 1 PUFF(S): 220 INHALANT RESPIRATORY (INHALATION) at 06:39

## 2017-12-15 RX ADMIN — Medication 3 MILLILITER(S): at 07:32

## 2017-12-15 RX ADMIN — Medication 12: at 21:27

## 2017-12-15 RX ADMIN — HYDROMORPHONE HYDROCHLORIDE 0.5 MILLIGRAM(S): 2 INJECTION INTRAMUSCULAR; INTRAVENOUS; SUBCUTANEOUS at 04:08

## 2017-12-15 RX ADMIN — Medication 30 MILLIGRAM(S): at 21:45

## 2017-12-15 RX ADMIN — Medication 25 MILLIGRAM(S): at 12:04

## 2017-12-15 RX ADMIN — Medication 10: at 17:30

## 2017-12-15 RX ADMIN — Medication 75 MILLIGRAM(S): at 18:03

## 2017-12-15 RX ADMIN — Medication 3 MILLILITER(S): at 13:29

## 2017-12-15 RX ADMIN — Medication 7 UNIT(S): at 12:13

## 2017-12-15 RX ADMIN — HYDROMORPHONE HYDROCHLORIDE 0.5 MILLIGRAM(S): 2 INJECTION INTRAMUSCULAR; INTRAVENOUS; SUBCUTANEOUS at 15:33

## 2017-12-15 RX ADMIN — CARVEDILOL PHOSPHATE 25 MILLIGRAM(S): 80 CAPSULE, EXTENDED RELEASE ORAL at 17:38

## 2017-12-15 RX ADMIN — Medication 6: at 12:13

## 2017-12-15 RX ADMIN — MONTELUKAST 10 MILLIGRAM(S): 4 TABLET, CHEWABLE ORAL at 12:20

## 2017-12-15 RX ADMIN — Medication 60 MILLIGRAM(S): at 06:23

## 2017-12-15 RX ADMIN — HYDROMORPHONE HYDROCHLORIDE 0.5 MILLIGRAM(S): 2 INJECTION INTRAMUSCULAR; INTRAVENOUS; SUBCUTANEOUS at 04:33

## 2017-12-15 RX ADMIN — Medication 30 MILLIGRAM(S): at 21:35

## 2017-12-15 RX ADMIN — LORATADINE 10 MILLIGRAM(S): 10 TABLET ORAL at 12:14

## 2017-12-15 RX ADMIN — Medication 3 MILLILITER(S): at 19:47

## 2017-12-15 RX ADMIN — Medication 7 UNIT(S): at 17:31

## 2017-12-15 RX ADMIN — Medication 7 UNIT(S): at 08:12

## 2017-12-15 RX ADMIN — CEFTRIAXONE 100 GRAM(S): 500 INJECTION, POWDER, FOR SOLUTION INTRAMUSCULAR; INTRAVENOUS at 15:00

## 2017-12-15 RX ADMIN — Medication 4 MILLIGRAM(S): at 12:14

## 2017-12-15 RX ADMIN — Medication 30 MILLIGRAM(S): at 06:42

## 2017-12-15 RX ADMIN — AZITHROMYCIN 255 MILLIGRAM(S): 500 TABLET, FILM COATED ORAL at 10:08

## 2017-12-15 RX ADMIN — Medication 60 MILLIGRAM(S): at 17:31

## 2017-12-15 RX ADMIN — Medication 300 MILLIGRAM(S): at 06:23

## 2017-12-15 RX ADMIN — HYDROMORPHONE HYDROCHLORIDE 0.5 MILLIGRAM(S): 2 INJECTION INTRAMUSCULAR; INTRAVENOUS; SUBCUTANEOUS at 21:15

## 2017-12-15 RX ADMIN — SIMVASTATIN 20 MILLIGRAM(S): 20 TABLET, FILM COATED ORAL at 21:27

## 2017-12-15 RX ADMIN — Medication 81 MILLIGRAM(S): at 12:14

## 2017-12-15 RX ADMIN — Medication 3 MILLILITER(S): at 16:09

## 2017-12-15 RX ADMIN — LISINOPRIL 20 MILLIGRAM(S): 2.5 TABLET ORAL at 06:23

## 2017-12-15 RX ADMIN — HYDROMORPHONE HYDROCHLORIDE 0.5 MILLIGRAM(S): 2 INJECTION INTRAMUSCULAR; INTRAVENOUS; SUBCUTANEOUS at 20:55

## 2017-12-15 RX ADMIN — Medication 3 MILLILITER(S): at 01:56

## 2017-12-15 RX ADMIN — Medication 30 MILLIGRAM(S): at 07:13

## 2017-12-15 NOTE — PROGRESS NOTE ADULT - PROBLEM SELECTOR PLAN 3
-Likely contributing to neck pain as imaging of neck reveals no inflammation or swelling: abscess unlikely  -Continue pain management with Tylenol for mild, toradol for moderate and if severe- dilaudid. Will monitor for changes in respiratory status as these medications can cause respiratory depression.  apprec pain management consult recs if still refractory to regimen

## 2017-12-15 NOTE — PROGRESS NOTE ADULT - SUBJECTIVE AND OBJECTIVE BOX
Patient is a 47y old  Female who presents with a chief complaint of Fever , neck pain (14 Dec 2017 13:53)      INTERVAL HPI: Pt seen and examined. States she is still sob and has slight rash with antibiotics but would like to continue.     OVERNIGHT EVENTS: none noted  T(F): 98.1 (12-15-17 @ 13:35), Max: 98.4 (12-14-17 @ 21:57)  HR: 91 (12-15-17 @ 16:05) (78 - 619)  BP: 114/69 (12-15-17 @ 13:35) (99/64 - 119/76)  RR: 16 (12-15-17 @ 13:35) (14 - 19)  SpO2: 97% (12-15-17 @ 16:05) (90% - 97%)  I&O's Summary    15 Dec 2017 07:01  -  15 Dec 2017 19:19  --------------------------------------------------------  IN: 480 mL / OUT: 0 mL / NET: 480 mL        REVIEW OF SYSTEMS: 12 systems reviewed noncontributory other than that stated in hpi    PHYSICAL EXAM:  GENERAL: NAD, well-groomed, well-developed  HEAD:  Atraumatic, Normocephalic  EYES: EOMI, PERRLA, conjunctiva and sclera clear  ENMT: No tonsillar erythema, exudates, or enlargement; Moist mucous membranes, Good dentition, No lesions  NECK: Supple, No JVD, Normal thyroid  NERVOUS SYSTEM:  Alert & Oriented X3, Good concentration; Motor Strength 5/5 B/L upper and lower extremities; DTRs 2+ intact and symmetric  CHEST/LUNG: Clear to percussion bilaterally; No rales, rhonchi, wheezing, or rubs  HEART: Regular rate and rhythm; No murmurs, rubs, or gallops  ABDOMEN: Soft, Nontender, Nondistended; Bowel sounds present  EXTREMITIES:  2+ Peripheral Pulses, No clubbing, cyanosis, or edema  LYMPH: No lymphadenopathy noted  SKIN: No rashes or lesions    LABS:                        12.5   15.2  )-----------( 273      ( 15 Dec 2017 08:02 )             40.1     12-15    137  |  104  |  21  ----------------------------<  371<H>  4.3   |  22  |  0.86    Ca    8.0<L>      15 Dec 2017 08:02  Phos  2.9     12-15  Mg     2.3     12-15    TPro  6.5  /  Alb  2.9<L>  /  TBili  0.4  /  DBili  x   /  AST  12<L>  /  ALT  37  /  AlkPhos  113  12-15        CAPILLARY BLOOD GLUCOSE      POCT Blood Glucose.: 395 mg/dL (15 Dec 2017 16:14)  POCT Blood Glucose.: 300 mg/dL (15 Dec 2017 11:21)  POCT Blood Glucose.: 348 mg/dL (15 Dec 2017 07:15)  POCT Blood Glucose.: 390 mg/dL (14 Dec 2017 21:23)      12-13 @ 20:39   No growth to date.  --  --  12-13 @ 18:15   No growth to date.  --  --  12-13 @ 18:06   Culture grew 3 or more types of organisms which indicate  collection contamination; consider recollection only if clinically  indicated.  --  --          MEDICATIONS  (STANDING):  ALBUTerol    90 MICROgram(s) HFA Inhaler 1 Puff(s) Inhalation every 4 hours  ALBUTerol/ipratropium for Nebulization 3 milliLiter(s) Nebulizer every 6 hours  aspirin enteric coated 81 milliGRAM(s) Oral daily  azithromycin  IVPB 500 milliGRAM(s) IV Intermittent every 24 hours  azithromycin  IVPB      calcium carbonate 500 mG (Tums) Chewable 1 Tablet(s) Chew daily  carvedilol 25 milliGRAM(s) Oral every 12 hours  cefTRIAXone   IVPB 1 Gram(s) IV Intermittent every 24 hours  dextrose 5%. 1000 milliLiter(s) (50 mL/Hr) IV Continuous <Continuous>  dextrose 50% Injectable 12.5 Gram(s) IV Push once  dextrose 50% Injectable 25 Gram(s) IV Push once  dextrose 50% Injectable 25 Gram(s) IV Push once  diltiazem    milliGRAM(s) Oral daily  enoxaparin Injectable 40 milliGRAM(s) SubCutaneous daily  folic acid 4 milliGRAM(s) Oral daily  insulin glargine Injectable (LANTUS) 30 Unit(s) SubCutaneous every morning  insulin lispro (HumaLOG) corrective regimen sliding scale   SubCutaneous Before meals and at bedtime  insulin lispro Injectable (HumaLOG) 7 Unit(s) SubCutaneous three times a day before meals  lisinopril 20 milliGRAM(s) Oral daily  loratadine 10 milliGRAM(s) Oral daily  mometasone 220 MICROgram(s) Inhaler 1 Puff(s) Inhalation daily  montelukast 10 milliGRAM(s) Oral daily  oseltamivir 75 milliGRAM(s) Oral daily  pantoprazole    Tablet 40 milliGRAM(s) Oral before breakfast  predniSONE   Tablet 60 milliGRAM(s) Oral every 12 hours  simvastatin 20 milliGRAM(s) Oral at bedtime  tiotropium 18 MICROgram(s) Capsule 1 Capsule(s) Inhalation daily    MEDICATIONS  (PRN):  dextrose Gel 1 Dose(s) Oral once PRN Blood Glucose LESS THAN 70 milliGRAM(s)/deciliter  furosemide    Tablet 40 milliGRAM(s) Oral every other day PRN edema  glucagon  Injectable 1 milliGRAM(s) IntraMuscular once PRN Glucose LESS THAN 70 milligrams/deciliter  HYDROmorphone  Injectable 0.5 milliGRAM(s) IV Push every 3 hours PRN Severe Pain (7 - 10)  ketorolac   Injectable 30 milliGRAM(s) IV Push every 8 hours PRN Moderate Pain (4 - 6)  metolazone 5 milliGRAM(s) Oral every 24 hours PRN water retention Patient is a 47y old  Female who presents with a chief complaint of Fever , neck pain (14 Dec 2017 13:53)      INTERVAL HPI: Pt seen and examined. States she is still sob and has slight rash with antibiotics but would like to continue.     OVERNIGHT EVENTS: none noted  T(F): 98.1 (12-15-17 @ 13:35), Max: 98.4 (12-14-17 @ 21:57)  HR: 91 (12-15-17 @ 16:05) (78 - 619)  BP: 114/69 (12-15-17 @ 13:35) (99/64 - 119/76)  RR: 16 (12-15-17 @ 13:35) (14 - 19)  SpO2: 97% (12-15-17 @ 16:05) (90% - 97%)  I&O's Summary    15 Dec 2017 07:01  -  15 Dec 2017 19:19  --------------------------------------------------------  IN: 480 mL / OUT: 0 mL / NET: 480 mL        REVIEW OF SYSTEMS: 12 systems reviewed noncontributory other than that stated in hpi    PHYSICAL EXAM:  GENERAL: obese, acute ill appearing  HEAD:  Atraumatic, Normocephalic  EYES: EOMI, PERRLA, conjunctiva and sclera clear  ENMT: No tonsillar erythema, exudates, or enlargement; Moist mucous membranes, Good dentition, No lesions  NERVOUS SYSTEM:  Alert & Oriented X3, Good concentration; Motor Strength 5/5 B/L upper and lower extremities; DTRs 2+ intact and symmetric  CHEST/LUNG: moderate wheeze b/l   HEART: Regular rate and rhythm; No murmurs, rubs, or gallops  ABDOMEN: Soft, Nontender, Nondistended; Bowel sounds present  EXTREMITIES:  2+ Peripheral Pulses, No clubbing, cyanosis, or edema  SKIN: No rashes or lesions    LABS:                        12.5   15.2  )-----------( 273      ( 15 Dec 2017 08:02 )             40.1     12-15    137  |  104  |  21  ----------------------------<  371<H>  4.3   |  22  |  0.86    Ca    8.0<L>      15 Dec 2017 08:02  Phos  2.9     12-15  Mg     2.3     12-15    TPro  6.5  /  Alb  2.9<L>  /  TBili  0.4  /  DBili  x   /  AST  12<L>  /  ALT  37  /  AlkPhos  113  12-15        CAPILLARY BLOOD GLUCOSE      POCT Blood Glucose.: 395 mg/dL (15 Dec 2017 16:14)  POCT Blood Glucose.: 300 mg/dL (15 Dec 2017 11:21)  POCT Blood Glucose.: 348 mg/dL (15 Dec 2017 07:15)  POCT Blood Glucose.: 390 mg/dL (14 Dec 2017 21:23)      12-13 @ 20:39   No growth to date.  --  --  12-13 @ 18:15   No growth to date.  --  --  12-13 @ 18:06   Culture grew 3 or more types of organisms which indicate  collection contamination; consider recollection only if clinically  indicated.  --  --          MEDICATIONS  (STANDING):  ALBUTerol    90 MICROgram(s) HFA Inhaler 1 Puff(s) Inhalation every 4 hours  ALBUTerol/ipratropium for Nebulization 3 milliLiter(s) Nebulizer every 6 hours  aspirin enteric coated 81 milliGRAM(s) Oral daily  azithromycin  IVPB 500 milliGRAM(s) IV Intermittent every 24 hours  azithromycin  IVPB      calcium carbonate 500 mG (Tums) Chewable 1 Tablet(s) Chew daily  carvedilol 25 milliGRAM(s) Oral every 12 hours  cefTRIAXone   IVPB 1 Gram(s) IV Intermittent every 24 hours  dextrose 5%. 1000 milliLiter(s) (50 mL/Hr) IV Continuous <Continuous>  dextrose 50% Injectable 12.5 Gram(s) IV Push once  dextrose 50% Injectable 25 Gram(s) IV Push once  dextrose 50% Injectable 25 Gram(s) IV Push once  diltiazem    milliGRAM(s) Oral daily  enoxaparin Injectable 40 milliGRAM(s) SubCutaneous daily  folic acid 4 milliGRAM(s) Oral daily  insulin glargine Injectable (LANTUS) 30 Unit(s) SubCutaneous every morning  insulin lispro (HumaLOG) corrective regimen sliding scale   SubCutaneous Before meals and at bedtime  insulin lispro Injectable (HumaLOG) 7 Unit(s) SubCutaneous three times a day before meals  lisinopril 20 milliGRAM(s) Oral daily  loratadine 10 milliGRAM(s) Oral daily  mometasone 220 MICROgram(s) Inhaler 1 Puff(s) Inhalation daily  montelukast 10 milliGRAM(s) Oral daily  oseltamivir 75 milliGRAM(s) Oral daily  pantoprazole    Tablet 40 milliGRAM(s) Oral before breakfast  predniSONE   Tablet 60 milliGRAM(s) Oral every 12 hours  simvastatin 20 milliGRAM(s) Oral at bedtime  tiotropium 18 MICROgram(s) Capsule 1 Capsule(s) Inhalation daily    MEDICATIONS  (PRN):  dextrose Gel 1 Dose(s) Oral once PRN Blood Glucose LESS THAN 70 milliGRAM(s)/deciliter  furosemide    Tablet 40 milliGRAM(s) Oral every other day PRN edema  glucagon  Injectable 1 milliGRAM(s) IntraMuscular once PRN Glucose LESS THAN 70 milligrams/deciliter  HYDROmorphone  Injectable 0.5 milliGRAM(s) IV Push every 3 hours PRN Severe Pain (7 - 10)  ketorolac   Injectable 30 milliGRAM(s) IV Push every 8 hours PRN Moderate Pain (4 - 6)  metolazone 5 milliGRAM(s) Oral every 24 hours PRN water retention

## 2017-12-15 NOTE — PROGRESS NOTE ADULT - SUBJECTIVE AND OBJECTIVE BOX
Patient is a 47y old  Female who presents with a chief complaint of Fever , neck pain (14 Dec 2017 13:53)      INTERVAL HPI/OVERNIGHT EVENTS:    Complains of cough, shortness of breath, and wheezing    MEDICATIONS  (STANDING):  ALBUTerol    90 MICROgram(s) HFA Inhaler 1 Puff(s) Inhalation every 4 hours  ALBUTerol/ipratropium for Nebulization 3 milliLiter(s) Nebulizer every 6 hours  aspirin enteric coated 81 milliGRAM(s) Oral daily  azithromycin  IVPB 500 milliGRAM(s) IV Intermittent every 24 hours  azithromycin  IVPB      calcium carbonate 500 mG (Tums) Chewable 1 Tablet(s) Chew daily  carvedilol 25 milliGRAM(s) Oral every 12 hours  cefTRIAXone   IVPB 1 Gram(s) IV Intermittent every 24 hours  dextrose 5%. 1000 milliLiter(s) (50 mL/Hr) IV Continuous <Continuous>  dextrose 50% Injectable 12.5 Gram(s) IV Push once  dextrose 50% Injectable 25 Gram(s) IV Push once  dextrose 50% Injectable 25 Gram(s) IV Push once  diltiazem    milliGRAM(s) Oral daily  enoxaparin Injectable 40 milliGRAM(s) SubCutaneous daily  folic acid 4 milliGRAM(s) Oral daily  insulin glargine Injectable (LANTUS) 30 Unit(s) SubCutaneous every morning  insulin lispro (HumaLOG) corrective regimen sliding scale   SubCutaneous Before meals and at bedtime  insulin lispro Injectable (HumaLOG) 7 Unit(s) SubCutaneous three times a day before meals  lisinopril 20 milliGRAM(s) Oral daily  loratadine 10 milliGRAM(s) Oral daily  mometasone 220 MICROgram(s) Inhaler 1 Puff(s) Inhalation daily  montelukast 10 milliGRAM(s) Oral daily  oseltamivir 75 milliGRAM(s) Oral daily  pantoprazole    Tablet 40 milliGRAM(s) Oral before breakfast  predniSONE   Tablet 60 milliGRAM(s) Oral every 12 hours  simvastatin 20 milliGRAM(s) Oral at bedtime  tiotropium 18 MICROgram(s) Capsule 1 Capsule(s) Inhalation daily      MEDICATIONS  (PRN):  dextrose Gel 1 Dose(s) Oral once PRN Blood Glucose LESS THAN 70 milliGRAM(s)/deciliter  furosemide    Tablet 40 milliGRAM(s) Oral every other day PRN edema  glucagon  Injectable 1 milliGRAM(s) IntraMuscular once PRN Glucose LESS THAN 70 milligrams/deciliter  HYDROmorphone  Injectable 0.5 milliGRAM(s) IV Push every 3 hours PRN Severe Pain (7 - 10)  ketorolac   Injectable 30 milliGRAM(s) IV Push every 8 hours PRN Moderate Pain (4 - 6)  metolazone 5 milliGRAM(s) Oral every 24 hours PRN water retention      Allergies    Depakote (Unknown)  Diprivan (Nausea)  gadolinium containing compounds (Angioedema)  latex (Unknown)  levofloxacin (Anaphylaxis (Mod to Severe))  nutrasweet (Unknown)  Ofirmev (Anaphylaxis)  propofol (Unknown)    Intolerances        PAST MEDICAL & SURGICAL HISTORY:  Essential hypertension, hypertension with unspecified goal  Diabetes mellitus type 2 in obese  Migraine  MTHFR (methylene THF reductase) deficiency and homocystinuria  Polycystic disease, ovaries  CVA (cerebral vascular accident)  GERD (gastroesophageal reflux disease)  Obesity  Prinzmetal Angina  Asthma: intubated in the past  H/O ovarian cystectomy  Salpingo-oophoritis: oophrectomy  History of tonsillectomy  History of Cholecystectomy  Status Post Nissen Fundoplication (with Gastrostomy Tube Placement)      Vital Signs Last 24 Hrs  T(C): 36.7 (15 Dec 2017 13:35), Max: 36.9 (14 Dec 2017 21:57)  T(F): 98.1 (15 Dec 2017 13:35), Max: 98.4 (14 Dec 2017 21:57)  HR: 91 (15 Dec 2017 16:05) (78 - 619)  BP: 114/69 (15 Dec 2017 13:35) (99/64 - 119/76)  BP(mean): --  RR: 16 (15 Dec 2017 13:35) (14 - 19)  SpO2: 97% (15 Dec 2017 16:05) (90% - 97%)    PHYSICAL EXAMINATION:    GENERAL: The patient is awake and alert in no apparent distress.     HEENT: Head is normocephalic and atraumatic. Extraocular muscles are intact. Mucous membranes are moist.    NECK: Supple.    LUNGS: Bilateral expiratory wheezes    HEART: Regular rate and rhythm without murmur.    ABDOMEN: Soft, nontender, and nondistended.      EXTREMITIES: Without any cyanosis, clubbing, rash, lesions or edema.    NEUROLOGIC: Grossly intact.    SKIN: No ulceration or induration present.      LABS:                        12.5   15.2  )-----------( 273      ( 15 Dec 2017 08:02 )             40.1     12-15    137  |  104  |  21  ----------------------------<  371<H>  4.3   |  22  |  0.86    Ca    8.0<L>      15 Dec 2017 08:02  Phos  2.9     12-15  Mg     2.3     12-15    TPro  6.5  /  Alb  2.9<L>  /  TBili  0.4  /  DBili  x   /  AST  12<L>  /  ALT  37  /  AlkPhos  113  12-15                    Procalcitonin, Serum: <0.05 ng/mL (12-15-17 @ 08:02)  Procalcitonin, Serum: <0.05 (12-14-17 @ 05:32)      MICROBIOLOGY:  Culture Results:   No growth to date. (12-13 @ 20:39)  Culture Results:   Testing in progress (12-13 @ 20:39)  Culture Results:   No growth to date. (12-13 @ 18:15)  Culture Results:   No growth to date. (12-13 @ 18:15)  Culture Results:   Culture grew 3 or more types of organisms which indicate  collection contamination; consider recollection only if clinically  indicated. (12-13 @ 18:06)      RADIOLOGY & ADDITIONAL STUDIES:    Assessment:    Chronic Persistent Asthma with exacerbation  Anxiety Disorder  Hx Diabetes    Plan:    Continue steroids + nebulizer treatments + Spiriva + antibiotics

## 2017-12-16 LAB
ANION GAP SERPL CALC-SCNC: 8 MMOL/L — SIGNIFICANT CHANGE UP (ref 5–17)
BASOPHILS # BLD AUTO: 0 K/UL — SIGNIFICANT CHANGE UP (ref 0–0.2)
BASOPHILS NFR BLD AUTO: 0.2 % — SIGNIFICANT CHANGE UP (ref 0–2)
BUN SERPL-MCNC: 22 MG/DL — SIGNIFICANT CHANGE UP (ref 7–23)
CALCIUM SERPL-MCNC: 8.5 MG/DL — SIGNIFICANT CHANGE UP (ref 8.5–10.1)
CHLORIDE SERPL-SCNC: 103 MMOL/L — SIGNIFICANT CHANGE UP (ref 96–108)
CO2 SERPL-SCNC: 26 MMOL/L — SIGNIFICANT CHANGE UP (ref 22–31)
CREAT SERPL-MCNC: 0.73 MG/DL — SIGNIFICANT CHANGE UP (ref 0.5–1.3)
CULTURE RESULTS: NO GROWTH — SIGNIFICANT CHANGE UP
EOSINOPHIL # BLD AUTO: 0 K/UL — SIGNIFICANT CHANGE UP (ref 0–0.5)
EOSINOPHIL NFR BLD AUTO: 0.1 % — SIGNIFICANT CHANGE UP (ref 0–6)
GLUCOSE SERPL-MCNC: 324 MG/DL — HIGH (ref 70–99)
HCT VFR BLD CALC: 38.8 % — SIGNIFICANT CHANGE UP (ref 34.5–45)
HGB BLD-MCNC: 12.3 G/DL — SIGNIFICANT CHANGE UP (ref 11.5–15.5)
LYMPHOCYTES # BLD AUTO: 1.3 K/UL — SIGNIFICANT CHANGE UP (ref 1–3.3)
LYMPHOCYTES # BLD AUTO: 11.2 % — LOW (ref 13–44)
MCHC RBC-ENTMCNC: 27.2 PG — SIGNIFICANT CHANGE UP (ref 27–34)
MCHC RBC-ENTMCNC: 31.7 GM/DL — LOW (ref 32–36)
MCV RBC AUTO: 85.6 FL — SIGNIFICANT CHANGE UP (ref 80–100)
MONOCYTES # BLD AUTO: 0.6 K/UL — SIGNIFICANT CHANGE UP (ref 0–0.9)
MONOCYTES NFR BLD AUTO: 4.8 % — SIGNIFICANT CHANGE UP (ref 1–9)
NEUTROPHILS # BLD AUTO: 9.8 K/UL — HIGH (ref 1.8–7.4)
NEUTROPHILS NFR BLD AUTO: 83.8 % — HIGH (ref 43–77)
PLATELET # BLD AUTO: 247 K/UL — SIGNIFICANT CHANGE UP (ref 150–400)
POTASSIUM SERPL-MCNC: 5.2 MMOL/L — SIGNIFICANT CHANGE UP (ref 3.5–5.3)
POTASSIUM SERPL-SCNC: 5.2 MMOL/L — SIGNIFICANT CHANGE UP (ref 3.5–5.3)
RAPID RVP RESULT: SIGNIFICANT CHANGE UP
RBC # BLD: 4.53 M/UL — SIGNIFICANT CHANGE UP (ref 3.8–5.2)
RBC # FLD: 13.2 % — SIGNIFICANT CHANGE UP (ref 10.3–14.5)
SODIUM SERPL-SCNC: 137 MMOL/L — SIGNIFICANT CHANGE UP (ref 135–145)
SPECIMEN SOURCE: SIGNIFICANT CHANGE UP
WBC # BLD: 11.7 K/UL — HIGH (ref 3.8–10.5)
WBC # FLD AUTO: 11.7 K/UL — HIGH (ref 3.8–10.5)

## 2017-12-16 PROCEDURE — 99233 SBSQ HOSP IP/OBS HIGH 50: CPT

## 2017-12-16 PROCEDURE — 99255 IP/OBS CONSLTJ NEW/EST HI 80: CPT

## 2017-12-16 RX ORDER — INSULIN GLARGINE 100 [IU]/ML
30 INJECTION, SOLUTION SUBCUTANEOUS
Qty: 0 | Refills: 0 | Status: DISCONTINUED | OUTPATIENT
Start: 2017-12-16 | End: 2017-12-17

## 2017-12-16 RX ORDER — DIPHENHYDRAMINE HCL 50 MG
50 CAPSULE ORAL ONCE
Qty: 0 | Refills: 0 | Status: COMPLETED | OUTPATIENT
Start: 2017-12-16 | End: 2017-12-16

## 2017-12-16 RX ORDER — DIPHENHYDRAMINE HCL 50 MG
50 CAPSULE ORAL DAILY
Qty: 0 | Refills: 0 | Status: DISCONTINUED | OUTPATIENT
Start: 2017-12-16 | End: 2017-12-17

## 2017-12-16 RX ORDER — INSULIN LISPRO 100/ML
VIAL (ML) SUBCUTANEOUS AT BEDTIME
Qty: 0 | Refills: 0 | Status: DISCONTINUED | OUTPATIENT
Start: 2017-12-16 | End: 2017-12-17

## 2017-12-16 RX ORDER — INSULIN LISPRO 100/ML
VIAL (ML) SUBCUTANEOUS
Qty: 0 | Refills: 0 | Status: DISCONTINUED | OUTPATIENT
Start: 2017-12-16 | End: 2017-12-17

## 2017-12-16 RX ORDER — DIPHENHYDRAMINE HCL 50 MG
50 CAPSULE ORAL ONCE
Qty: 0 | Refills: 0 | Status: DISCONTINUED | OUTPATIENT
Start: 2017-12-16 | End: 2017-12-16

## 2017-12-16 RX ORDER — INSULIN LISPRO 100/ML
10 VIAL (ML) SUBCUTANEOUS
Qty: 0 | Refills: 0 | Status: DISCONTINUED | OUTPATIENT
Start: 2017-12-16 | End: 2017-12-17

## 2017-12-16 RX ADMIN — AZITHROMYCIN 255 MILLIGRAM(S): 500 TABLET, FILM COATED ORAL at 10:10

## 2017-12-16 RX ADMIN — Medication 10 UNIT(S): at 17:38

## 2017-12-16 RX ADMIN — Medication 7 UNIT(S): at 12:42

## 2017-12-16 RX ADMIN — LISINOPRIL 20 MILLIGRAM(S): 2.5 TABLET ORAL at 05:09

## 2017-12-16 RX ADMIN — Medication 8: at 09:04

## 2017-12-16 RX ADMIN — Medication 50 MILLIGRAM(S): at 10:10

## 2017-12-16 RX ADMIN — HYDROMORPHONE HYDROCHLORIDE 0.5 MILLIGRAM(S): 2 INJECTION INTRAMUSCULAR; INTRAVENOUS; SUBCUTANEOUS at 14:59

## 2017-12-16 RX ADMIN — Medication 60 MILLIGRAM(S): at 17:32

## 2017-12-16 RX ADMIN — Medication 81 MILLIGRAM(S): at 12:18

## 2017-12-16 RX ADMIN — Medication 75 MILLIGRAM(S): at 12:11

## 2017-12-16 RX ADMIN — Medication 7 UNIT(S): at 09:05

## 2017-12-16 RX ADMIN — CEFTRIAXONE 100 GRAM(S): 500 INJECTION, POWDER, FOR SOLUTION INTRAMUSCULAR; INTRAVENOUS at 13:38

## 2017-12-16 RX ADMIN — HYDROMORPHONE HYDROCHLORIDE 0.5 MILLIGRAM(S): 2 INJECTION INTRAMUSCULAR; INTRAVENOUS; SUBCUTANEOUS at 10:35

## 2017-12-16 RX ADMIN — HYDROMORPHONE HYDROCHLORIDE 0.5 MILLIGRAM(S): 2 INJECTION INTRAMUSCULAR; INTRAVENOUS; SUBCUTANEOUS at 21:18

## 2017-12-16 RX ADMIN — MONTELUKAST 10 MILLIGRAM(S): 4 TABLET, CHEWABLE ORAL at 12:20

## 2017-12-16 RX ADMIN — Medication 300 MILLIGRAM(S): at 05:08

## 2017-12-16 RX ADMIN — Medication 30 MILLIGRAM(S): at 18:20

## 2017-12-16 RX ADMIN — HYDROMORPHONE HYDROCHLORIDE 0.5 MILLIGRAM(S): 2 INJECTION INTRAMUSCULAR; INTRAVENOUS; SUBCUTANEOUS at 21:55

## 2017-12-16 RX ADMIN — Medication 3 MILLILITER(S): at 20:46

## 2017-12-16 RX ADMIN — HYDROMORPHONE HYDROCHLORIDE 0.5 MILLIGRAM(S): 2 INJECTION INTRAMUSCULAR; INTRAVENOUS; SUBCUTANEOUS at 11:00

## 2017-12-16 RX ADMIN — MOMETASONE FUROATE 1 PUFF(S): 220 INHALANT RESPIRATORY (INHALATION) at 05:09

## 2017-12-16 RX ADMIN — HYDROMORPHONE HYDROCHLORIDE 0.5 MILLIGRAM(S): 2 INJECTION INTRAMUSCULAR; INTRAVENOUS; SUBCUTANEOUS at 05:30

## 2017-12-16 RX ADMIN — PANTOPRAZOLE SODIUM 40 MILLIGRAM(S): 20 TABLET, DELAYED RELEASE ORAL at 05:09

## 2017-12-16 RX ADMIN — Medication 1 TABLET(S): at 12:27

## 2017-12-16 RX ADMIN — Medication 3 MILLILITER(S): at 08:24

## 2017-12-16 RX ADMIN — Medication 3 MILLILITER(S): at 13:54

## 2017-12-16 RX ADMIN — HYDROMORPHONE HYDROCHLORIDE 0.5 MILLIGRAM(S): 2 INJECTION INTRAMUSCULAR; INTRAVENOUS; SUBCUTANEOUS at 00:15

## 2017-12-16 RX ADMIN — Medication 30 MILLIGRAM(S): at 17:57

## 2017-12-16 RX ADMIN — CARVEDILOL PHOSPHATE 25 MILLIGRAM(S): 80 CAPSULE, EXTENDED RELEASE ORAL at 17:29

## 2017-12-16 RX ADMIN — INSULIN GLARGINE 30 UNIT(S): 100 INJECTION, SOLUTION SUBCUTANEOUS at 09:06

## 2017-12-16 RX ADMIN — Medication 4 MILLIGRAM(S): at 12:19

## 2017-12-16 RX ADMIN — HYDROMORPHONE HYDROCHLORIDE 0.5 MILLIGRAM(S): 2 INJECTION INTRAMUSCULAR; INTRAVENOUS; SUBCUTANEOUS at 14:31

## 2017-12-16 RX ADMIN — CARVEDILOL PHOSPHATE 25 MILLIGRAM(S): 80 CAPSULE, EXTENDED RELEASE ORAL at 05:08

## 2017-12-16 RX ADMIN — Medication: at 12:42

## 2017-12-16 RX ADMIN — ENOXAPARIN SODIUM 40 MILLIGRAM(S): 100 INJECTION SUBCUTANEOUS at 12:21

## 2017-12-16 RX ADMIN — INSULIN GLARGINE 30 UNIT(S): 100 INJECTION, SOLUTION SUBCUTANEOUS at 21:20

## 2017-12-16 RX ADMIN — SIMVASTATIN 20 MILLIGRAM(S): 20 TABLET, FILM COATED ORAL at 21:17

## 2017-12-16 RX ADMIN — Medication 4: at 21:19

## 2017-12-16 RX ADMIN — Medication 12: at 17:37

## 2017-12-16 RX ADMIN — Medication 60 MILLIGRAM(S): at 05:08

## 2017-12-16 RX ADMIN — Medication 3 MILLILITER(S): at 02:06

## 2017-12-16 RX ADMIN — HYDROMORPHONE HYDROCHLORIDE 0.5 MILLIGRAM(S): 2 INJECTION INTRAMUSCULAR; INTRAVENOUS; SUBCUTANEOUS at 05:08

## 2017-12-16 RX ADMIN — LORATADINE 10 MILLIGRAM(S): 10 TABLET ORAL at 12:11

## 2017-12-16 NOTE — PROGRESS NOTE ADULT - SUBJECTIVE AND OBJECTIVE BOX
INTERVAL HPI/OVERNIGHT EVENTS: Patient seen and examined at bedside. No acute events overnight. Still has wheezing on exam and claims she get itchy prior to azithromycin which improves with benadryl     MEDICATIONS  (STANDING):  ALBUTerol    90 MICROgram(s) HFA Inhaler 1 Puff(s) Inhalation every 4 hours  ALBUTerol/ipratropium for Nebulization 3 milliLiter(s) Nebulizer every 6 hours  aspirin enteric coated 81 milliGRAM(s) Oral daily  azithromycin  IVPB 500 milliGRAM(s) IV Intermittent every 24 hours  azithromycin  IVPB      calcium carbonate 500 mG (Tums) Chewable 1 Tablet(s) Chew daily  carvedilol 25 milliGRAM(s) Oral every 12 hours  cefTRIAXone   IVPB 1 Gram(s) IV Intermittent every 24 hours  dextrose 5%. 1000 milliLiter(s) (50 mL/Hr) IV Continuous <Continuous>  dextrose 50% Injectable 12.5 Gram(s) IV Push once  dextrose 50% Injectable 25 Gram(s) IV Push once  dextrose 50% Injectable 25 Gram(s) IV Push once  diltiazem    milliGRAM(s) Oral daily  enoxaparin Injectable 40 milliGRAM(s) SubCutaneous daily  folic acid 4 milliGRAM(s) Oral daily  insulin glargine Injectable (LANTUS) 30 Unit(s) SubCutaneous two times a day  insulin lispro (HumaLOG) corrective regimen sliding scale   SubCutaneous at bedtime  insulin lispro (HumaLOG) corrective regimen sliding scale   SubCutaneous three times a day before meals  insulin lispro Injectable (HumaLOG) 10 Unit(s) SubCutaneous three times a day before meals  lisinopril 20 milliGRAM(s) Oral daily  loratadine 10 milliGRAM(s) Oral daily  mometasone 220 MICROgram(s) Inhaler 1 Puff(s) Inhalation daily  montelukast 10 milliGRAM(s) Oral daily  oseltamivir 75 milliGRAM(s) Oral daily  pantoprazole    Tablet 40 milliGRAM(s) Oral before breakfast  predniSONE   Tablet 60 milliGRAM(s) Oral every 12 hours  simvastatin 20 milliGRAM(s) Oral at bedtime  tiotropium 18 MICROgram(s) Capsule 1 Capsule(s) Inhalation daily    MEDICATIONS  (PRN):  dextrose Gel 1 Dose(s) Oral once PRN Blood Glucose LESS THAN 70 milliGRAM(s)/deciliter  furosemide    Tablet 40 milliGRAM(s) Oral every other day PRN edema  glucagon  Injectable 1 milliGRAM(s) IntraMuscular once PRN Glucose LESS THAN 70 milligrams/deciliter  HYDROmorphone  Injectable 0.5 milliGRAM(s) IV Push every 3 hours PRN Severe Pain (7 - 10)  ketorolac   Injectable 30 milliGRAM(s) IV Push every 8 hours PRN Moderate Pain (4 - 6)  metolazone 5 milliGRAM(s) Oral every 24 hours PRN water retention      Allergies    Depakote (Unknown)  Diprivan (Nausea)  gadolinium containing compounds (Angioedema)  latex (Unknown)  levofloxacin (Anaphylaxis (Mod to Severe))  nutrasweet (Unknown)  Ofirmev (Anaphylaxis)  propofol (Unknown)    Intolerances        CONSTITUTIONAL: No weakness, fevers or chills  RESPIRATORY: + cough, +wheezing, No hemoptysis; +shortness of breath  Cvs: No chest pain or palpitations  Gi: No abdominal pain. No nausea, vomiting, diarrhea or constipation. No melena or hematochezia.  Neuro: + weakness    All other review of systems is negative unless indicated above.    Vital Signs Last 24 Hrs  T(C): 36.9 (16 Dec 2017 14:09), Max: 36.9 (15 Dec 2017 21:39)  T(F): 98.4 (16 Dec 2017 14:09), Max: 98.4 (15 Dec 2017 21:39)  HR: 92 (16 Dec 2017 14:09) (76 - 94)  BP: 142/74 (16 Dec 2017 14:09) (112/66 - 142/79)  BP(mean): --  RR: 16 (16 Dec 2017 14:09) (16 - 18)  SpO2: 92% (16 Dec 2017 14:09) (89% - 96%)    General: NAD  Neurology: A&Ox 3, nonfocal  Respiratory: +wheezing   CV: RRR, S1S2  Abdominal: Soft, NT, ND +BS  Extremities: No edema, + peripheral pulses      LABS:                        12.3   11.7  )-----------( 247      ( 16 Dec 2017 09:23 )             38.8     12-16    137  |  103  |  22  ----------------------------<  324<H>  5.2   |  26  |  0.73    Ca    8.5      16 Dec 2017 09:23  Phos  2.9     12-15  Mg     2.3     12-15    TPro  6.5  /  Alb  2.9<L>  /  TBili  0.4  /  DBili  x   /  AST  12<L>  /  ALT  37  /  AlkPhos  113  12-15          RADIOLOGY & ADDITIONAL TESTS:

## 2017-12-16 NOTE — PROGRESS NOTE ADULT - ASSESSMENT
Patient is a 47 year old female with PMH of asthma, right heart failure, diabetes, Essential HTN, MTHFR deficiency and homocysteinuria, Prinzmetal angina presents complaining of neck pain that has been constant for the past week, admitted with asthma exacerbation + Enterovirus suspect also PNA

## 2017-12-16 NOTE — CONSULT NOTE ADULT - ASSESSMENT
47 year old female with PMH of asthma, w hx of 21 intubations,  right heart failure, diabetes, Essential HTN, MTHFR deficiency and homocysteinuria, Prinzmetal angina presents w PNA   - Her chest pain is likely not related to her Prinzmetal angina but more to her current PNA.  Check 12 lead EKG. no need to trend cherry  - Appears compensated overall from HF POV. Hold on diuretics for today  - Continue her Coreg 25mg q12 and Dilt cd 300mg Qday  - cont lisinopril   - Monitor and replete electrolytes. Keep K>4.0 and Mg>2.0.   - Cont remote tele for 24 hours more.   - Rx for the flu  - Further cardiac workup will depend on clinical course.   - All other workup per primary team. Will followup.

## 2017-12-16 NOTE — CONSULT NOTE ADULT - PROBLEM SELECTOR RECOMMENDATION 9
increase humalog 12 units tid before meals  change high dose humalog scale coverage qac  cont lantus 30 units qam  goal 100-180; ada diet

## 2017-12-16 NOTE — PROGRESS NOTE ADULT - SUBJECTIVE AND OBJECTIVE BOX
Patient is a 47y old  Female who presents with a chief complaint of Fever , neck pain (14 Dec 2017 13:53)      INTERVAL HPI/OVERNIGHT EVENTS:    Cough and shortness of breath have improved. Placed in isolation as her roommate tested positive for flu    MEDICATIONS  (STANDING):  ALBUTerol    90 MICROgram(s) HFA Inhaler 1 Puff(s) Inhalation every 4 hours  ALBUTerol/ipratropium for Nebulization 3 milliLiter(s) Nebulizer every 6 hours  aspirin enteric coated 81 milliGRAM(s) Oral daily  azithromycin  IVPB 500 milliGRAM(s) IV Intermittent every 24 hours  azithromycin  IVPB      calcium carbonate 500 mG (Tums) Chewable 1 Tablet(s) Chew daily  carvedilol 25 milliGRAM(s) Oral every 12 hours  cefTRIAXone   IVPB 1 Gram(s) IV Intermittent every 24 hours  dextrose 5%. 1000 milliLiter(s) (50 mL/Hr) IV Continuous <Continuous>  dextrose 50% Injectable 12.5 Gram(s) IV Push once  dextrose 50% Injectable 25 Gram(s) IV Push once  dextrose 50% Injectable 25 Gram(s) IV Push once  diltiazem    milliGRAM(s) Oral daily  enoxaparin Injectable 40 milliGRAM(s) SubCutaneous daily  folic acid 4 milliGRAM(s) Oral daily  insulin glargine Injectable (LANTUS) 30 Unit(s) SubCutaneous two times a day  insulin lispro (HumaLOG) corrective regimen sliding scale   SubCutaneous at bedtime  insulin lispro (HumaLOG) corrective regimen sliding scale   SubCutaneous three times a day before meals  insulin lispro Injectable (HumaLOG) 10 Unit(s) SubCutaneous three times a day before meals  lisinopril 20 milliGRAM(s) Oral daily  loratadine 10 milliGRAM(s) Oral daily  mometasone 220 MICROgram(s) Inhaler 1 Puff(s) Inhalation daily  montelukast 10 milliGRAM(s) Oral daily  oseltamivir 75 milliGRAM(s) Oral daily  pantoprazole    Tablet 40 milliGRAM(s) Oral before breakfast  simvastatin 20 milliGRAM(s) Oral at bedtime  tiotropium 18 MICROgram(s) Capsule 1 Capsule(s) Inhalation daily      MEDICATIONS  (PRN):  dextrose Gel 1 Dose(s) Oral once PRN Blood Glucose LESS THAN 70 milliGRAM(s)/deciliter  diphenhydrAMINE   Injectable 50 milliGRAM(s) IV Push daily PRN Rash and/or Itching  furosemide    Tablet 40 milliGRAM(s) Oral every other day PRN edema  glucagon  Injectable 1 milliGRAM(s) IntraMuscular once PRN Glucose LESS THAN 70 milligrams/deciliter  HYDROmorphone  Injectable 0.5 milliGRAM(s) IV Push every 3 hours PRN Severe Pain (7 - 10)  ketorolac   Injectable 30 milliGRAM(s) IV Push every 8 hours PRN Moderate Pain (4 - 6)  metolazone 5 milliGRAM(s) Oral every 24 hours PRN water retention      Allergies    Depakote (Unknown)  Diprivan (Nausea)  gadolinium containing compounds (Angioedema)  latex (Unknown)  levofloxacin (Anaphylaxis (Mod to Severe))  nutrasweet (Unknown)  Ofirmev (Anaphylaxis)  propofol (Unknown)    Intolerances        PAST MEDICAL & SURGICAL HISTORY:  Essential hypertension, hypertension with unspecified goal  Diabetes mellitus type 2 in obese  Migraine  MTHFR (methylene THF reductase) deficiency and homocystinuria  Polycystic disease, ovaries  CVA (cerebral vascular accident)  GERD (gastroesophageal reflux disease)  Obesity  Prinzmetal Angina  Asthma: intubated in the past  H/O ovarian cystectomy  Salpingo-oophoritis: oophrectomy  History of tonsillectomy  History of Cholecystectomy  Status Post Nissen Fundoplication (with Gastrostomy Tube Placement)      Vital Signs Last 24 Hrs  T(C): 36.9 (16 Dec 2017 14:09), Max: 36.9 (15 Dec 2017 21:39)  T(F): 98.4 (16 Dec 2017 14:09), Max: 98.4 (15 Dec 2017 21:39)  HR: 92 (16 Dec 2017 14:09) (76 - 94)  BP: 142/74 (16 Dec 2017 14:09) (112/66 - 142/79)  BP(mean): --  RR: 16 (16 Dec 2017 14:09) (16 - 18)  SpO2: 92% (16 Dec 2017 14:09) (89% - 96%)    PHYSICAL EXAMINATION:    GENERAL: The patient is awake and alert in no apparent distress.     HEENT: Head is normocephalic and atraumatic. Extraocular muscles are intact. Mucous membranes are moist.    NECK: Supple.    LUNGS: Mild expiratory wheezes    HEART: Regular rate and rhythm without murmur.    ABDOMEN: Soft, nontender, and nondistended.      EXTREMITIES: Without any cyanosis, clubbing, rash, lesions or edema.    NEUROLOGIC: Grossly intact.    SKIN: No ulceration or induration present.      LABS:                        12.3   11.7  )-----------( 247      ( 16 Dec 2017 09:23 )             38.8     12-16    137  |  103  |  22  ----------------------------<  324<H>  5.2   |  26  |  0.73    Ca    8.5      16 Dec 2017 09:23  Phos  2.9     12-15  Mg     2.3     12-15    TPro  6.5  /  Alb  2.9<L>  /  TBili  0.4  /  DBili  x   /  AST  12<L>  /  ALT  37  /  AlkPhos  113  12-15                    Procalcitonin, Serum: <0.05 ng/mL (12-15-17 @ 08:02)  Procalcitonin, Serum: <0.05 (12-14-17 @ 05:32)      MICROBIOLOGY:  Culture Results:   No growth (12-13 @ 20:39)  Culture Results:   Testing in progress (12-13 @ 20:39)  Culture Results:   No growth to date. (12-13 @ 18:15)  Culture Results:   No growth to date. (12-13 @ 18:15)  Culture Results:   Culture grew 3 or more types of organisms which indicate  collection contamination; consider recollection only if clinically  indicated. (12-13 @ 18:06)      RADIOLOGY & ADDITIONAL STUDIES:    Assessment:    Viral Syndrome  Asthma with exacerbation  Anxiety Disorder    Plan:    Continue antibiotics + Tamiflu + bronchodilators

## 2017-12-17 VITALS — OXYGEN SATURATION: 94 %

## 2017-12-17 LAB
HCT VFR BLD CALC: 40.1 % — SIGNIFICANT CHANGE UP (ref 34.5–45)
HGB BLD-MCNC: 12.7 G/DL — SIGNIFICANT CHANGE UP (ref 11.5–15.5)
MCHC RBC-ENTMCNC: 27.3 PG — SIGNIFICANT CHANGE UP (ref 27–34)
MCHC RBC-ENTMCNC: 31.6 GM/DL — LOW (ref 32–36)
MCV RBC AUTO: 86.2 FL — SIGNIFICANT CHANGE UP (ref 80–100)
PLATELET # BLD AUTO: 279 K/UL — SIGNIFICANT CHANGE UP (ref 150–400)
RBC # BLD: 4.66 M/UL — SIGNIFICANT CHANGE UP (ref 3.8–5.2)
RBC # FLD: 13.3 % — SIGNIFICANT CHANGE UP (ref 10.3–14.5)
WBC # BLD: 11.6 K/UL — HIGH (ref 3.8–10.5)
WBC # FLD AUTO: 11.6 K/UL — HIGH (ref 3.8–10.5)

## 2017-12-17 PROCEDURE — 80048 BASIC METABOLIC PNL TOTAL CA: CPT

## 2017-12-17 PROCEDURE — 96365 THER/PROPH/DIAG IV INF INIT: CPT

## 2017-12-17 PROCEDURE — 70450 CT HEAD/BRAIN W/O DYE: CPT

## 2017-12-17 PROCEDURE — 70491 CT SOFT TISSUE NECK W/DYE: CPT

## 2017-12-17 PROCEDURE — 83605 ASSAY OF LACTIC ACID: CPT

## 2017-12-17 PROCEDURE — 87581 M.PNEUMON DNA AMP PROBE: CPT

## 2017-12-17 PROCEDURE — 87633 RESP VIRUS 12-25 TARGETS: CPT

## 2017-12-17 PROCEDURE — 85730 THROMBOPLASTIN TIME PARTIAL: CPT

## 2017-12-17 PROCEDURE — 87205 SMEAR GRAM STAIN: CPT

## 2017-12-17 PROCEDURE — 84100 ASSAY OF PHOSPHORUS: CPT

## 2017-12-17 PROCEDURE — 80053 COMPREHEN METABOLIC PANEL: CPT

## 2017-12-17 PROCEDURE — 62270 DX LMBR SPI PNXR: CPT

## 2017-12-17 PROCEDURE — 96375 TX/PRO/DX INJ NEW DRUG ADDON: CPT

## 2017-12-17 PROCEDURE — 96367 TX/PROPH/DG ADDL SEQ IV INF: CPT

## 2017-12-17 PROCEDURE — 81001 URINALYSIS AUTO W/SCOPE: CPT

## 2017-12-17 PROCEDURE — 82962 GLUCOSE BLOOD TEST: CPT

## 2017-12-17 PROCEDURE — 86403 PARTICLE AGGLUT ANTBDY SCRN: CPT

## 2017-12-17 PROCEDURE — 81025 URINE PREGNANCY TEST: CPT

## 2017-12-17 PROCEDURE — 71046 X-RAY EXAM CHEST 2 VIEWS: CPT

## 2017-12-17 PROCEDURE — 94640 AIRWAY INHALATION TREATMENT: CPT

## 2017-12-17 PROCEDURE — 83036 HEMOGLOBIN GLYCOSYLATED A1C: CPT

## 2017-12-17 PROCEDURE — 96374 THER/PROPH/DIAG INJ IV PUSH: CPT

## 2017-12-17 PROCEDURE — 87798 DETECT AGENT NOS DNA AMP: CPT

## 2017-12-17 PROCEDURE — 84484 ASSAY OF TROPONIN QUANT: CPT

## 2017-12-17 PROCEDURE — 82550 ASSAY OF CK (CPK): CPT

## 2017-12-17 PROCEDURE — 85610 PROTHROMBIN TIME: CPT

## 2017-12-17 PROCEDURE — 87070 CULTURE OTHR SPECIMN AEROBIC: CPT

## 2017-12-17 PROCEDURE — 87040 BLOOD CULTURE FOR BACTERIA: CPT

## 2017-12-17 PROCEDURE — 82553 CREATINE MB FRACTION: CPT

## 2017-12-17 PROCEDURE — 85027 COMPLETE CBC AUTOMATED: CPT

## 2017-12-17 PROCEDURE — 82945 GLUCOSE OTHER FLUID: CPT

## 2017-12-17 PROCEDURE — 99285 EMERGENCY DEPT VISIT HI MDM: CPT | Mod: 25

## 2017-12-17 PROCEDURE — 84145 PROCALCITONIN (PCT): CPT

## 2017-12-17 PROCEDURE — 99233 SBSQ HOSP IP/OBS HIGH 50: CPT

## 2017-12-17 PROCEDURE — 83735 ASSAY OF MAGNESIUM: CPT

## 2017-12-17 PROCEDURE — 86592 SYPHILIS TEST NON-TREP QUAL: CPT

## 2017-12-17 PROCEDURE — 71045 X-RAY EXAM CHEST 1 VIEW: CPT

## 2017-12-17 PROCEDURE — 89051 BODY FLUID CELL COUNT: CPT

## 2017-12-17 PROCEDURE — 85652 RBC SED RATE AUTOMATED: CPT

## 2017-12-17 PROCEDURE — 87086 URINE CULTURE/COLONY COUNT: CPT

## 2017-12-17 PROCEDURE — 96376 TX/PRO/DX INJ SAME DRUG ADON: CPT

## 2017-12-17 PROCEDURE — 87102 FUNGUS ISOLATION CULTURE: CPT

## 2017-12-17 PROCEDURE — 84157 ASSAY OF PROTEIN OTHER: CPT

## 2017-12-17 PROCEDURE — 87116 MYCOBACTERIA CULTURE: CPT

## 2017-12-17 PROCEDURE — 76000 FLUOROSCOPY <1 HR PHYS/QHP: CPT

## 2017-12-17 PROCEDURE — 94760 N-INVAS EAR/PLS OXIMETRY 1: CPT

## 2017-12-17 PROCEDURE — 86140 C-REACTIVE PROTEIN: CPT

## 2017-12-17 PROCEDURE — 36415 COLL VENOUS BLD VENIPUNCTURE: CPT

## 2017-12-17 PROCEDURE — 87486 CHLMYD PNEUM DNA AMP PROBE: CPT

## 2017-12-17 PROCEDURE — 99232 SBSQ HOSP IP/OBS MODERATE 35: CPT

## 2017-12-17 PROCEDURE — 96372 THER/PROPH/DIAG INJ SC/IM: CPT | Mod: 59

## 2017-12-17 RX ORDER — PANTOPRAZOLE SODIUM 20 MG/1
40 TABLET, DELAYED RELEASE ORAL
Qty: 0 | Refills: 0 | Status: DISCONTINUED | OUTPATIENT
Start: 2017-12-17 | End: 2017-12-17

## 2017-12-17 RX ORDER — AZITHROMYCIN 500 MG/1
250 TABLET, FILM COATED ORAL DAILY
Qty: 0 | Refills: 0 | Status: DISCONTINUED | OUTPATIENT
Start: 2017-12-17 | End: 2017-12-17

## 2017-12-17 RX ORDER — LORATADINE 10 MG/1
1 TABLET ORAL
Qty: 0 | Refills: 0 | COMMUNITY
Start: 2017-12-17

## 2017-12-17 RX ORDER — CEFUROXIME AXETIL 250 MG
1 TABLET ORAL
Qty: 6 | Refills: 0 | OUTPATIENT
Start: 2017-12-17 | End: 2017-12-19

## 2017-12-17 RX ORDER — HYDROMORPHONE HYDROCHLORIDE 2 MG/ML
1 INJECTION INTRAMUSCULAR; INTRAVENOUS; SUBCUTANEOUS EVERY 6 HOURS
Qty: 0 | Refills: 0 | Status: DISCONTINUED | OUTPATIENT
Start: 2017-12-17 | End: 2017-12-17

## 2017-12-17 RX ORDER — HYDROMORPHONE HYDROCHLORIDE 2 MG/ML
1 INJECTION INTRAMUSCULAR; INTRAVENOUS; SUBCUTANEOUS
Qty: 8 | Refills: 0 | OUTPATIENT
Start: 2017-12-17 | End: 2017-12-18

## 2017-12-17 RX ORDER — CEFUROXIME AXETIL 250 MG
250 TABLET ORAL EVERY 12 HOURS
Qty: 0 | Refills: 0 | Status: DISCONTINUED | OUTPATIENT
Start: 2017-12-17 | End: 2017-12-17

## 2017-12-17 RX ORDER — KETOROLAC TROMETHAMINE 30 MG/ML
10 SYRINGE (ML) INJECTION EVERY 6 HOURS
Qty: 0 | Refills: 0 | Status: DISCONTINUED | OUTPATIENT
Start: 2017-12-17 | End: 2017-12-17

## 2017-12-17 RX ORDER — AZITHROMYCIN 500 MG/1
1 TABLET, FILM COATED ORAL
Qty: 3 | Refills: 0 | OUTPATIENT
Start: 2017-12-17 | End: 2017-12-19

## 2017-12-17 RX ORDER — IPRATROPIUM/ALBUTEROL SULFATE 18-103MCG
3 AEROSOL WITH ADAPTER (GRAM) INHALATION
Qty: 0 | Refills: 0 | COMMUNITY

## 2017-12-17 RX ORDER — DIPHENHYDRAMINE HCL 50 MG
50 CAPSULE ORAL DAILY
Qty: 0 | Refills: 0 | Status: DISCONTINUED | OUTPATIENT
Start: 2017-12-17 | End: 2017-12-17

## 2017-12-17 RX ORDER — INSULIN GLARGINE 100 [IU]/ML
30 INJECTION, SOLUTION SUBCUTANEOUS
Qty: 0 | Refills: 0 | COMMUNITY
Start: 2017-12-17

## 2017-12-17 RX ORDER — ALBUTEROL 90 UG/1
1 AEROSOL, METERED ORAL
Qty: 0 | Refills: 0 | COMMUNITY
Start: 2017-12-17

## 2017-12-17 RX ADMIN — HYDROMORPHONE HYDROCHLORIDE 1 MILLIGRAM(S): 2 INJECTION INTRAMUSCULAR; INTRAVENOUS; SUBCUTANEOUS at 11:53

## 2017-12-17 RX ADMIN — MOMETASONE FUROATE 1 PUFF(S): 220 INHALANT RESPIRATORY (INHALATION) at 08:28

## 2017-12-17 RX ADMIN — Medication 40 MILLIGRAM(S): at 06:28

## 2017-12-17 RX ADMIN — Medication 4 MILLIGRAM(S): at 11:56

## 2017-12-17 RX ADMIN — HYDROMORPHONE HYDROCHLORIDE 0.5 MILLIGRAM(S): 2 INJECTION INTRAMUSCULAR; INTRAVENOUS; SUBCUTANEOUS at 04:41

## 2017-12-17 RX ADMIN — AZITHROMYCIN 250 MILLIGRAM(S): 500 TABLET, FILM COATED ORAL at 11:58

## 2017-12-17 RX ADMIN — HYDROMORPHONE HYDROCHLORIDE 0.5 MILLIGRAM(S): 2 INJECTION INTRAMUSCULAR; INTRAVENOUS; SUBCUTANEOUS at 00:36

## 2017-12-17 RX ADMIN — Medication 3 MILLILITER(S): at 02:31

## 2017-12-17 RX ADMIN — Medication 75 MILLIGRAM(S): at 11:58

## 2017-12-17 RX ADMIN — AZITHROMYCIN 255 MILLIGRAM(S): 500 TABLET, FILM COATED ORAL at 09:16

## 2017-12-17 RX ADMIN — CARVEDILOL PHOSPHATE 25 MILLIGRAM(S): 80 CAPSULE, EXTENDED RELEASE ORAL at 06:28

## 2017-12-17 RX ADMIN — Medication 10 UNIT(S): at 12:41

## 2017-12-17 RX ADMIN — Medication 10 UNIT(S): at 08:27

## 2017-12-17 RX ADMIN — INSULIN GLARGINE 30 UNIT(S): 100 INJECTION, SOLUTION SUBCUTANEOUS at 08:28

## 2017-12-17 RX ADMIN — HYDROMORPHONE HYDROCHLORIDE 0.5 MILLIGRAM(S): 2 INJECTION INTRAMUSCULAR; INTRAVENOUS; SUBCUTANEOUS at 01:54

## 2017-12-17 RX ADMIN — Medication 50 MILLIGRAM(S): at 09:16

## 2017-12-17 RX ADMIN — MONTELUKAST 10 MILLIGRAM(S): 4 TABLET, CHEWABLE ORAL at 11:58

## 2017-12-17 RX ADMIN — Medication 9: at 08:27

## 2017-12-17 RX ADMIN — Medication 81 MILLIGRAM(S): at 11:55

## 2017-12-17 RX ADMIN — Medication 15: at 12:41

## 2017-12-17 RX ADMIN — Medication 1 TABLET(S): at 11:55

## 2017-12-17 RX ADMIN — Medication 3 MILLILITER(S): at 07:50

## 2017-12-17 RX ADMIN — LORATADINE 10 MILLIGRAM(S): 10 TABLET ORAL at 11:58

## 2017-12-17 RX ADMIN — Medication 300 MILLIGRAM(S): at 06:28

## 2017-12-17 RX ADMIN — ENOXAPARIN SODIUM 40 MILLIGRAM(S): 100 INJECTION SUBCUTANEOUS at 11:55

## 2017-12-17 RX ADMIN — LISINOPRIL 20 MILLIGRAM(S): 2.5 TABLET ORAL at 06:30

## 2017-12-17 RX ADMIN — PANTOPRAZOLE SODIUM 40 MILLIGRAM(S): 20 TABLET, DELAYED RELEASE ORAL at 06:28

## 2017-12-17 NOTE — PROGRESS NOTE ADULT - PROBLEM SELECTOR PLAN 2
-positive for RSV  -supportive care  -monitor o2 sat  -Continue duoneb treatments

## 2017-12-17 NOTE — PROGRESS NOTE ADULT - PROBLEM SELECTOR PLAN 9
chronic  Continue Simvastatin
Continue Simvastatin
chronic  Continue Simvastatin
chronic  Continue Simvastatin

## 2017-12-17 NOTE — PROGRESS NOTE ADULT - PROVIDER SPECIALTY LIST ADULT
Cardiology
Critical Care
Hospitalist
Pulmonology
Hospitalist

## 2017-12-17 NOTE — PROGRESS NOTE ADULT - PROBLEM SELECTOR PROBLEM 8
MTHFR (methylene THF reductase) deficiency and homocystinuria

## 2017-12-17 NOTE — PROGRESS NOTE ADULT - PROBLEM SELECTOR PLAN 5
-Continue with Lisinopril
chronic  -Continue with Lisinopril

## 2017-12-17 NOTE — PROGRESS NOTE ADULT - PROBLEM SELECTOR PLAN 7
Continue cardizem and carvedilol

## 2017-12-17 NOTE — PROGRESS NOTE ADULT - NSHPATTENDINGPLANDISCUSS_GEN_ALL_CORE
patient, consults
patient
Dr. Christian ICU
patient, Dr Lassiter, cardio, nurse, administrative nurse mello

## 2017-12-17 NOTE — PROGRESS NOTE ADULT - PROBLEM SELECTOR PLAN 4
-Continue with Lantus 10 units in the morning  -ISS, Hypoglycemia protocol
-Continue with Lantus as per Endo  -ISS, Hypoglycemia protocol  -apprec endocrine recs Dr. Perlman
-Continue with Lantus 10 units in the morning  -ISS, Hypoglycemia protocol  -apprec endocrine recs Dr. Perlman
-Continue with Lantus as per Endo  -ISS, Hypoglycemia protocol  -apprec endocrine recs Dr. Perlman

## 2017-12-17 NOTE — PROGRESS NOTE ADULT - ASSESSMENT
47 year old female with PMH of asthma, w hx of 21 intubations,  right heart failure, diabetes, Essential HTN, MTHFR deficiency and homocysteinuria, Prinzmetal angina presents w PNA   - She is doing better overall. No CP reported. Dyspnea improving.   - Still with wheezing. Cont pulm Rx  - Appears compensated overall from HF POV. Hold on diuretics for today  - Continue her Coreg 25mg q12 and Dilt cd 300mg Qday  - cont lisinopril   - Monitor and replete electrolytes. Keep K>4.0 and Mg>2.0.   - D/C remote tele but continue pulse ox monitoring.   - Rx for the flu  - Further cardiac workup will depend on clinical course.   - All other workup per primary team. Will followup.

## 2017-12-17 NOTE — PROGRESS NOTE ADULT - SUBJECTIVE AND OBJECTIVE BOX
[facility] -- Sachin Abreu, Kelli Hernandes Pannella, Patel, Savella  Office # [phone]      Follow Up:  dyspnea,     Subjective/Observations: Patient seen and examined. Events noted. Resting comfortably in bed. No complaints of chest pain,  or palpitations reported. No signs of orthopnea or PND. Dyspnea is improving.       REVIEW OF SYSTEMS: All other review of systems is negative unless indicated above    PAST MEDICAL & SURGICAL HISTORY:  Essential hypertension, hypertension with unspecified goal  Diabetes mellitus type 2 in obese  Migraine  MTHFR (methylene THF reductase) deficiency and homocystinuria  Polycystic disease, ovaries  CVA (cerebral vascular accident)  GERD (gastroesophageal reflux disease)  Obesity  Prinzmetal Angina  Asthma: intubated in the past  H/O ovarian cystectomy  Salpingo-oophoritis: oophrectomy  History of tonsillectomy  History of Cholecystectomy  Status Post Nissen Fundoplication (with Gastrostomy Tube Placement)      MEDICATIONS  (STANDING):  ALBUTerol    90 MICROgram(s) HFA Inhaler 1 Puff(s) Inhalation every 4 hours  ALBUTerol/ipratropium for Nebulization 3 milliLiter(s) Nebulizer every 6 hours  aspirin enteric coated 81 milliGRAM(s) Oral daily  azithromycin  IVPB 500 milliGRAM(s) IV Intermittent every 24 hours  azithromycin  IVPB      calcium carbonate 500 mG (Tums) Chewable 1 Tablet(s) Chew daily  carvedilol 25 milliGRAM(s) Oral every 12 hours  cefTRIAXone   IVPB 1 Gram(s) IV Intermittent every 24 hours  dextrose 5%. 1000 milliLiter(s) (50 mL/Hr) IV Continuous <Continuous>  dextrose 50% Injectable 12.5 Gram(s) IV Push once  dextrose 50% Injectable 25 Gram(s) IV Push once  dextrose 50% Injectable 25 Gram(s) IV Push once  diltiazem    milliGRAM(s) Oral daily  enoxaparin Injectable 40 milliGRAM(s) SubCutaneous daily  folic acid 4 milliGRAM(s) Oral daily  insulin glargine Injectable (LANTUS) 30 Unit(s) SubCutaneous two times a day  insulin lispro (HumaLOG) corrective regimen sliding scale   SubCutaneous at bedtime  insulin lispro (HumaLOG) corrective regimen sliding scale   SubCutaneous three times a day before meals  insulin lispro Injectable (HumaLOG) 10 Unit(s) SubCutaneous three times a day before meals  lisinopril 20 milliGRAM(s) Oral daily  loratadine 10 milliGRAM(s) Oral daily  mometasone 220 MICROgram(s) Inhaler 1 Puff(s) Inhalation daily  montelukast 10 milliGRAM(s) Oral daily  oseltamivir 75 milliGRAM(s) Oral daily  pantoprazole    Tablet 40 milliGRAM(s) Oral before breakfast  predniSONE   Tablet 40 milliGRAM(s) Oral every 12 hours  simvastatin 20 milliGRAM(s) Oral at bedtime  tiotropium 18 MICROgram(s) Capsule 1 Capsule(s) Inhalation daily    MEDICATIONS  (PRN):  dextrose Gel 1 Dose(s) Oral once PRN Blood Glucose LESS THAN 70 milliGRAM(s)/deciliter  diphenhydrAMINE   Injectable 50 milliGRAM(s) IV Push daily PRN Rash and/or Itching  furosemide    Tablet 40 milliGRAM(s) Oral every other day PRN edema  glucagon  Injectable 1 milliGRAM(s) IntraMuscular once PRN Glucose LESS THAN 70 milligrams/deciliter  HYDROmorphone  Injectable 0.5 milliGRAM(s) IV Push every 3 hours PRN Severe Pain (7 - 10)  ketorolac   Injectable 30 milliGRAM(s) IV Push every 8 hours PRN Moderate Pain (4 - 6)  metolazone 5 milliGRAM(s) Oral every 24 hours PRN water retention      Allergies    Depakote (Unknown)  Diprivan (Nausea)  gadolinium containing compounds (Angioedema)  latex (Unknown)  levofloxacin (Anaphylaxis (Mod to Severe))  nutrasweet (Unknown)  Ofirmev (Anaphylaxis)  propofol (Unknown)    Intolerances            Vital Signs Last 24 Hrs  T(C): 36.8 (17 Dec 2017 04:45), Max: 36.9 (16 Dec 2017 14:09)  T(F): 98.2 (17 Dec 2017 04:45), Max: 98.5 (16 Dec 2017 19:25)  HR: 82 (17 Dec 2017 04:45) (81 - 92)  BP: 117/74 (17 Dec 2017 04:45) (99/63 - 142/74)  BP(mean): --  RR: 16 (17 Dec 2017 04:45) (16 - 16)  SpO2: 93% (17 Dec 2017 04:45) (92% - 96%)    I&O's Summary    16 Dec 2017 07:01  -  17 Dec 2017 07:00  --------------------------------------------------------  IN: 300 mL / OUT: 0 mL / NET: 300 mL          PHYSICAL EXAM:  TELE: SR   Constitutional: NAD, awake and alert, well-developed  HEENT: Moist Mucous Membranes, Anicteric  Pulmonary: Decreased breath sounds b/l. with scattered wheezing No rales, crackles  appreciated.   Cardiovascular: Regular, S1 and S2, No murmurs, rubs, gallops or clicks  Gastrointestinal: Bowel Sounds present, soft, nontender.   Lymph: No peripheral edema. No lymphadenopathy.  Skin: No visible rashes or ulcers.  Psych:  Mood & affect appropriate    LABS: All Labs Reviewed:                        12.3   11.7  )-----------( 247      ( 16 Dec 2017 09:23 )             38.8                         12.5   15.2  )-----------( 273      ( 15 Dec 2017 08:02 )             40.1     16 Dec 2017 09:23    137    |  103    |  22     ----------------------------<  324    5.2     |  26     |  0.73   15 Dec 2017 08:02    137    |  104    |  21     ----------------------------<  371    4.3     |  22     |  0.86     Ca    8.5        16 Dec 2017 09:23  Ca    8.0        15 Dec 2017 08:02  Phos  2.9       15 Dec 2017 08:02  Mg     2.3       15 Dec 2017 08:02    TPro  6.5    /  Alb  2.9    /  TBili  0.4    /  DBili  x      /  AST  12     /  ALT  37     /  AlkPhos  113    15 Dec 2017 08:02

## 2017-12-17 NOTE — PROGRESS NOTE ADULT - PROBLEM SELECTOR PROBLEM 2
URI (upper respiratory infection)

## 2017-12-17 NOTE — PROGRESS NOTE ADULT - PROBLEM SELECTOR PROBLEM 4
Diabetes mellitus type 2 in obese

## 2017-12-17 NOTE — CHART NOTE - NSCHARTNOTEFT_GEN_A_CORE
At 2:50 pm spoke to Dr. Lassiter that patient can be discharged from pulmonary stand point. At 3:08 notified by nurse Phelan that patient did not want to wait for discharge paper work.  At 3:21 spoke to Nurse Collins  for [place] advised patient needs to have dc summary mailed because she left without signature and appropriate discharge instructions from nurse.  Discharge summary was done by myself by 15:16 but patient did not want to wait.  Nursing aware, administration aware,  aware, prescriptions sent. DC summary to be mailed. At 2:50 pm spoke to Dr. Lassiter that patient can be discharged from pulmonary stand point. At 3:08 notified by nurse Phelan that patient did not want to wait for discharge paper work.  At 3:21 spoke to Nurse Collins  for [place] advised patient needs to have dc summary mailed because she left without signature and appropriate discharge instructions from nurse.  Discharge summary was done by myself by 15:16 but patient did not want to wait.  AMA form patient left without discharge instructions in chart.  Nursing aware, administration aware,  aware, prescriptions sent. DC summary to be mailed.

## 2017-12-17 NOTE — PROGRESS NOTE ADULT - PROBLEM SELECTOR PLAN 1
-Likely resulting from URI vs early pneumonia  -Continue with solumedrol 40 q12, poss change to qdaily  -Continue duonebs, albuterol PRN  -Monitor O2 saturation, oxygen delivery as needed  -Continue ceftriaxone pneumonia  -ambulate as tolerated  -DASH/ TLC diet  -vitals per routine  -Pulmonology consulted: Dr. Maikol alberts apprec
-Likely resulting from URI vs early pneumonia  -Continue prednisone taper slowly   -Continue duonebs, albuterol PRN  -Monitor O2 saturation, oxygen delivery as needed  -Ceftin + azithromycin for CAP/ pneumonia  -Pulmonology consulted: Dr. Maikol alberts apprec  -monitor pulse ox, wean of supplemental -02
-Likely resulting from URI vs early pneumonia  -Continue with solumedrol 40 q12, poss change to qdaily  -Continue duonebs, albuterol PRN  -Monitor O2 saturation, oxygen delivery as needed  -Continue ceftriaxone pneumonia  -ambulate as tolerated  -DASH/ TLC diet  -vitals per routine  -Pulmonology consulted: Dr. Maikol alberts apprec
-Likely resulting from URI vs early pneumonia  -Continue prednisone taper slowly   -Continue duonebs, albuterol PRN  -Monitor O2 saturation, oxygen delivery as needed  -Continue ceftriaxone + azitrhomycin for CAP/ pneumonia  -Pulmonology consulted: Dr. Maikol alberts apprec  -monitor pulse ox, wean of supplemental -02

## 2017-12-17 NOTE — PROGRESS NOTE ADULT - SUBJECTIVE AND OBJECTIVE BOX
INTERVAL HPI/OVERNIGHT EVENTS: Patient seen and examined at bedside. No acute events overnight.   Spoke to Pulmonologist, cleared for discharge, patient could not wait for appropriate discharge instructions by nurse and eloped.    MEDICATIONS  (STANDING):  ALBUTerol    90 MICROgram(s) HFA Inhaler 1 Puff(s) Inhalation every 4 hours  ALBUTerol/ipratropium for Nebulization 3 milliLiter(s) Nebulizer every 6 hours  aspirin enteric coated 81 milliGRAM(s) Oral daily  azithromycin   Tablet 250 milliGRAM(s) Oral daily  calcium carbonate 500 mG (Tums) Chewable 1 Tablet(s) Chew daily  carvedilol 25 milliGRAM(s) Oral every 12 hours  cefuroxime   Tablet 250 milliGRAM(s) Oral every 12 hours  dextrose 5%. 1000 milliLiter(s) (50 mL/Hr) IV Continuous <Continuous>  dextrose 50% Injectable 12.5 Gram(s) IV Push once  dextrose 50% Injectable 25 Gram(s) IV Push once  dextrose 50% Injectable 25 Gram(s) IV Push once  diltiazem    milliGRAM(s) Oral daily  enoxaparin Injectable 40 milliGRAM(s) SubCutaneous daily  folic acid 4 milliGRAM(s) Oral daily  insulin glargine Injectable (LANTUS) 30 Unit(s) SubCutaneous two times a day  insulin lispro (HumaLOG) corrective regimen sliding scale   SubCutaneous at bedtime  insulin lispro (HumaLOG) corrective regimen sliding scale   SubCutaneous three times a day before meals  insulin lispro Injectable (HumaLOG) 10 Unit(s) SubCutaneous three times a day before meals  lisinopril 20 milliGRAM(s) Oral daily  loratadine 10 milliGRAM(s) Oral daily  mometasone 220 MICROgram(s) Inhaler 1 Puff(s) Inhalation daily  montelukast 10 milliGRAM(s) Oral daily  oseltamivir 75 milliGRAM(s) Oral daily  pantoprazole    Tablet 40 milliGRAM(s) Oral two times a day before meals  predniSONE   Tablet 40 milliGRAM(s) Oral every 12 hours  simvastatin 20 milliGRAM(s) Oral at bedtime  tiotropium 18 MICROgram(s) Capsule 1 Capsule(s) Inhalation daily    MEDICATIONS  (PRN):  dextrose Gel 1 Dose(s) Oral once PRN Blood Glucose LESS THAN 70 milliGRAM(s)/deciliter  diphenhydrAMINE   Injectable 50 milliGRAM(s) IntraMuscular daily PRN Rash and/or Itching  furosemide    Tablet 40 milliGRAM(s) Oral every other day PRN edema  glucagon  Injectable 1 milliGRAM(s) IntraMuscular once PRN Glucose LESS THAN 70 milligrams/deciliter  HYDROmorphone  Injectable 1 milliGRAM(s) IntraMuscular every 6 hours PRN Severe Pain (7 - 10)  ketorolac 10 milliGRAM(s) Oral every 6 hours PRN Moderate Pain (4 - 6)  metolazone 5 milliGRAM(s) Oral every 24 hours PRN water retention    Allergies    Depakote (Unknown)  Diprivan (Nausea)  gadolinium containing compounds (Angioedema)  latex (Unknown)  levofloxacin (Anaphylaxis (Mod to Severe))  nutrasweet (Unknown)  Ofirmev (Anaphylaxis)  propofol (Unknown)    Intolerances        CONSTITUTIONAL: No weakness, fevers or chills  RESPIRATORY: No cough, +wheezing, No hemoptysis; +shortness of breath  Cvs: No chest pain or palpitations  Gi: No abdominal pain. No nausea, vomiting, diarrhea or constipation. No melena or hematochezia.  Neuro: No weakness    All other review of systems is negative unless indicated above.    Vital Signs Last 24 Hrs  T(C): 36.8 (17 Dec 2017 04:45), Max: 36.9 (16 Dec 2017 19:25)  T(F): 98.2 (17 Dec 2017 04:45), Max: 98.5 (16 Dec 2017 19:25)  HR: 84 (17 Dec 2017 07:50) (81 - 90)  BP: 117/74 (17 Dec 2017 04:45) (99/63 - 117/74)  BP(mean): --  RR: 16 (17 Dec 2017 04:45) (16 - 16)  SpO2: 94% (17 Dec 2017 07:50) (93% - 95%)    General: NAD  Neurology: A&Ox 3, nonfocal  Respiratory: +wheezing   CV: RRR, S1S2  Abdominal: Soft, NT, ND +BS  Extremities: No edema, + peripheral pulses      LABS:                        12.7   11.6  )-----------( 279      ( 17 Dec 2017 11:28 )             40.1     12-16    137  |  103  |  22  ----------------------------<  324<H>  5.2   |  26  |  0.73    Ca    8.5      16 Dec 2017 09:23                  RADIOLOGY & ADDITIONAL TESTS:

## 2017-12-17 NOTE — PROGRESS NOTE ADULT - PROBLEM SELECTOR PROBLEM 1
Asthma exacerbation, non-allergic, unspecified asthma severity

## 2018-01-13 LAB
CULTURE RESULTS: SIGNIFICANT CHANGE UP
SPECIMEN SOURCE: SIGNIFICANT CHANGE UP

## 2018-07-08 ENCOUNTER — INPATIENT (INPATIENT)
Facility: HOSPITAL | Age: 48
LOS: 3 days | Discharge: HOME | DRG: 202 | End: 2018-07-12
Attending: FAMILY MEDICINE | Admitting: FAMILY MEDICINE
Payer: COMMERCIAL

## 2018-07-08 VITALS
WEIGHT: 293 LBS | SYSTOLIC BLOOD PRESSURE: 134 MMHG | HEART RATE: 117 BPM | TEMPERATURE: 99 F | HEIGHT: 64 IN | RESPIRATION RATE: 18 BRPM | DIASTOLIC BLOOD PRESSURE: 83 MMHG | OXYGEN SATURATION: 94 %

## 2018-07-08 DIAGNOSIS — B34.9 VIRAL INFECTION, UNSPECIFIED: ICD-10-CM

## 2018-07-08 DIAGNOSIS — K21.9 GASTRO-ESOPHAGEAL REFLUX DISEASE WITHOUT ESOPHAGITIS: ICD-10-CM

## 2018-07-08 DIAGNOSIS — I10 ESSENTIAL (PRIMARY) HYPERTENSION: ICD-10-CM

## 2018-07-08 DIAGNOSIS — J45.901 UNSPECIFIED ASTHMA WITH (ACUTE) EXACERBATION: ICD-10-CM

## 2018-07-08 DIAGNOSIS — E78.5 HYPERLIPIDEMIA, UNSPECIFIED: ICD-10-CM

## 2018-07-08 DIAGNOSIS — I50.810 RIGHT HEART FAILURE, UNSPECIFIED: ICD-10-CM

## 2018-07-08 DIAGNOSIS — Z29.9 ENCOUNTER FOR PROPHYLACTIC MEASURES, UNSPECIFIED: ICD-10-CM

## 2018-07-08 DIAGNOSIS — I63.9 CEREBRAL INFARCTION, UNSPECIFIED: ICD-10-CM

## 2018-07-08 DIAGNOSIS — I20.1 ANGINA PECTORIS WITH DOCUMENTED SPASM: ICD-10-CM

## 2018-07-08 DIAGNOSIS — E11.9 TYPE 2 DIABETES MELLITUS WITHOUT COMPLICATIONS: ICD-10-CM

## 2018-07-08 LAB
ALBUMIN SERPL ELPH-MCNC: 3.2 G/DL — LOW (ref 3.3–5)
ALP SERPL-CCNC: 153 U/L — HIGH (ref 40–120)
ALT FLD-CCNC: 78 U/L — SIGNIFICANT CHANGE UP (ref 12–78)
ANION GAP SERPL CALC-SCNC: 9 MMOL/L — SIGNIFICANT CHANGE UP (ref 5–17)
AST SERPL-CCNC: 60 U/L — HIGH (ref 15–37)
BASOPHILS # BLD AUTO: 0.04 K/UL — SIGNIFICANT CHANGE UP (ref 0–0.2)
BASOPHILS NFR BLD AUTO: 0.4 % — SIGNIFICANT CHANGE UP (ref 0–2)
BILIRUB SERPL-MCNC: 0.4 MG/DL — SIGNIFICANT CHANGE UP (ref 0.2–1.2)
BUN SERPL-MCNC: 9 MG/DL — SIGNIFICANT CHANGE UP (ref 7–23)
CALCIUM SERPL-MCNC: 8.4 MG/DL — LOW (ref 8.5–10.1)
CHLORIDE SERPL-SCNC: 104 MMOL/L — SIGNIFICANT CHANGE UP (ref 96–108)
CO2 SERPL-SCNC: 25 MMOL/L — SIGNIFICANT CHANGE UP (ref 22–31)
CREAT SERPL-MCNC: 0.57 MG/DL — SIGNIFICANT CHANGE UP (ref 0.5–1.3)
EOSINOPHIL # BLD AUTO: 0.32 K/UL — SIGNIFICANT CHANGE UP (ref 0–0.5)
EOSINOPHIL NFR BLD AUTO: 3.2 % — SIGNIFICANT CHANGE UP (ref 0–6)
GLUCOSE SERPL-MCNC: 149 MG/DL — HIGH (ref 70–99)
HCT VFR BLD CALC: 43.8 % — SIGNIFICANT CHANGE UP (ref 34.5–45)
HGB BLD-MCNC: 14.9 G/DL — SIGNIFICANT CHANGE UP (ref 11.5–15.5)
IMM GRANULOCYTES NFR BLD AUTO: 0.4 % — SIGNIFICANT CHANGE UP (ref 0–1.5)
LACTATE SERPL-SCNC: 1.5 MMOL/L — SIGNIFICANT CHANGE UP (ref 0.7–2)
LIDOCAIN IGE QN: 97 U/L — SIGNIFICANT CHANGE UP (ref 73–393)
LYMPHOCYTES # BLD AUTO: 2.36 K/UL — SIGNIFICANT CHANGE UP (ref 1–3.3)
LYMPHOCYTES # BLD AUTO: 23.9 % — SIGNIFICANT CHANGE UP (ref 13–44)
MCHC RBC-ENTMCNC: 28.3 PG — SIGNIFICANT CHANGE UP (ref 27–34)
MCHC RBC-ENTMCNC: 34 GM/DL — SIGNIFICANT CHANGE UP (ref 32–36)
MCV RBC AUTO: 83.3 FL — SIGNIFICANT CHANGE UP (ref 80–100)
MONOCYTES # BLD AUTO: 0.6 K/UL — SIGNIFICANT CHANGE UP (ref 0–0.9)
MONOCYTES NFR BLD AUTO: 6.1 % — SIGNIFICANT CHANGE UP (ref 2–14)
NEUTROPHILS # BLD AUTO: 6.5 K/UL — SIGNIFICANT CHANGE UP (ref 1.8–7.4)
NEUTROPHILS NFR BLD AUTO: 66 % — SIGNIFICANT CHANGE UP (ref 43–77)
PLATELET # BLD AUTO: 329 K/UL — SIGNIFICANT CHANGE UP (ref 150–400)
POTASSIUM SERPL-MCNC: 4.1 MMOL/L — SIGNIFICANT CHANGE UP (ref 3.5–5.3)
POTASSIUM SERPL-SCNC: 4.1 MMOL/L — SIGNIFICANT CHANGE UP (ref 3.5–5.3)
PROCALCITONIN SERPL-MCNC: <0.05 — SIGNIFICANT CHANGE UP (ref 0–0.04)
PROT SERPL-MCNC: 8.2 G/DL — SIGNIFICANT CHANGE UP (ref 6–8.3)
RAPID RVP RESULT: SIGNIFICANT CHANGE UP
RBC # BLD: 5.26 M/UL — HIGH (ref 3.8–5.2)
RBC # FLD: 13.8 % — SIGNIFICANT CHANGE UP (ref 10.3–14.5)
S PYO AG SPEC QL IA: NEGATIVE — SIGNIFICANT CHANGE UP
SODIUM SERPL-SCNC: 138 MMOL/L — SIGNIFICANT CHANGE UP (ref 135–145)
WBC # BLD: 9.86 K/UL — SIGNIFICANT CHANGE UP (ref 3.8–10.5)
WBC # FLD AUTO: 9.86 K/UL — SIGNIFICANT CHANGE UP (ref 3.8–10.5)

## 2018-07-08 PROCEDURE — 99285 EMERGENCY DEPT VISIT HI MDM: CPT

## 2018-07-08 PROCEDURE — 71046 X-RAY EXAM CHEST 2 VIEWS: CPT | Mod: 26

## 2018-07-08 PROCEDURE — 99223 1ST HOSP IP/OBS HIGH 75: CPT | Mod: AI,GC

## 2018-07-08 PROCEDURE — 93010 ELECTROCARDIOGRAM REPORT: CPT

## 2018-07-08 RX ORDER — CHOLECALCIFEROL (VITAMIN D3) 125 MCG
1 CAPSULE ORAL
Qty: 0 | Refills: 0 | COMMUNITY

## 2018-07-08 RX ORDER — INSULIN LISPRO 100/ML
VIAL (ML) SUBCUTANEOUS
Qty: 0 | Refills: 0 | Status: DISCONTINUED | OUTPATIENT
Start: 2018-07-08 | End: 2018-07-10

## 2018-07-08 RX ORDER — IPRATROPIUM/ALBUTEROL SULFATE 18-103MCG
3 AEROSOL WITH ADAPTER (GRAM) INHALATION ONCE
Qty: 0 | Refills: 0 | Status: COMPLETED | OUTPATIENT
Start: 2018-07-08 | End: 2018-07-08

## 2018-07-08 RX ORDER — INSULIN LISPRO 100/ML
0 VIAL (ML) SUBCUTANEOUS
Qty: 0 | Refills: 0 | COMMUNITY

## 2018-07-08 RX ORDER — ALBUTEROL 90 UG/1
3 AEROSOL, METERED ORAL
Qty: 0 | Refills: 0 | COMMUNITY

## 2018-07-08 RX ORDER — IPRATROPIUM/ALBUTEROL SULFATE 18-103MCG
3 AEROSOL WITH ADAPTER (GRAM) INHALATION EVERY 6 HOURS
Qty: 0 | Refills: 0 | Status: DISCONTINUED | OUTPATIENT
Start: 2018-07-08 | End: 2018-07-09

## 2018-07-08 RX ORDER — SODIUM CHLORIDE 9 MG/ML
1000 INJECTION INTRAMUSCULAR; INTRAVENOUS; SUBCUTANEOUS ONCE
Qty: 0 | Refills: 0 | Status: COMPLETED | OUTPATIENT
Start: 2018-07-08 | End: 2018-07-08

## 2018-07-08 RX ORDER — TIOTROPIUM BROMIDE AND OLODATEROL 3.124; 2.736 UG/1; UG/1
2 SPRAY, METERED RESPIRATORY (INHALATION)
Qty: 0 | Refills: 0 | COMMUNITY

## 2018-07-08 RX ORDER — DIPHENHYDRAMINE HCL 50 MG
4 CAPSULE ORAL
Qty: 0 | Refills: 0 | COMMUNITY

## 2018-07-08 RX ORDER — QUINAPRIL HYDROCHLORIDE 40 MG/1
1 TABLET, FILM COATED ORAL
Qty: 0 | Refills: 0 | COMMUNITY

## 2018-07-08 RX ORDER — DEXTROSE 50 % IN WATER 50 %
25 SYRINGE (ML) INTRAVENOUS ONCE
Qty: 0 | Refills: 0 | Status: DISCONTINUED | OUTPATIENT
Start: 2018-07-08 | End: 2018-07-12

## 2018-07-08 RX ORDER — INSULIN GLARGINE 100 [IU]/ML
128 INJECTION, SOLUTION SUBCUTANEOUS
Qty: 0 | Refills: 0 | COMMUNITY

## 2018-07-08 RX ORDER — DIPHENHYDRAMINE HCL 50 MG
50 CAPSULE ORAL ONCE
Qty: 0 | Refills: 0 | Status: COMPLETED | OUTPATIENT
Start: 2018-07-08 | End: 2018-07-08

## 2018-07-08 RX ORDER — SIMVASTATIN 20 MG/1
20 TABLET, FILM COATED ORAL AT BEDTIME
Qty: 0 | Refills: 0 | Status: DISCONTINUED | OUTPATIENT
Start: 2018-07-08 | End: 2018-07-12

## 2018-07-08 RX ORDER — INSULIN GLARGINE 100 [IU]/ML
50 INJECTION, SOLUTION SUBCUTANEOUS EVERY MORNING
Qty: 0 | Refills: 0 | Status: DISCONTINUED | OUTPATIENT
Start: 2018-07-08 | End: 2018-07-12

## 2018-07-08 RX ORDER — FUROSEMIDE 40 MG
1 TABLET ORAL
Qty: 0 | Refills: 0 | COMMUNITY

## 2018-07-08 RX ORDER — FOLIC ACID 0.8 MG
4 TABLET ORAL DAILY
Qty: 0 | Refills: 0 | Status: DISCONTINUED | OUTPATIENT
Start: 2018-07-08 | End: 2018-07-12

## 2018-07-08 RX ORDER — IPRATROPIUM/ALBUTEROL SULFATE 18-103MCG
3 AEROSOL WITH ADAPTER (GRAM) INHALATION
Qty: 10 | Refills: 0 | OUTPATIENT

## 2018-07-08 RX ORDER — GLUCAGON INJECTION, SOLUTION 0.5 MG/.1ML
1 INJECTION, SOLUTION SUBCUTANEOUS ONCE
Qty: 0 | Refills: 0 | Status: DISCONTINUED | OUTPATIENT
Start: 2018-07-08 | End: 2018-07-12

## 2018-07-08 RX ORDER — INSULIN GLARGINE 100 [IU]/ML
50 INJECTION, SOLUTION SUBCUTANEOUS AT BEDTIME
Qty: 0 | Refills: 0 | Status: DISCONTINUED | OUTPATIENT
Start: 2018-07-08 | End: 2018-07-12

## 2018-07-08 RX ORDER — MONTELUKAST 4 MG/1
1 TABLET, CHEWABLE ORAL
Qty: 0 | Refills: 0 | COMMUNITY

## 2018-07-08 RX ORDER — MOMETASONE FUROATE 220 UG/1
1 INHALANT RESPIRATORY (INHALATION)
Qty: 0 | Refills: 0 | COMMUNITY

## 2018-07-08 RX ORDER — CARVEDILOL PHOSPHATE 80 MG/1
25 CAPSULE, EXTENDED RELEASE ORAL EVERY 12 HOURS
Qty: 0 | Refills: 0 | Status: DISCONTINUED | OUTPATIENT
Start: 2018-07-08 | End: 2018-07-12

## 2018-07-08 RX ORDER — MOMETASONE FUROATE 50 UG/1
2 SPRAY NASAL
Qty: 0 | Refills: 0 | COMMUNITY

## 2018-07-08 RX ORDER — TIOTROPIUM BROMIDE 18 UG/1
1 CAPSULE ORAL; RESPIRATORY (INHALATION) DAILY
Qty: 0 | Refills: 0 | Status: COMPLETED | OUTPATIENT
Start: 2018-07-08 | End: 2019-06-06

## 2018-07-08 RX ORDER — LIRAGLUTIDE 6 MG/ML
1.8 INJECTION SUBCUTANEOUS
Qty: 0 | Refills: 0 | COMMUNITY

## 2018-07-08 RX ORDER — ALBUTEROL 90 UG/1
1 AEROSOL, METERED ORAL EVERY 4 HOURS
Qty: 0 | Refills: 0 | Status: DISCONTINUED | OUTPATIENT
Start: 2018-07-08 | End: 2018-07-08

## 2018-07-08 RX ORDER — OMEGA-3 ACID ETHYL ESTERS 1 G
1 CAPSULE ORAL
Qty: 0 | Refills: 0 | COMMUNITY

## 2018-07-08 RX ORDER — ALBUTEROL 90 UG/1
2.5 AEROSOL, METERED ORAL ONCE
Qty: 0 | Refills: 0 | Status: COMPLETED | OUTPATIENT
Start: 2018-07-08 | End: 2018-07-08

## 2018-07-08 RX ORDER — INSULIN LISPRO 100/ML
20 VIAL (ML) SUBCUTANEOUS
Qty: 0 | Refills: 0 | Status: DISCONTINUED | OUTPATIENT
Start: 2018-07-08 | End: 2018-07-11

## 2018-07-08 RX ORDER — CARVEDILOL PHOSPHATE 80 MG/1
1 CAPSULE, EXTENDED RELEASE ORAL
Qty: 0 | Refills: 0 | COMMUNITY

## 2018-07-08 RX ORDER — ALBUTEROL 90 UG/1
1 AEROSOL, METERED ORAL EVERY 4 HOURS
Qty: 0 | Refills: 0 | Status: COMPLETED | OUTPATIENT
Start: 2018-07-08 | End: 2019-06-06

## 2018-07-08 RX ORDER — PANTOPRAZOLE SODIUM 20 MG/1
40 TABLET, DELAYED RELEASE ORAL
Qty: 0 | Refills: 0 | Status: DISCONTINUED | OUTPATIENT
Start: 2018-07-08 | End: 2018-07-12

## 2018-07-08 RX ORDER — DIPHENHYDRAMINE HCL 50 MG
100 CAPSULE ORAL AT BEDTIME
Qty: 0 | Refills: 0 | Status: DISCONTINUED | OUTPATIENT
Start: 2018-07-09 | End: 2018-07-12

## 2018-07-08 RX ORDER — LISINOPRIL 2.5 MG/1
20 TABLET ORAL DAILY
Qty: 0 | Refills: 0 | Status: DISCONTINUED | OUTPATIENT
Start: 2018-07-08 | End: 2018-07-12

## 2018-07-08 RX ORDER — FLUTICASONE PROPIONATE 50 MCG
2 SPRAY, SUSPENSION NASAL DAILY
Qty: 0 | Refills: 0 | Status: DISCONTINUED | OUTPATIENT
Start: 2018-07-08 | End: 2018-07-12

## 2018-07-08 RX ORDER — SODIUM CHLORIDE 9 MG/ML
1000 INJECTION, SOLUTION INTRAVENOUS
Qty: 0 | Refills: 0 | Status: DISCONTINUED | OUTPATIENT
Start: 2018-07-08 | End: 2018-07-12

## 2018-07-08 RX ORDER — DEXTROSE 50 % IN WATER 50 %
15 SYRINGE (ML) INTRAVENOUS ONCE
Qty: 0 | Refills: 0 | Status: DISCONTINUED | OUTPATIENT
Start: 2018-07-08 | End: 2018-07-12

## 2018-07-08 RX ORDER — OMEGA-3 ACID ETHYL ESTERS 1 G
1200 CAPSULE ORAL
Qty: 0 | Refills: 0 | COMMUNITY

## 2018-07-08 RX ORDER — ASPIRIN/CALCIUM CARB/MAGNESIUM 324 MG
1 TABLET ORAL
Qty: 0 | Refills: 0 | COMMUNITY

## 2018-07-08 RX ORDER — FOLIC ACID 0.8 MG
4 TABLET ORAL
Qty: 0 | Refills: 0 | COMMUNITY

## 2018-07-08 RX ORDER — ASPIRIN/CALCIUM CARB/MAGNESIUM 324 MG
81 TABLET ORAL DAILY
Qty: 0 | Refills: 0 | Status: DISCONTINUED | OUTPATIENT
Start: 2018-07-08 | End: 2018-07-12

## 2018-07-08 RX ORDER — DEXTROSE 50 % IN WATER 50 %
12.5 SYRINGE (ML) INTRAVENOUS ONCE
Qty: 0 | Refills: 0 | Status: DISCONTINUED | OUTPATIENT
Start: 2018-07-08 | End: 2018-07-12

## 2018-07-08 RX ORDER — LORATADINE 10 MG/1
10 TABLET ORAL DAILY
Qty: 0 | Refills: 0 | Status: DISCONTINUED | OUTPATIENT
Start: 2018-07-09 | End: 2018-07-12

## 2018-07-08 RX ORDER — SIMVASTATIN 20 MG/1
1 TABLET, FILM COATED ORAL
Qty: 0 | Refills: 0 | COMMUNITY

## 2018-07-08 RX ORDER — MAGNESIUM SULFATE 500 MG/ML
1 VIAL (ML) INJECTION ONCE
Qty: 0 | Refills: 0 | Status: COMPLETED | OUTPATIENT
Start: 2018-07-08 | End: 2018-07-08

## 2018-07-08 RX ORDER — CALCIUM CARBONATE 500(1250)
1200 TABLET ORAL
Qty: 0 | Refills: 0 | COMMUNITY

## 2018-07-08 RX ORDER — KETOROLAC TROMETHAMINE 30 MG/ML
30 SYRINGE (ML) INJECTION ONCE
Qty: 0 | Refills: 0 | Status: DISCONTINUED | OUTPATIENT
Start: 2018-07-08 | End: 2018-07-08

## 2018-07-08 RX ORDER — MONTELUKAST 4 MG/1
10 TABLET, CHEWABLE ORAL DAILY
Qty: 0 | Refills: 0 | Status: DISCONTINUED | OUTPATIENT
Start: 2018-07-08 | End: 2018-07-12

## 2018-07-08 RX ORDER — DIPHENHYDRAMINE HCL 50 MG
1 CAPSULE ORAL
Qty: 0 | Refills: 0 | COMMUNITY

## 2018-07-08 RX ORDER — DIPHENHYDRAMINE HCL 50 MG
50 CAPSULE ORAL ONCE
Qty: 0 | Refills: 0 | Status: DISCONTINUED | OUTPATIENT
Start: 2018-07-08 | End: 2018-07-12

## 2018-07-08 RX ORDER — OMEGA-3 ACID ETHYL ESTERS 1 G
2 CAPSULE ORAL DAILY
Qty: 0 | Refills: 0 | Status: DISCONTINUED | OUTPATIENT
Start: 2018-07-08 | End: 2018-07-12

## 2018-07-08 RX ORDER — ENOXAPARIN SODIUM 100 MG/ML
40 INJECTION SUBCUTANEOUS DAILY
Qty: 0 | Refills: 0 | Status: DISCONTINUED | OUTPATIENT
Start: 2018-07-08 | End: 2018-07-09

## 2018-07-08 RX ORDER — CETIRIZINE HYDROCHLORIDE 10 MG/1
1 TABLET ORAL
Qty: 0 | Refills: 0 | COMMUNITY

## 2018-07-08 RX ADMIN — SIMVASTATIN 20 MILLIGRAM(S): 20 TABLET, FILM COATED ORAL at 21:14

## 2018-07-08 RX ADMIN — SODIUM CHLORIDE 1000 MILLILITER(S): 9 INJECTION INTRAMUSCULAR; INTRAVENOUS; SUBCUTANEOUS at 17:32

## 2018-07-08 RX ADMIN — Medication 3 MILLILITER(S): at 19:22

## 2018-07-08 RX ADMIN — Medication 6: at 22:06

## 2018-07-08 RX ADMIN — SODIUM CHLORIDE 1000 MILLILITER(S): 9 INJECTION INTRAMUSCULAR; INTRAVENOUS; SUBCUTANEOUS at 17:31

## 2018-07-08 RX ADMIN — Medication 30 MILLIGRAM(S): at 17:31

## 2018-07-08 RX ADMIN — Medication 3 MILLILITER(S): at 16:23

## 2018-07-08 RX ADMIN — Medication 2 SPRAY(S): at 22:06

## 2018-07-08 RX ADMIN — Medication 125 MILLIGRAM(S): at 16:22

## 2018-07-08 RX ADMIN — LISINOPRIL 20 MILLIGRAM(S): 2.5 TABLET ORAL at 21:14

## 2018-07-08 RX ADMIN — ALBUTEROL 2.5 MILLIGRAM(S): 90 AEROSOL, METERED ORAL at 17:32

## 2018-07-08 RX ADMIN — Medication 50 MILLIGRAM(S): at 17:31

## 2018-07-08 RX ADMIN — INSULIN GLARGINE 50 UNIT(S): 100 INJECTION, SOLUTION SUBCUTANEOUS at 21:15

## 2018-07-08 RX ADMIN — Medication 30 MILLIGRAM(S): at 16:57

## 2018-07-08 RX ADMIN — ALBUTEROL 2.5 MILLIGRAM(S): 90 AEROSOL, METERED ORAL at 16:51

## 2018-07-08 RX ADMIN — SODIUM CHLORIDE 1000 MILLILITER(S): 9 INJECTION INTRAMUSCULAR; INTRAVENOUS; SUBCUTANEOUS at 19:21

## 2018-07-08 RX ADMIN — ENOXAPARIN SODIUM 40 MILLIGRAM(S): 100 INJECTION SUBCUTANEOUS at 22:08

## 2018-07-08 RX ADMIN — SODIUM CHLORIDE 1000 MILLILITER(S): 9 INJECTION INTRAMUSCULAR; INTRAVENOUS; SUBCUTANEOUS at 16:22

## 2018-07-08 RX ADMIN — Medication 40 MILLIGRAM(S): at 22:07

## 2018-07-08 RX ADMIN — MONTELUKAST 10 MILLIGRAM(S): 4 TABLET, CHEWABLE ORAL at 21:14

## 2018-07-08 RX ADMIN — CARVEDILOL PHOSPHATE 25 MILLIGRAM(S): 80 CAPSULE, EXTENDED RELEASE ORAL at 21:15

## 2018-07-08 RX ADMIN — Medication 100 GRAM(S): at 18:26

## 2018-07-08 NOTE — H&P ADULT - NSHPOUTPATIENTPROVIDERS_GEN_ALL_CORE
PCP: Idania/ [facility]  Pulmonary: Dr. Lassiter  Cardiology: Dr. Brownlee  Endo: Dr. Perlman

## 2018-07-08 NOTE — H&P ADULT - PROBLEM SELECTOR PLAN 6
Stable - Continue with PPI Stable - home med prn zaroxolyn lasix  consult Dr. Brownlee Stable - home med prn zaroxolyn lasix  Cardiology consulted  does not appear grossly volume overloaded

## 2018-07-08 NOTE — ED PROVIDER NOTE - OBJECTIVE STATEMENT
Pt is a 47 yo female who presents to the ED with a cc of difficulty breathing.  PMHx of asthma, h/o previous CVA, DM, HTN, GERD, migraine HA, MTHFR mutation, obesity, PCOS, prinzmetal angina.  Pt reports that she been intubated several times in the past and required ICU admission previously for her asthma attacks.  She believes that she was last intubated Dec 2016.  Pt reports that she has not been feeling well since last Sunday.  She reports that she was experiencing intermittent fevers T max of 101.8, chills, nausea, sinus pressure, ear pain, throat pain, bilateral chest wall discomfort, SOB, non-productive cough, and decreased appetite.  She followed up with her pulmonologist this past Thursday and was diagnosed with bilateral otitis media, sinusitis, and bronchitis.   She was started on Prednisone 60 mg and Augmentin BID.  Despite taking this along with a cough suppressant, and decongestant pt reports no improvement in symptoms.  She went to an OSH yesterday but after waiting over an hour in the waiting room left.  Denies V/D/C, abd pain, ext numbness or weakness

## 2018-07-08 NOTE — H&P ADULT - PROBLEM SELECTOR PLAN 5
Stable - Continue with ASA and statin Hold home meds  Hypoglycemic protocol, mod ISS, accuchecks qac qhs  Continue with lantus 50units BID and humalog 20units TID  Patient REFUSING carb con dash/tlc diet  F/U HbA1C  consult dr. perlman Hold home meds  Hypoglycemic protocol, mod ISS, accuchecks qac qhs  Continue with lantus 50units BID and humalog 20units TID  Patient REFUSING carb con dash/tlc diet  F/U HbA1C  consult dr. perlman, endocrine

## 2018-07-08 NOTE — H&P ADULT - PROBLEM SELECTOR PLAN 3
Controlled - continue with coreg and lisinopril with hold parameters Stable - Continue with coreg with hold parameters Stable - Continue with coreg with hold parameters  Dr Abreu [facility] group consulted. CE, ECG negative

## 2018-07-08 NOTE — ED PROVIDER NOTE - MEDICAL DECISION MAKING DETAILS
screening septic work up, RVP, strep screen, procalcitonin, EKG, chest x-ray, Soulmedrol, nebs, IVF, Mg, admit

## 2018-07-08 NOTE — H&P ADULT - NEGATIVE NEUROLOGICAL SYMPTOMS
no weakness/no difficulty walking/no loss of consciousness/no generalized seizures/no vertigo/no loss of sensation/no paresthesias/no syncope

## 2018-07-08 NOTE — H&P ADULT - PROBLEM SELECTOR PLAN 10
DVT PPX - Lovenox 40subq  IMPROVE VTE Individual Risk Assessment        RISK                                                          Points  [  ] Previous VTE                                                3  [ 2 ] Thrombophilia                                             2  [  ] Lower limb paralysis                                   2        (unable to hold up >15 seconds)    [  ] Current Cancer                                             2         (within 6 months)  [  ] Immobilization > 24 hrs                              1  [  ] ICU/CCU stay > 24 hours                             1  [  ] Age > 60                                                         1  IMPROVE VTE Score: 2  fall risk, ambulate with assistance, oob with assistance  Insomnia - benadryl prn  Seasonal allergies - continue with loratidine/flonase  vitamins - continue with folic acid and omega 3

## 2018-07-08 NOTE — H&P ADULT - PROBLEM SELECTOR PLAN 7
DVT PPX - Lovenox 40subq  IMPROVE VTE Individual Risk Assessment        RISK                                                          Points  [  ] Previous VTE                                                3  [  ] Thrombophilia                                             2  [  ] Lower limb paralysis                                   2        (unable to hold up >15 seconds)    [  ] Current Cancer                                             2         (within 6 months)  [  ] Immobilization > 24 hrs                              1  [  ] ICU/CCU stay > 24 hours                             1  [  ] Age > 60                                                         1  IMPROVE VTE Score: 0  fall risk, ambulate with assistance, oob with assistance Stable - Continue with ASA and statin

## 2018-07-08 NOTE — H&P ADULT - ATTENDING COMMENTS
Seen and examined by attending MD, agree with above and edited to reflect my findings. Case discussed with patient who is alert and oriented , and voiced understanding and agreement to aforementioned plan. Seen and examined by attending MD, agree with above and edited to reflect my findings.   Offered Xopenex for tachycardia, accepted. Will reeval and contemplate ABG.   Case discussed with patient who is alert and oriented , and voiced understanding and agreement to aforementioned plan. Seen and examined by attending MD, agree with above and edited to reflect my findings.   Offered Xopenex for tachycardia, accepted. She appears acutely ill with high probability of acute decompensation. Refused bipap. Also refused  ABG for reason of "permanent nerve damage from multiple ABGs" Requests venous blood gas and lactate. Will reeval and contemplate ABG. Consult ICU.   Case discussed with patient who is alert and oriented , and voiced understanding and agreement to aforementioned plan.

## 2018-07-08 NOTE — H&P ADULT - NSHPSOCIALHISTORY_GEN_ALL_CORE
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

## 2018-07-08 NOTE — H&P ADULT - PMH
Asthma  intubated in the past  CVA (cerebral vascular accident)    Diabetes mellitus type 2 in obese    Essential hypertension, hypertension with unspecified goal    GERD (gastroesophageal reflux disease)    Migraine    MTHFR (methylene THF reductase) deficiency and homocystinuria    Obesity    Polycystic disease, ovaries    Prinzmetal Angina Asthma  intubated in the past  CVA (cerebral vascular accident)    Diabetes mellitus type 2 in obese    Essential hypertension, hypertension with unspecified goal    Gastric ulcer    GERD (gastroesophageal reflux disease)    HLD (hyperlipidemia)    Migraine    MTHFR (methylene THF reductase) deficiency and homocystinuria    Obesity    Osteoporosis    Polycystic disease, ovaries    Prinzmetal Angina

## 2018-07-08 NOTE — H&P ADULT - HISTORY OF PRESENT ILLNESS
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 Patient has had 21 intubations in the past.     In ED, 48 year old female with PMH of asthma s/p multiple intubations (last intubation 12/2016), right heart failure, DM2, Essential HTN, HTN, MTHFR deficiency and homocysteinuria, Prinzmetal angina, migraines, osteoporosis, and gastric ulcers presents with worsening shortness of breath since yesterday. Patient states she started experiencing URI symptoms about 1 week ago. She reports intermittent fevers T max of 101.8, chills, fatigue, nausea, sinus pressure, lightheadedness, b/l ear pain, throat pain, bilateral chest wall discomfort, SOB, productive cough with yellow sputum, rhinorrhea with yellow sputum, and decreased appetite.  She followed up with her pulmonologist this past Thursday and was diagnosed with bilateral otitis media, sinusitis, and bronchitis.  She was started on Prednisone 60 mg and Augmentin BID.  Despite taking this along with a cough suppressant, decongestant, nsaids, and duonebs/xopenex.  Pt reports no improvement in symptoms.  She went to an OSH yesterday but after waiting over an hour in the waiting room left.  Denies V/D/C, abd pain, ext numbness/weakness, dysuria, hematuria.     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 Patient has had 21 intubations in the past.     In ED, VS: afebrile, , /73,RR 20, sat 95%RA, CBC unremarkable, BUN 9, Cr 0.57, Glu 147, Ca 8.4, alb 3.2, alk phos 153, AST 60, ALT 78, cardiac enzymes neg x1, RVP neg, strep neg, CXR clear lungs, EKG sinus tach @ 101BPM left atrial enlargment. Received 2 NS boluses, albuterol nebs, duoneb, IV benadryl, toradol, Iv solu medrol.

## 2018-07-08 NOTE — H&P ADULT - PROBLEM SELECTOR PLAN 2
Stable - Continue with coreg with hold parameters possible otitis media? - continue augmentin  R/O sinusitis - Ct scan  Supportive care  CXR - neg, RVP - neg  hycodan prn   See plan above possible otitis media? - continue augmentin  R/O sinusitis - Requests CT scan.   Supportive care  CXR - neg, RVP - neg  hycodan prn   See plan above

## 2018-07-08 NOTE — H&P ADULT - ASSESSMENT
48 year old female with PMH of asthma s/p multiple intubations, right heart failure, DM2, Essential HTN, MTHFR deficiency and homocysteinuria, Prinzmetal angina admitted with acute asthma exacerbation R/O sinusitis. 48 year old female with PMH of asthma s/p multiple intubations, right heart failure, DM2, Essential HTN, MTHFR deficiency and homocysteinuria, Prinzmetal angina admitted with acute asthma exacerbation likely 2/2 viral URI with possible b/l otitis media R/O sinusitis.

## 2018-07-08 NOTE — ED PROVIDER NOTE - CARE PLAN
Principal Discharge DX:	Severe asthma with acute exacerbation, unspecified whether persistent  Assessment and plan of treatment:	admit  Secondary Diagnosis:	Shortness of breath

## 2018-07-08 NOTE — H&P ADULT - PROBLEM SELECTOR PLAN 4
Hold home meds  Hypoglycemic protocol, mod ISS, accuchecks qac qhs  Continue with lantus 50units BID and humalog 20units TID  Patient REFUSING carb con dash/tlc diet  F/U HbA1C Controlled - continue with coreg and lisinopril with hold parameters

## 2018-07-08 NOTE — H&P ADULT - NSHPPHYSICALEXAM_GEN_ALL_CORE
Vital Signs Last 24 Hrs  T(C): 37.1 (08 Jul 2018 15:11), Max: 37.1 (08 Jul 2018 15:11)  T(F): 98.8 (08 Jul 2018 15:11), Max: 98.8 (08 Jul 2018 15:11)  HR: 117 (08 Jul 2018 15:11) (117 - 117)  BP: 134/83 (08 Jul 2018 15:11) (134/83 - 134/83)  RR: 18 (08 Jul 2018 15:11) (18 - 18)  SpO2: 94% (08 Jul 2018 15:11) (94% - 94%)  Physical Exam:  General: Well developed, well nourished, NAD  HEENT: NCAT, PERRLA, EOMI bl, moist mucous membranes   Neck: Supple, nontender, no mass  Neurology: A&Ox3, nonfocal, CN II-XII grossly intact, sensation intact, no gait abnormalities   Respiratory: CTA B/L, No W/R/R  CV: RRR, +S1/S2, no murmurs, rubs or gallops  Abdominal: Soft, NT, ND +BSx4  Extremities: No C/C/E, + peripheral pulses  MSK: Normal ROM, no joint erythema or warmth, no joint swelling   Skin: warm, dry, normal color, no rash or abnormal lesions Vital Signs Last 24 Hrs  T(C): 37.1 (08 Jul 2018 15:11), Max: 37.1 (08 Jul 2018 15:11)  T(F): 98.8 (08 Jul 2018 15:11), Max: 98.8 (08 Jul 2018 15:11)  HR: 117 (08 Jul 2018 15:11) (117 - 117)  BP: 134/83 (08 Jul 2018 15:11) (134/83 - 134/83)  RR: 18 (08 Jul 2018 15:11) (18 - 18)  SpO2: 94% (08 Jul 2018 15:11) (94% - 94%)  Physical Exam:  General: no acute distress, morbidly obese  HEENT: NCAT, PERRLA, EOMI bl, dry mucous membranes, Ears - b/l tm intact with erythema and exudates R> L, Throat - erythema, patent, no exudates, uvula midline  Neck: Supple, nontender, no mass  Neurology: A&Ox3, nonfocal, CN II-XII grossly intact, sensation intact, no gait abnormalities   Respiratory: scattered wheezing throughout  CV: irreg rate, reg rhythm, +S1/S2, no murmurs, rubs or gallops  Abdominal: Soft, NT, ND +BSx4  Extremities: b/l LE edema (+) nonpitting (chronic)  MSK: Normal ROM, no joint erythema or warmth, no joint swelling   Skin: warm, dry, normal color, no rash or abnormal lesions

## 2018-07-08 NOTE — H&P ADULT - PROBLEM SELECTOR PLAN 1
Acute asthma exacerbation   Admit to tele  Continue with Duonebs q6h, Proventil q4h  IV solu-medrol 40mg q8h  Continue with singulair and spiriva  Dr. Lassiter consulted Acute asthma exacerbation likely 2/2 viral syndrome   Admit to tele  Continue with Duonebs q6h, Proventil q4h  IV solu-medrol 40mg q8h  Continue with singulair and spiriva  hycodan prn cough  pain control  Dr. Lassiter consulted Acute asthma exacerbation likely 2/2 viral syndrome   Admit to tele  Continue with Duonebs q6h, Proventil q4h( patient requesting duonebs Q1H)  IV solu-medrol 40mg q8h  Continue with singulair and spiriva  hycodan prn cough  pain control  Dr. Lassiter consulted

## 2018-07-08 NOTE — CHART NOTE - NSCHARTNOTEFT_GEN_A_CORE
consult dictated    Impression:    Asthma with exacerbation  Hx of multiple intubations for asthma  Diabetes  Obesity  Hx Prinzmetal Angina    Plan:    IV Solumedrol  IV antibiotics  CT of sinuses  Duoneb

## 2018-07-08 NOTE — H&P ADULT - NEGATIVE GASTROINTESTINAL SYMPTOMS
no hematochezia/no change in bowel habits/no vomiting/no flatulence/no abdominal pain/no diarrhea/no melena

## 2018-07-09 DIAGNOSIS — E11.65 TYPE 2 DIABETES MELLITUS WITH HYPERGLYCEMIA: ICD-10-CM

## 2018-07-09 LAB
ANION GAP SERPL CALC-SCNC: 12 MMOL/L — SIGNIFICANT CHANGE UP (ref 5–17)
BASE EXCESS BLDA CALC-SCNC: -5.4 MMOL/L — LOW (ref -2–2)
BASE EXCESS BLDV CALC-SCNC: -4.1 MMOL/L — LOW (ref -2–2)
BLOOD GAS COMMENTS ARTERIAL: SIGNIFICANT CHANGE UP
BLOOD GAS COMMENTS ARTERIAL: SIGNIFICANT CHANGE UP
BLOOD GAS COMMENTS, VENOUS: SIGNIFICANT CHANGE UP
BUN SERPL-MCNC: 21 MG/DL — SIGNIFICANT CHANGE UP (ref 7–23)
CALCIUM SERPL-MCNC: 8.5 MG/DL — SIGNIFICANT CHANGE UP (ref 8.5–10.1)
CHLORIDE SERPL-SCNC: 106 MMOL/L — SIGNIFICANT CHANGE UP (ref 96–108)
CO2 SERPL-SCNC: 19 MMOL/L — LOW (ref 22–31)
CREAT SERPL-MCNC: 0.93 MG/DL — SIGNIFICANT CHANGE UP (ref 0.5–1.3)
GLUCOSE SERPL-MCNC: 357 MG/DL — HIGH (ref 70–99)
HBA1C BLD-MCNC: 8.2 % — HIGH (ref 4–5.6)
HCO3 BLDA-SCNC: 20 MMOL/L — LOW (ref 23–27)
HCO3 BLDV-SCNC: 20 MMOL/L — LOW (ref 21–29)
HCT VFR BLD CALC: 41 % — SIGNIFICANT CHANGE UP (ref 34.5–45)
HGB BLD-MCNC: 13.6 G/DL — SIGNIFICANT CHANGE UP (ref 11.5–15.5)
HOROWITZ INDEX BLDV+IHG-RTO: 21 — SIGNIFICANT CHANGE UP
LACTATE SERPL-SCNC: 2.5 MMOL/L — HIGH (ref 0.7–2)
LACTATE SERPL-SCNC: 3.6 MMOL/L — HIGH (ref 0.7–2)
MCHC RBC-ENTMCNC: 27.9 PG — SIGNIFICANT CHANGE UP (ref 27–34)
MCHC RBC-ENTMCNC: 33.2 GM/DL — SIGNIFICANT CHANGE UP (ref 32–36)
MCV RBC AUTO: 84.2 FL — SIGNIFICANT CHANGE UP (ref 80–100)
NRBC # BLD: 0 /100 WBCS — SIGNIFICANT CHANGE UP (ref 0–0)
PCO2 BLDA: 36 MMHG — SIGNIFICANT CHANGE UP (ref 32–46)
PCO2 BLDV: 41 MMHG — SIGNIFICANT CHANGE UP (ref 35–50)
PH BLDA: 7.34 — LOW (ref 7.35–7.45)
PH BLDV: 7.33 — LOW (ref 7.35–7.45)
PLATELET # BLD AUTO: 333 K/UL — SIGNIFICANT CHANGE UP (ref 150–400)
PO2 BLDA: 89 MMHG — SIGNIFICANT CHANGE UP (ref 74–108)
PO2 BLDV: 33 MMHG — SIGNIFICANT CHANGE UP (ref 25–45)
POTASSIUM SERPL-MCNC: 4.7 MMOL/L — SIGNIFICANT CHANGE UP (ref 3.5–5.3)
POTASSIUM SERPL-SCNC: 4.7 MMOL/L — SIGNIFICANT CHANGE UP (ref 3.5–5.3)
RBC # BLD: 4.87 M/UL — SIGNIFICANT CHANGE UP (ref 3.8–5.2)
RBC # FLD: 13.8 % — SIGNIFICANT CHANGE UP (ref 10.3–14.5)
SAO2 % BLDA: 96 % — SIGNIFICANT CHANGE UP (ref 92–96)
SAO2 % BLDV: 57 % — LOW (ref 67–88)
SODIUM SERPL-SCNC: 137 MMOL/L — SIGNIFICANT CHANGE UP (ref 135–145)
TROPONIN I SERPL-MCNC: <.015 NG/ML — SIGNIFICANT CHANGE UP (ref 0.01–0.04)
WBC # BLD: 11.64 K/UL — HIGH (ref 3.8–10.5)
WBC # FLD AUTO: 11.64 K/UL — HIGH (ref 3.8–10.5)

## 2018-07-09 PROCEDURE — 93010 ELECTROCARDIOGRAM REPORT: CPT

## 2018-07-09 PROCEDURE — 99291 CRITICAL CARE FIRST HOUR: CPT

## 2018-07-09 PROCEDURE — 70486 CT MAXILLOFACIAL W/O DYE: CPT | Mod: 26

## 2018-07-09 PROCEDURE — 99233 SBSQ HOSP IP/OBS HIGH 50: CPT

## 2018-07-09 RX ORDER — OXYCODONE AND ACETAMINOPHEN 5; 325 MG/1; MG/1
1 TABLET ORAL EVERY 4 HOURS
Qty: 0 | Refills: 0 | Status: DISCONTINUED | OUTPATIENT
Start: 2018-07-09 | End: 2018-07-12

## 2018-07-09 RX ORDER — ONDANSETRON 8 MG/1
4 TABLET, FILM COATED ORAL EVERY 6 HOURS
Qty: 0 | Refills: 0 | Status: DISCONTINUED | OUTPATIENT
Start: 2018-07-09 | End: 2018-07-12

## 2018-07-09 RX ORDER — MAGNESIUM SULFATE 500 MG/ML
1 VIAL (ML) INJECTION ONCE
Qty: 0 | Refills: 0 | Status: COMPLETED | OUTPATIENT
Start: 2018-07-09 | End: 2018-07-09

## 2018-07-09 RX ORDER — NITROGLYCERIN 6.5 MG
0.4 CAPSULE, EXTENDED RELEASE ORAL ONCE
Qty: 0 | Refills: 0 | Status: COMPLETED | OUTPATIENT
Start: 2018-07-09 | End: 2018-07-09

## 2018-07-09 RX ORDER — GENTAMICIN SULFATE 3 MG/ML
1 SOLUTION/ DROPS OPHTHALMIC EVERY 4 HOURS
Qty: 0 | Refills: 0 | Status: DISCONTINUED | OUTPATIENT
Start: 2018-07-09 | End: 2018-07-12

## 2018-07-09 RX ORDER — ALBUTEROL 90 UG/1
2.5 AEROSOL, METERED ORAL
Qty: 0 | Refills: 0 | Status: DISCONTINUED | OUTPATIENT
Start: 2018-07-09 | End: 2018-07-12

## 2018-07-09 RX ORDER — ALBUTEROL 90 UG/1
2.5 AEROSOL, METERED ORAL
Qty: 0 | Refills: 0 | Status: DISCONTINUED | OUTPATIENT
Start: 2018-07-09 | End: 2018-07-09

## 2018-07-09 RX ORDER — BUDESONIDE AND FORMOTEROL FUMARATE DIHYDRATE 160; 4.5 UG/1; UG/1
2 AEROSOL RESPIRATORY (INHALATION)
Qty: 0 | Refills: 0 | Status: DISCONTINUED | OUTPATIENT
Start: 2018-07-09 | End: 2018-07-10

## 2018-07-09 RX ORDER — ENOXAPARIN SODIUM 100 MG/ML
40 INJECTION SUBCUTANEOUS EVERY 12 HOURS
Qty: 0 | Refills: 0 | Status: DISCONTINUED | OUTPATIENT
Start: 2018-07-09 | End: 2018-07-12

## 2018-07-09 RX ORDER — LEVALBUTEROL 1.25 MG/.5ML
0.63 SOLUTION, CONCENTRATE RESPIRATORY (INHALATION) EVERY 4 HOURS
Qty: 0 | Refills: 0 | Status: DISCONTINUED | OUTPATIENT
Start: 2018-07-09 | End: 2018-07-09

## 2018-07-09 RX ADMIN — Medication 62.5 MILLIMOLE(S): at 10:48

## 2018-07-09 RX ADMIN — INSULIN GLARGINE 50 UNIT(S): 100 INJECTION, SOLUTION SUBCUTANEOUS at 22:17

## 2018-07-09 RX ADMIN — Medication 20 UNIT(S): at 17:22

## 2018-07-09 RX ADMIN — Medication 40 MILLIGRAM(S): at 13:14

## 2018-07-09 RX ADMIN — ALBUTEROL 2.5 MILLIGRAM(S): 90 AEROSOL, METERED ORAL at 19:56

## 2018-07-09 RX ADMIN — ALBUTEROL 2.5 MILLIGRAM(S): 90 AEROSOL, METERED ORAL at 17:03

## 2018-07-09 RX ADMIN — Medication 4: at 13:01

## 2018-07-09 RX ADMIN — INSULIN GLARGINE 50 UNIT(S): 100 INJECTION, SOLUTION SUBCUTANEOUS at 07:58

## 2018-07-09 RX ADMIN — ALBUTEROL 2.5 MILLIGRAM(S): 90 AEROSOL, METERED ORAL at 23:23

## 2018-07-09 RX ADMIN — Medication 8: at 07:58

## 2018-07-09 RX ADMIN — Medication 4 MILLIGRAM(S): at 11:41

## 2018-07-09 RX ADMIN — Medication 100 MILLIGRAM(S): at 23:30

## 2018-07-09 RX ADMIN — LORATADINE 10 MILLIGRAM(S): 10 TABLET ORAL at 13:14

## 2018-07-09 RX ADMIN — SIMVASTATIN 20 MILLIGRAM(S): 20 TABLET, FILM COATED ORAL at 22:14

## 2018-07-09 RX ADMIN — Medication 100 GRAM(S): at 08:28

## 2018-07-09 RX ADMIN — ONDANSETRON 4 MILLIGRAM(S): 8 TABLET, FILM COATED ORAL at 04:16

## 2018-07-09 RX ADMIN — PANTOPRAZOLE SODIUM 40 MILLIGRAM(S): 20 TABLET, DELAYED RELEASE ORAL at 05:46

## 2018-07-09 RX ADMIN — Medication 20 UNIT(S): at 07:58

## 2018-07-09 RX ADMIN — Medication 8: at 17:22

## 2018-07-09 RX ADMIN — GENTAMICIN SULFATE 1 DROP(S): 3 SOLUTION/ DROPS OPHTHALMIC at 22:38

## 2018-07-09 RX ADMIN — Medication 40 MILLIGRAM(S): at 22:14

## 2018-07-09 RX ADMIN — ALBUTEROL 2.5 MILLIGRAM(S): 90 AEROSOL, METERED ORAL at 11:02

## 2018-07-09 RX ADMIN — ALBUTEROL 2.5 MILLIGRAM(S): 90 AEROSOL, METERED ORAL at 04:36

## 2018-07-09 RX ADMIN — LEVALBUTEROL 0.63 MILLIGRAM(S): 1.25 SOLUTION, CONCENTRATE RESPIRATORY (INHALATION) at 03:00

## 2018-07-09 RX ADMIN — ENOXAPARIN SODIUM 40 MILLIGRAM(S): 100 INJECTION SUBCUTANEOUS at 11:43

## 2018-07-09 RX ADMIN — ALBUTEROL 2.5 MILLIGRAM(S): 90 AEROSOL, METERED ORAL at 09:16

## 2018-07-09 RX ADMIN — ALBUTEROL 2.5 MILLIGRAM(S): 90 AEROSOL, METERED ORAL at 15:25

## 2018-07-09 RX ADMIN — ALBUTEROL 2.5 MILLIGRAM(S): 90 AEROSOL, METERED ORAL at 07:31

## 2018-07-09 RX ADMIN — Medication 1 DROP(S): at 22:38

## 2018-07-09 RX ADMIN — OXYCODONE AND ACETAMINOPHEN 1 TABLET(S): 5; 325 TABLET ORAL at 14:48

## 2018-07-09 RX ADMIN — OXYCODONE AND ACETAMINOPHEN 1 TABLET(S): 5; 325 TABLET ORAL at 15:15

## 2018-07-09 RX ADMIN — Medication 8: at 22:16

## 2018-07-09 RX ADMIN — Medication 81 MILLIGRAM(S): at 11:42

## 2018-07-09 RX ADMIN — ENOXAPARIN SODIUM 40 MILLIGRAM(S): 100 INJECTION SUBCUTANEOUS at 22:14

## 2018-07-09 RX ADMIN — CARVEDILOL PHOSPHATE 25 MILLIGRAM(S): 80 CAPSULE, EXTENDED RELEASE ORAL at 05:46

## 2018-07-09 RX ADMIN — Medication 1 TABLET(S): at 19:23

## 2018-07-09 RX ADMIN — Medication 40 MILLIGRAM(S): at 05:46

## 2018-07-09 RX ADMIN — Medication 0.4 MILLIGRAM(S): at 06:23

## 2018-07-09 RX ADMIN — OXYCODONE AND ACETAMINOPHEN 1 TABLET(S): 5; 325 TABLET ORAL at 22:14

## 2018-07-09 RX ADMIN — OXYCODONE AND ACETAMINOPHEN 1 TABLET(S): 5; 325 TABLET ORAL at 23:16

## 2018-07-09 RX ADMIN — Medication 2 GRAM(S): at 11:42

## 2018-07-09 RX ADMIN — MONTELUKAST 10 MILLIGRAM(S): 4 TABLET, CHEWABLE ORAL at 11:41

## 2018-07-09 RX ADMIN — Medication 2 SPRAY(S): at 11:41

## 2018-07-09 NOTE — PROGRESS NOTE ADULT - SUBJECTIVE AND OBJECTIVE BOX
Patient is a 48y old  Female who presents with a chief complaint of Worsening shortness of breath (08 Jul 2018 20:28)      INTERVAL HPI: Pt seen and examined. Able to speak in short sentences, states breathing is slightly improved but has severe asthma and has been intubated in past. Deneis any other acute complaints other than cough and severe work of breathing with cough fits.     OVERNIGHT EVENTS: none noted  T(F): 98.5 (07-09-18 @ 20:30), Max: 99.2 (07-08-18 @ 23:26)  HR: 105 (07-09-18 @ 21:00) (93 - 118)  BP: 107/56 (07-09-18 @ 21:00) (95/50 - 141/60)  RR: 30 (07-09-18 @ 21:00) (19 - 44)  SpO2: 91% (07-09-18 @ 21:00) (90% - 99%)  I&O's Summary    08 Jul 2018 07:01  -  09 Jul 2018 07:00  --------------------------------------------------------  IN: 120 mL / OUT: 0 mL / NET: 120 mL    09 Jul 2018 07:01  -  09 Jul 2018 22:22  --------------------------------------------------------  IN: 730 mL / OUT: 500 mL / NET: 230 mL        REVIEW OF SYSTEMS: 12 systems negative other than that stqated in hpi    PHYSICAL EXAM:  GENERAL: acute resp distress, chronically ill appearing, obese  HEAD:  Atraumatic, Normocephalic  EYES: EOMI, PERRLA, conjunctiva and sclera clear  ENMT: No tonsillar erythema, exudates, or enlargement; Moist mucous membranes, Good dentition, No lesions  NECK: Supple, No JVD  NERVOUS SYSTEM:  Alert & Oriented X3, Good concentration; Motor Strength 5/5 B/L upper and lower extremities  CHEST/LUNG: coarse sounds and audible wheezes with tahcypnea in all lung fields  HEART: Regular rate and rhythm; No murmurs, rubs, or gallops  ABDOMEN: Soft, Nontender, Nondistended; Bowel sounds present  EXTREMITIES:  2+ Peripheral Pulses, No clubbing, cyanosis, or edema  SKIN: No rashes or lesions    LABS:                        13.6   11.64 )-----------( 333      ( 09 Jul 2018 06:41 )             41.0     07-09    137  |  106  |  21  ----------------------------<  357<H>  4.7   |  19<L>  |  0.93    Ca    8.5      09 Jul 2018 06:41  Phos  2.4     07-09  Mg     2.2     07-09    TPro  8.2  /  Alb  3.2<L>  /  TBili  0.4  /  DBili  x   /  AST  60<H>  /  ALT  78  /  AlkPhos  153<H>  07-08        CAPILLARY BLOOD GLUCOSE      POCT Blood Glucose.: 324 mg/dL (09 Jul 2018 22:10)  POCT Blood Glucose.: 318 mg/dL (09 Jul 2018 16:56)  POCT Blood Glucose.: 228 mg/dL (09 Jul 2018 11:51)  POCT Blood Glucose.: 307 mg/dL (09 Jul 2018 07:51)    ABG - ( 09 Jul 2018 09:02 )  pH, Arterial: 7.34  pH, Blood: x     /  pCO2: 36    /  pO2: 89    / HCO3: 20    / Base Excess: -5.4  /  SaO2: 96                07-08 @ 21:45   No Strep pyogenes (Group A) isolated  --  --  07-08 @ 17:26   No growth to date.  --  --          MEDICATIONS  (STANDING):  ALBUTerol    0.083% 2.5 milliGRAM(s) Nebulizer every 2 hours  amoxicillin  875 milliGRAM(s)/clavulanate 1 Tablet(s) Oral two times a day  aspirin enteric coated 81 milliGRAM(s) Oral daily  buDESOnide  80 MICROgram(s)/formoterol 4.5 MICROgram(s) Inhaler 2 Puff(s) Inhalation two times a day  carvedilol 25 milliGRAM(s) Oral every 12 hours  dextrose 5%. 1000 milliLiter(s) (50 mL/Hr) IV Continuous <Continuous>  dextrose 50% Injectable 12.5 Gram(s) IV Push once  dextrose 50% Injectable 25 Gram(s) IV Push once  dextrose 50% Injectable 25 Gram(s) IV Push once  enoxaparin Injectable 40 milliGRAM(s) SubCutaneous every 12 hours  fluticasone propionate 50 MICROgram(s)/spray Nasal Spray 2 Spray(s) Both Nostrils daily  folic acid 4 milliGRAM(s) Oral daily  gentamicin 0.3% Ophthalmic Solution 1 Drop(s) Both EYES every 4 hours  insulin glargine Injectable (LANTUS) 50 Unit(s) SubCutaneous at bedtime  insulin glargine Injectable (LANTUS) 50 Unit(s) SubCutaneous every morning  insulin lispro (HumaLOG) corrective regimen sliding scale   SubCutaneous Before meals and at bedtime  insulin lispro Injectable (HumaLOG) 20 Unit(s) SubCutaneous three times a day before meals  lisinopril 20 milliGRAM(s) Oral daily  loratadine 10 milliGRAM(s) Oral daily  methylPREDNISolone sodium succinate Injectable 40 milliGRAM(s) IV Push every 8 hours  montelukast 10 milliGRAM(s) Oral daily  omega-3-Acid Ethyl Esters 2 Gram(s) Oral daily  pantoprazole    Tablet 40 milliGRAM(s) Oral before breakfast  simvastatin 20 milliGRAM(s) Oral at bedtime  tiotropium 18 MICROgram(s) Capsule 1 Capsule(s) Inhalation daily    MEDICATIONS  (PRN):  artificial  tears Solution 1 Drop(s) Both EYES every 4 hours PRN Dry Eyes  dextrose 40% Gel 15 Gram(s) Oral once PRN Blood Glucose LESS THAN 70 milliGRAM(s)/deciliter  diphenhydrAMINE   Capsule 100 milliGRAM(s) Oral at bedtime PRN Insomnia  diphenhydrAMINE   Capsule 50 milliGRAM(s) Oral once PRN Insomnia  glucagon  Injectable 1 milliGRAM(s) IntraMuscular once PRN Glucose LESS THAN 70 milligrams/deciliter  HYDROcodone/homatropine Syrup 5 milliLiter(s) Oral every 6 hours PRN Cough  ondansetron Injectable 4 milliGRAM(s) IV Push every 6 hours PRN Nausea and/or Vomiting  oxyCODONE    5 mG/acetaminophen 325 mG 1 Tablet(s) Oral every 4 hours PRN Moderate Pain (4 - 6)

## 2018-07-09 NOTE — PROGRESS NOTE ADULT - ASSESSMENT
48 year old female with PMH of asthma s/p multiple intubations (last intubation 12/2016), right heart failure, DM2, Essential HTN, HTN, MTHFR deficiency and homocysteinuria, Prinzmetal angina, migraines, osteoporosis, and gastric ulcers presents with worsening shortness of breath since yesterday.    Neurology:  Allergic to Acetaminophen IV, pain managed with Percocet PRN.   Cardiology:  Patient c/o chest pain, Cardio input appreciated. Hx of prinzmetal angina and cardiac cath. EKG unrevealing. Troponin' s trended, negative thus far.  Monitor electrolytes and keep K> 4, Mg >2    Continue simvastatin, omega 3 for HLD. Continue lisinopril, and carvedilol for HTN. On low dose ASA daily     Respiratory:  Pulm consult Dr. Lassiter appreciated. Patient receiving albuterol treatment, budesonide, Spiriva, diphenhydramine, loratadine, Flonase. Currently stable on nasal cannula. Cpap/ Bipap if patient becomes hypoxic       GI:  Denies current nausea and vomiting. Zofran PRN for nausea. Protonix for GI ppx   Renal/  Lactate increased overnight, trending down. Monitor.   Heme:  Elevated WBC of 11.64. liklely 2/2 steroids, continue to monitor. Negative CXR. Am labs ordered  ID:  History of otitis media B/L restart Augmentin for remaining 5 day course. CT sinuses reveal mild chronic inflammatory mucosal disease of paranasal sinuses.    Endo:  Endo consult appreciated. Daily FS and moderated ISS. On hypoglycemia protocol. Patient will have victoza brought from home Continue Humalog 20 units pre meal and 50 units Lantus BID. Goal bg 140-180 in icu setting.    Prophylaxis:  Enoxaparin and Aspirin    Lines:   -None    Code Status:  Full

## 2018-07-09 NOTE — CONSULT NOTE ADULT - SUBJECTIVE AND OBJECTIVE BOX
Febrile 100.6 [facility]         Sachin Abreu, Greta, Kelli, Kem, Gino, Kavin        [phone] (office)    CHIEF COMPLAINT: Patient is a 48y old  Female who presents with a chief complaint of Worsening shortness of breath (2018 20:28)      HPI:  48 year old female with PMH of asthma s/p multiple intubations (last intubation 2016), right heart failure, DM2, Essential HTN, HTN, MTHFR deficiency and homocysteinuria, Prinzmetal angina, migraines, osteoporosis, and gastric ulcers presents with worsening shortness of breath. Patient states she started experiencing URI symptoms about 1 week ago. She reports intermittent fevers T max of 101.8, chills, fatigue, nausea, sinus pressure, lightheadedness, b/l ear pain, throat pain, bilateral chest wall discomfort, SOB, productive cough with yellow sputum, rhinorrhea with yellow sputum, and decreased appetite.  She followed up with her pulmonologist this past Thursday and was diagnosed with bilateral otitis media, sinusitis, and bronchitis.  She was started on Prednisone 60 mg and Augmentin BID.  Despite taking this along with a cough suppressant, decongestant, nsaids, and duonebs/xopenex, she did not have any improvement in symptoms.  She went to an OSH yesterday but after waiting over an hour in the waiting room left.  Denies V/D/C, abd pain, ext numbness/weakness, dysuria, hematuria.     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 Patient has had 21 intubations in the past.     In ED, VS: afebrile, , /73,RR 20, sat 95%RA, CBC unremarkable, BUN 9, Cr 0.57, Glu 147, Ca 8.4, alb 3.2, alk phos 153, AST 60, ALT 78, cardiac enzymes neg x1, RVP neg, strep neg, CXR clear lungs, EKG sinus tach @ 101BPM left atrial enlargment. Received 2 NS boluses, albuterol nebs, duoneb, IV benadryl, toradol, Iv solu medrol. (2018 20:28)    In the ICU she reported chest discomofort, which is ongoing.  It is moderate in intensity, located in the mid chest with some radiation to the left shoulder and up into the neck/jaw.  She associated this with her prior anginal episode, for which she usually will take ntg.  She has not taken a ntg in over a year.  Ekg has revealed no ischemic changes.      PAST MEDICAL & SURGICAL HISTORY:  Osteoporosis  Gastric ulcer  HLD (hyperlipidemia)  Essential hypertension, hypertension with unspecified goal  Diabetes mellitus type 2 in obese  Migraine  MTHFR (methylene THF reductase) deficiency and homocystinuria  Polycystic disease, ovaries  CVA (cerebral vascular accident)  GERD (gastroesophageal reflux disease)  Obesity  Prinzmetal Angina  Asthma: intubated in the past  H/O ovarian cystectomy  Salpingo-oophoritis: oophrectomy  History of tonsillectomy  History of Cholecystectomy  Status Post Nissen Fundoplication (with Gastrostomy Tube Placement)      SOCIAL HISTORY: No active tobacco, alcohol or illicit drug use    FAMILY HISTORY:  No pertinent family history in first degree relatives   No pertinent family history of CAD         MEDICATIONS  (STANDING):  ALBUTerol    0.083% 2.5 milliGRAM(s) Nebulizer every 2 hours  aspirin enteric coated 81 milliGRAM(s) Oral daily  carvedilol 25 milliGRAM(s) Oral every 12 hours  dextrose 5%. 1000 milliLiter(s) (50 mL/Hr) IV Continuous <Continuous>  dextrose 50% Injectable 12.5 Gram(s) IV Push once  dextrose 50% Injectable 25 Gram(s) IV Push once  dextrose 50% Injectable 25 Gram(s) IV Push once  enoxaparin Injectable 40 milliGRAM(s) SubCutaneous daily  fluticasone propionate 50 MICROgram(s)/spray Nasal Spray 2 Spray(s) Both Nostrils daily  folic acid 4 milliGRAM(s) Oral daily  insulin glargine Injectable (LANTUS) 50 Unit(s) SubCutaneous at bedtime  insulin glargine Injectable (LANTUS) 50 Unit(s) SubCutaneous every morning  insulin lispro (HumaLOG) corrective regimen sliding scale   SubCutaneous Before meals and at bedtime  insulin lispro Injectable (HumaLOG) 20 Unit(s) SubCutaneous three times a day before meals  lisinopril 20 milliGRAM(s) Oral daily  loratadine 10 milliGRAM(s) Oral daily  methylPREDNISolone sodium succinate Injectable 40 milliGRAM(s) IV Push every 8 hours  montelukast 10 milliGRAM(s) Oral daily  omega-3-Acid Ethyl Esters 2 Gram(s) Oral daily  pantoprazole    Tablet 40 milliGRAM(s) Oral before breakfast  simvastatin 20 milliGRAM(s) Oral at bedtime  tiotropium 18 MICROgram(s) Capsule 1 Capsule(s) Inhalation daily    MEDICATIONS  (PRN):  dextrose 40% Gel 15 Gram(s) Oral once PRN Blood Glucose LESS THAN 70 milliGRAM(s)/deciliter  diphenhydrAMINE   Capsule 100 milliGRAM(s) Oral at bedtime PRN Insomnia  diphenhydrAMINE   Capsule 50 milliGRAM(s) Oral once PRN Insomnia  glucagon  Injectable 1 milliGRAM(s) IntraMuscular once PRN Glucose LESS THAN 70 milligrams/deciliter  HYDROcodone/homatropine Syrup 10 milliLiter(s) Oral daily PRN Cough  ondansetron Injectable 4 milliGRAM(s) IV Push every 6 hours PRN Nausea and/or Vomiting      Allergies    Depakote (Unknown)  Diprivan (Nausea)  gadolinium containing compounds (Angioedema)  latex (Unknown)  levofloxacin (Anaphylaxis (Mod to Severe))  nutrasweet (Unknown)  Ofirmev (Anaphylaxis)  propofol (Unknown)    Intolerances        REVIEW OF SYSTEMS: Is negative for eye, ENT, GI, , allergic, dermatologic, musculoskeletal and neurologic, except as described above.    VITAL SIGNS:   Vital Signs Last 24 Hrs  T(C): 37.1 (2018 04:05), Max: 37.3 (2018 23:26)  T(F): 98.8 (2018 04:05), Max: 99.2 (2018 23:26)  HR: 111 (2018 05:00) (99 - 118)  BP: 127/58 (2018 05:00) (106/72 - 142/82)  BP(mean): 84 (2018 05:00) (84 - 98)  RR: 25 (2018 05:00) (18 - 39)  SpO2: 93% (2018 05:00) (93% - 99%)    I&O's Summary      PHYSICAL EXAM:    Constitutional: NAD, awake and alert, obese  Eyes:  EOMI, no oral cyanosis, conjunctivae clear, anicteric.  Pulmonary: labored, sherri wheezing with poor air movement  Cardiovascular:  regular S1 and S2. No murmur.  No rubs, gallops or clicks  Gastrointestinal: Bowel Sounds present, soft, nontender.   Lymph: No peripheral edema.   Neurological: Alert, strength and sensitivity are grossly intact  Skin: No obvious lesions/rashes.   Psych:  Mood & affect appropriate .    LABS: All Labs Reviewed:                        14.9   9.86  )-----------( 329      ( 2018 16:20 )             43.8     2018 16:20    138    |  104    |  9      ----------------------------<  149    4.1     |  25     |  0.57     Ca    8.4        2018 16:20    TPro  8.2    /  Alb  3.2    /  TBili  0.4    /  DBili  x      /  AST  60     /  ALT  78     /  AlkPhos  153    2018 16:20          Blood Culture:         RADIOLOGY:  < from: Cardiac Cath Lab (13 @ 12:11) >    [facility]  Department of Cardiology  [address] [place] [address]  [phone]  Cath Lab Report -- Comprehensive Report  Patient: ADELA LEDESMA  Study date: 2013  Account number: [id number]  MR number: [id number]  : 1970  Gender: Female  Race: W  Case Physician(s):  Alan Salazar M.D.  Fellow:  Rica Sifuentes M.D.  Referring Physician:  Mayank Pereira M.D.  INDICATIONS: SOB, CP transferred from outside hosptial to rule out CAD.  HISTORY:Obese, pulmonary disease, HTN, hyperlipidemia  PROCEDURE:  --  Right heart catheterization.  --  Left heart catheterization with ventriculography.  --  Left coronary angiography.  --  Right coronary angiography.  TECHNIQUE: The risks and alternativesof the procedures and conscious  sedation were explained to the patient and informed consent was obtained.  Cardiac catheterization performed electively. Cath lab room 6.  Local anesthetic given. Left femoral artery access. Left femoral vein  access.Right heart catheterization. The procedure was performed utilizing  a catheter. Left heart catheterization. Ventriculography was performed.  Left coronary artery angiography. The vessel was injected utilizing a  catheter. Right coronary artery angiography. The vessel was injected  utilizing a catheter. RADIATION EXPOSURE: 3.9 min.  CONTRAST GIVEN: Omnipaque 40 ml.  MEDICATIONS GIVEN: Midazolam, 2 mg, IV. Fentanyl, 50 mcg, IV. Fentanyl, 50  mcg, IV. Nitroglycerin, 200 mcg, IA. Nitroglycerin, 200 mcg, IA.  VENTRICLES: EF estimated was 60 %.  CORONARY VESSELS: The coronary circulation is right dominant.  LM:   --  LM: Normal.  LAD:   --  LAD: Normal. The vessel was large sized.  CX:   --  Circumflex: Normal. The vessel was large sized.  RCA:   --  RCA: Normal. The vessel was large sized.  COMPLICATIONS: There were no complications.  DIAGNOSTIC RECOMMENDATIONS: Normal coronary angiogram. Normal LV function.  Mild PH with normal PVRI which responds to NTG. Transfer to medical  service  INTERVENTIONAL RECOMMENDATIONS: Normal coronary angiogram. Normal LV  function. Mild PH with normal PVRI which responds to NTG. Transfer to  medical service  Prepared and signed by  Alan Salazar M.D.  Signed 2013 09:20:52  HEMODYNAMIC TABLES  Pressures:  Baseline/ Room Air  Pressures:  - HR: 91  Pressures:  - Rhythm:  Pressures:  -- Aortic Pressure (S/D/M): 132/85/108  Pressures:  -- Left Ventricle (s/edp): 131/36/--  Pressures:  -- Pulmonary Artery (S/D/M): 37/22/28  Pressures:  -- Pulmonary Capillary Wedge: 24/23/20  Pressures:  -- Right Atrium (a/v/M): 21/18/16  Pressures:  -- Right Ventricle (s/edp): 40/21/--  O2 Sats:  Baseline/ Room Air  O2 Sats:  - HR: 91  O2 Sats:  - Rhythm:  O2 Sats:  -- AO: 10.4/91.9/13  O2 Sats:  -- PA: 10.4/68.1/9.63  Outputs:  Baseline/ Room Air  Outputs:  -- CALCULATIONS[age]  Outputs:  -- CALCULATIONS: Body Surface Area: 2.30  Outputs:  -- CALCULATIONS: Height in cm: 163.00  Outputs:  -- CALCULATIONS: Sex: Female  Outputs:  -- CALCULATIONS: Weight in k.40  Outputs:  -- OUTPUTS: CO by Kayode: 9.15  Outputs:  -- OUTPUTS: Kayode cardiac index: 3.98  Outputs:  -- OUTPUTS: O2 consumption: 308.00  Outputs:  -- OUTPUTS: Vo2 Indexed: 133.83  Outputs:  -- RESISTANCES: Left ventricular stroke work: 112.89  Outputs:  -- RESISTANCES: Left Ventricular Stroke Work index: 49.05  Outputs:  -- RESISTANCES: Pulmonary vascular index (dsc): 160.94  Outputs:  -- RESISTANCES: Pulmonary vascular index (Wood Units): 2.01  Outputs:  -- RESISTANCES: Pulmonaryvascular resistance (dsc): 69.93  Outputs:  -- RESISTANCES: Pulmonary vascular resistance (Wood Units): 0.87  Outputs:  -- RESISTANCES: PVR_SVR Ratio: 0.09  Outputs:  -- RESISTANCES: Right ventricular stroke work: 16.06  Outputs:  -- RESISTANCES: Right ventricular stroke work index: 6.98  Outputs:  -- RESISTANCES: Systemic vascular index (dsc): 1850.86  Outputs:  -- RESISTANCES: Systemic vascular index (Wood Units): 23.14  Outputs:  -- RESISTANCES: Systemic vascular resistance (dsc): 804.21  Outputs:  -- RESISTANCES: Systemic vascular resistance (Wood Units): 10.06  Outputs:  -- RESISTANCES: Total pulmonary index (dsc): 563.30  Outputs:  -- RESISTANCES: Total pulmonary index (Wood Units): 7.04  Outputs:  -- RESISTANCES: Total pulmonary resistance (dsc): 244.76  Outputs:  -- RESISTANCES: Total pulmonary resistance (Wood Units): 3.06  Outputs:  -- RESISTANCES: Total vascular index (Wood Units): 27.17  Outputs:  -- RESISTANCES: Total vascular resistance (dsc): 944.07  Outputs:  -- RESISTANCES: Total vascular resistance (Wood Units): 11.80  Outputs:  -- RESISTANCES: Total vascular resistance index (dsc): 2172.74  Outputs:  -- RESISTANCES: TPR_TVR Ratio: 0.26  Outputs:  -- SHUNTS: Pulmonary flow: 9.15  Outputs:  -- SHUNTS: Qp Indexed: 3.98  Outputs:  -- SHUNTS: Qs Indexed: 3.98  Outputs:  -- SHUNTS: Systemic flow: 9.15    < end of copied text >    EKG: st no ischemic abns

## 2018-07-09 NOTE — CONSULT NOTE ADULT - ASSESSMENT
48 year old female with PMH of asthma s/p multiple intubations (last intubation 12/2016), right heart failure, DM2, Essential HTN, HTN, MTHFR deficiency and homocysteinuria, Prinzmetal angina, migraines, osteoporosis, and gastric ulcers presents with worsening shortness of breath. Patient states she started experiencing URI symptoms about 1 week ago. She reports intermittent fevers T max of 101.8, chills, fatigue, nausea, sinus pressure, lightheadedness, b/l ear pain, throat pain, bilateral chest wall discomfort, SOB, productive cough with yellow sputum, rhinorrhea with yellow sputum, and decreased appetite.  She followed up with her pulmonologist this past Thursday and was diagnosed with bilateral otitis media, sinusitis, and bronchitis.  She was started on Prednisone 60 mg and Augmentin BID.  Despite taking this along with a cough suppressant, decongestant, nsaids, and duonebs/xopenex, she did not have any improvement in symptoms.  She went to an OSH yesterday but after waiting over an hour in the waiting room left.  Denies V/D/C, abd pain, ext numbness/weakness, dysuria, hematuria.     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 Patient has had 21 intubations in the past.     In ED, VS: afebrile, , /73,RR 20, sat 95%RA, CBC unremarkable, BUN 9, Cr 0.57, Glu 147, Ca 8.4, alb 3.2, alk phos 153, AST 60, ALT 78, cardiac enzymes neg x1, RVP neg, strep neg, CXR clear lungs, EKG sinus tach @ 101BPM left atrial enlargment. Received 2 NS boluses, albuterol nebs, duoneb, IV benadryl, toradol, Iv solu medrol. (08 Jul 2018 20:28)    In the ICU she reported chest discomofort, which is ongoing.  It is moderate in intensity, located in the mid chest with some radiation to the left shoulder and up into the neck/jaw.  She associated this with her prior anginal episode, for which she usually will take ntg.  She has not taken a ntg in over a year.  Ekg has revealed no ischemic changes.      -there is no objective evidence of acute ischemia.  -she does carry a diagnosis of Prinzmetal's angina, and has been treated with ntg  -catheterization has been performed for these sxs previously, as recently as 2013, and she has not been found to have any significant cad  -although objective evidence of ischemia is lacking, there seems to be little downside to symptomatic treatment with ntg sl  -would cycle ce for now and r/o mi  -there is no evidence of significant arrhythmia.  -there is no evidence for meaningful  volume overload.  -her resp status seems tenuous, and I would not be surprised if she required intubation or bipap  -abx  -steroids  -bronchodilators  -DVT prophylaxis  -monitor electrolytes, keep k>4, Mg>2  -will follow    The patient is at risk of abrupt decompensation.  35 minutes of critical care time was spent with this patient.

## 2018-07-09 NOTE — CHART NOTE - NSCHARTNOTEFT_GEN_A_CORE
Pt coughing excessively  Difficulty speaking in full sentences    Pt refusing bipap    Will give stat treatment  Pt may require intubation

## 2018-07-09 NOTE — CONSULT NOTE ADULT - ASSESSMENT
A:    48yFemale  HD # 2     Here for:    1. Severe asthma exacerbation  2. Acute resp failure  3. Lactemia/metabolic acidosis, suspect 2/2 @1/2      This patient requires critical care for support of one or more vital organ systems with a high probability of imminent or life threatening deterioration in his/her condition    P:    Accept to ICU.    Avoid deleriogenic meds. HD monitoring, cont coreg, quinapril. Does not appear volume overloaded clinically or on CXR, Cont nebs, steroids; Pt may require NIV/bipap v intubation. Pt refused ABG. Cont singulair, spiriva. SUSPECT Pt has underlying undiagnosed GALINDO/OHS given body habitus. Cont diet; Pt refusing DM diet. I/O's. No deep lines.    Dispo: Accept to ICU.    TOTAL CRITICAL CARE TIME: 35 minutes  (EXCLUSIVE of any non bundled procedures)    Note: This time spent INCLUDES time spent directly as this patient's bedside with evaluation, review of chart including review of laboratory and imaging studies, interpretation of vital signs and cardiac output measurements, any necessary ventilator management, and time spent discussing plan of care with patient and family, including goals of care discussion.

## 2018-07-09 NOTE — PROGRESS NOTE ADULT - ASSESSMENT
48 year old female with PMH of asthma s/p multiple intubations, right heart failure, DM2, Essential HTN, MTHFR deficiency and homocysteinuria, Prinzmetal angina admitted with acute severe asthma exacerbation likely 2/2 viral URI with possible b/l otitis media R/O sinusitis.

## 2018-07-09 NOTE — DIETITIAN INITIAL EVALUATION ADULT. - PROBLEM SELECTOR PLAN 3
Stable - Continue with coreg with hold parameters  Dr Abreu Cardiology group consulted. CE, ECG negative

## 2018-07-09 NOTE — DIETITIAN INITIAL EVALUATION ADULT. - PROBLEM SELECTOR PLAN 5
Hold home meds  Hypoglycemic protocol, mod ISS, accuchecks qac qhs  Continue with lantus 50units BID and humalog 20units TID  Patient REFUSING carb con dash/tlc diet  F/U HbA1C  consult dr. perlman, endocrine

## 2018-07-09 NOTE — DIETITIAN INITIAL EVALUATION ADULT. - ADHERENCE
fair/patient states allergic to artifical sweeteners uses sugar to some extent at home. states when given steroids does tend to eat more. she works overnight

## 2018-07-09 NOTE — DIETITIAN INITIAL EVALUATION ADULT. - PROBLEM SELECTOR PLAN 6
Stable - home med prn zaroxolyn lasix  Cardiology consulted  does not appear grossly volume overloaded

## 2018-07-09 NOTE — CONSULT NOTE ADULT - SUBJECTIVE AND OBJECTIVE BOX
Patient is a 48y old  Female who presents with a chief complaint of Worsening shortness of breath (08 Jul 2018 20:28)      Reason For Consult: dm2 uncontrolled    HPI:  48 year old female with PMH of asthma s/p multiple intubations (last intubation 12/2016), right heart failure, DM2, Essential HTN, HTN, MTHFR deficiency and homocysteinuria, Prinzmetal angina, migraines, osteoporosis, and gastric ulcers presents with worsening shortness of breath since yesterday. Patient states she started experiencing URI symptoms about 1 week ago. She reports intermittent fevers T max of 101.8, chills, fatigue, nausea, sinus pressure, lightheadedness, b/l ear pain, throat pain, bilateral chest wall discomfort, SOB, productive cough with yellow sputum, rhinorrhea with yellow sputum, and decreased appetite.  She followed up with her pulmonologist this past Thursday and was diagnosed with bilateral otitis media, sinusitis, and bronchitis.  She was started on Prednisone 60 mg and Augmentin BID.  Despite taking this along with a cough suppressant, decongestant, nsaids, and duonebs/xopenex.  Pt reports no improvement in symptoms.  She went to an OSH yesterday but after waiting over an hour in the waiting room left.  Denies V/D/C, abd pain, ext numbness/weakness, dysuria, hematuria.     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 Patient has had 21 intubations in the past.     In ED, VS: afebrile, , /73,RR 20, sat 95%RA, CBC unremarkable, BUN 9, Cr 0.57, Glu 147, Ca 8.4, alb 3.2, alk phos 153, AST 60, ALT 78, cardiac enzymes neg x1, RVP neg, strep neg, CXR clear lungs, EKG sinus tach @ 101BPM left atrial enlargment. Received 2 NS boluses, albuterol nebs, duoneb, IV benadryl, toradol, Iv solu medrol. (08 Jul 2018 20:28)      PAST MEDICAL & SURGICAL HISTORY:  Osteoporosis  Gastric ulcer  HLD (hyperlipidemia)  Essential hypertension, hypertension with unspecified goal  Diabetes mellitus type 2 in obese  Migraine  MTHFR (methylene THF reductase) deficiency and homocystinuria  Polycystic disease, ovaries  CVA (cerebral vascular accident)  GERD (gastroesophageal reflux disease)  Obesity  Prinzmetal Angina  Asthma: intubated in the past  H/O ovarian cystectomy  Salpingo-oophoritis: oophrectomy  History of tonsillectomy  History of Cholecystectomy  Status Post Nissen Fundoplication (with Gastrostomy Tube Placement)      FAMILY HISTORY:  No pertinent family history in first degree relatives        Social History:    MEDICATIONS  (STANDING):  ALBUTerol    0.083% 2.5 milliGRAM(s) Nebulizer every 2 hours  aspirin enteric coated 81 milliGRAM(s) Oral daily  carvedilol 25 milliGRAM(s) Oral every 12 hours  dextrose 5%. 1000 milliLiter(s) (50 mL/Hr) IV Continuous <Continuous>  dextrose 50% Injectable 12.5 Gram(s) IV Push once  dextrose 50% Injectable 25 Gram(s) IV Push once  dextrose 50% Injectable 25 Gram(s) IV Push once  enoxaparin Injectable 40 milliGRAM(s) SubCutaneous daily  fluticasone propionate 50 MICROgram(s)/spray Nasal Spray 2 Spray(s) Both Nostrils daily  folic acid 4 milliGRAM(s) Oral daily  insulin glargine Injectable (LANTUS) 50 Unit(s) SubCutaneous at bedtime  insulin glargine Injectable (LANTUS) 50 Unit(s) SubCutaneous every morning  insulin lispro (HumaLOG) corrective regimen sliding scale   SubCutaneous Before meals and at bedtime  insulin lispro Injectable (HumaLOG) 20 Unit(s) SubCutaneous three times a day before meals  lisinopril 20 milliGRAM(s) Oral daily  loratadine 10 milliGRAM(s) Oral daily  methylPREDNISolone sodium succinate Injectable 40 milliGRAM(s) IV Push every 8 hours  montelukast 10 milliGRAM(s) Oral daily  omega-3-Acid Ethyl Esters 2 Gram(s) Oral daily  pantoprazole    Tablet 40 milliGRAM(s) Oral before breakfast  simvastatin 20 milliGRAM(s) Oral at bedtime  sodium phosphate IVPB 15 milliMole(s) IV Intermittent once  tiotropium 18 MICROgram(s) Capsule 1 Capsule(s) Inhalation daily    MEDICATIONS  (PRN):  dextrose 40% Gel 15 Gram(s) Oral once PRN Blood Glucose LESS THAN 70 milliGRAM(s)/deciliter  diphenhydrAMINE   Capsule 100 milliGRAM(s) Oral at bedtime PRN Insomnia  diphenhydrAMINE   Capsule 50 milliGRAM(s) Oral once PRN Insomnia  glucagon  Injectable 1 milliGRAM(s) IntraMuscular once PRN Glucose LESS THAN 70 milligrams/deciliter  HYDROcodone/homatropine Syrup 10 milliLiter(s) Oral daily PRN Cough  ondansetron Injectable 4 milliGRAM(s) IV Push every 6 hours PRN Nausea and/or Vomiting        T(C): 37.1 (07-09-18 @ 07:41), Max: 37.3 (07-08-18 @ 23:26)  HR: 104 (07-09-18 @ 07:32) (99 - 118)  BP: 112/56 (07-09-18 @ 07:00) (106/57 - 142/82)  RR: 44 (07-09-18 @ 07:00) (18 - 44)  SpO2: 95% (07-09-18 @ 07:32) (90% - 99%)  Wt(kg): --    PHYSICAL EXAM:  GENERAL: NAD, well-groomed, well-developed  HEAD:  Atraumatic, Normocephalic  NECK: Supple, No JVD, Normal thyroid  CHEST/LUNG: diminished breath sounds  HEART: Regular rate and rhythm; No murmurs, rubs, or gallops  ABDOMEN: Soft, Nontender, Nondistended; Bowel sounds present  EXTREMITIES:  2+ Peripheral Pulses, No clubbing, cyanosis, or edema  SKIN: No rashes or lesions    CAPILLARY BLOOD GLUCOSE      POCT Blood Glucose.: 307 mg/dL (09 Jul 2018 07:51)  POCT Blood Glucose.: 261 mg/dL (08 Jul 2018 21:13)                            13.6   11.64 )-----------( 333      ( 09 Jul 2018 06:41 )             41.0       CMP:  07-09 @ 06:41  SGPT --  Albumin --   Alk Phos --   Anion Gap 12   SGOT --   Total Bili --   BUN 21   Calcium Total 8.5   CO2 19   Chloride 106   Creatinine 0.93   eGFR if AA 85   eGFR if non AA 73   Glucose 357   Potassium 4.7   Protein --   Sodium 137      Thyroid Function Tests:      Diabetes Tests: 07-09 @ 07:35 HbA1c 8.2 C-Peptide --         Radiology:

## 2018-07-09 NOTE — DIETITIAN INITIAL EVALUATION ADULT. - PROBLEM SELECTOR PLAN 2
possible otitis media? - continue augmentin  R/O sinusitis - Requests CT scan.   Supportive care  CXR - neg, RVP - neg  hycodan prn   See plan above

## 2018-07-09 NOTE — CONSULT NOTE ADULT - SUBJECTIVE AND OBJECTIVE BOX
ICU Progress Note    HPI:    S:    Pt seen and examined  HD # 2  Pt here for SOB, cough x 1 week  PMHx severe asthma requiring multiple (21) intubation in past, last 12/2016, dCHF, DM, HTN, multiple autoimmune disorders, Prinzmetal angina, migraines, osteoporosis, PUD, morbid obesity    Pt admitted yesterday evening for asthma exacerbation  Worsening on floors  Refused ABG  Hospitalist called me for consult    Pt found sitting in bed after just ambulating to bathroom  Pt only able to speak 1-2 words at a time between breaths  Pt states has not been feeling well for a week cough SOB, fatigue, cough, fevers  s/p 7 days augmentin as outpt and 40mg prednisone daily, usually not steroid dependent      Allergies    Depakote (Unknown)  Diprivan (Nausea)  gadolinium containing compounds (Angioedema)  latex (Unknown)  levofloxacin (Anaphylaxis (Mod to Severe))  nutrasweet (Unknown)  Ofirmev (Anaphylaxis)  propofol (Unknown)    Intolerances        MEDICATIONS  (STANDING):  aspirin enteric coated 81 milliGRAM(s) Oral daily  carvedilol 25 milliGRAM(s) Oral every 12 hours  dextrose 5%. 1000 milliLiter(s) (50 mL/Hr) IV Continuous <Continuous>  dextrose 50% Injectable 12.5 Gram(s) IV Push once  dextrose 50% Injectable 25 Gram(s) IV Push once  dextrose 50% Injectable 25 Gram(s) IV Push once  enoxaparin Injectable 40 milliGRAM(s) SubCutaneous daily  fluticasone propionate 50 MICROgram(s)/spray Nasal Spray 2 Spray(s) Both Nostrils daily  folic acid 4 milliGRAM(s) Oral daily  insulin glargine Injectable (LANTUS) 50 Unit(s) SubCutaneous at bedtime  insulin glargine Injectable (LANTUS) 50 Unit(s) SubCutaneous every morning  insulin lispro (HumaLOG) corrective regimen sliding scale   SubCutaneous Before meals and at bedtime  insulin lispro Injectable (HumaLOG) 20 Unit(s) SubCutaneous three times a day before meals  levalbuterol Inhalation 0.63 milliGRAM(s) Inhalation every 4 hours  lisinopril 20 milliGRAM(s) Oral daily  loratadine 10 milliGRAM(s) Oral daily  methylPREDNISolone sodium succinate Injectable 40 milliGRAM(s) IV Push every 8 hours  montelukast 10 milliGRAM(s) Oral daily  omega-3-Acid Ethyl Esters 2 Gram(s) Oral daily  pantoprazole    Tablet 40 milliGRAM(s) Oral before breakfast  simvastatin 20 milliGRAM(s) Oral at bedtime  tiotropium 18 MICROgram(s) Capsule 1 Capsule(s) Inhalation daily    MEDICATIONS  (PRN):  dextrose 40% Gel 15 Gram(s) Oral once PRN Blood Glucose LESS THAN 70 milliGRAM(s)/deciliter  diphenhydrAMINE   Capsule 100 milliGRAM(s) Oral at bedtime PRN Insomnia  diphenhydrAMINE   Capsule 50 milliGRAM(s) Oral once PRN Insomnia  glucagon  Injectable 1 milliGRAM(s) IntraMuscular once PRN Glucose LESS THAN 70 milligrams/deciliter  HYDROcodone/homatropine Syrup 10 milliLiter(s) Oral daily PRN Cough      Drug Dosing Weight  Height (cm): 162.56 (08 Jul 2018 15:11)  Weight (kg): 138.3 (08 Jul 2018 15:11)  BMI (kg/m2): 52.3 (08 Jul 2018 15:11)  BSA (m2): 2.34 (08 Jul 2018 15:11)      PAST MEDICAL & SURGICAL HISTORY:  Osteoporosis  Gastric ulcer  HLD (hyperlipidemia)  Essential hypertension, hypertension with unspecified goal  Diabetes mellitus type 2 in obese  Migraine  MTHFR (methylene THF reductase) deficiency and homocystinuria  Polycystic disease, ovaries  CVA (cerebral vascular accident)  GERD (gastroesophageal reflux disease)  Obesity  Prinzmetal Angina  Asthma: intubated in the past  H/O ovarian cystectomy  Salpingo-oophoritis: oophrectomy  History of tonsillectomy  History of Cholecystectomy  Status Post Nissen Fundoplication (with Gastrostomy Tube Placement)      FAMILY HISTORY:  No pertinent family history in first degree relatives          ROS: See HPI; otherwise, all systems reviewed and negative.    O:    ICU Vital Signs Last 24 Hrs  T(C): 37.3 (08 Jul 2018 23:26), Max: 37.3 (08 Jul 2018 23:26)  T(F): 99.2 (08 Jul 2018 23:26), Max: 99.2 (08 Jul 2018 23:26)  HR: 102 (09 Jul 2018 00:43) (100 - 117)  BP: 106/72 (08 Jul 2018 23:26) (106/72 - 142/82)  BP(mean): --  ABP: --  ABP(mean): --  RR: 20 (08 Jul 2018 23:26) (18 - 20)  SpO2: 97% (09 Jul 2018 00:43) (93% - 97%)          I&O's Detail          PE:    Constitutional: Mildly ill appearing F sitting in bed in NAD.   Neck: No JVD, trachea midline.   Respiratory: Marked, scattered wheezing B/L.  Cardiovascular: S1S2+ RRR no M/R/G.  Gastrointestinal: Obese, Soft, NTND.  Extremities: No peripheral edema, No cyanosis, clubbing.  Neurological: Awake, conversive, alert, no gross deficits.  Skin: No rashes, warm, moist.    LABS:    CBC Full  -  ( 08 Jul 2018 16:20 )  WBC Count : 9.86 K/uL  Hemoglobin : 14.9 g/dL  Hematocrit : 43.8 %  Platelet Count - Automated : 329 K/uL  Mean Cell Volume : 83.3 fl  Mean Cell Hemoglobin : 28.3 pg  Mean Cell Hemoglobin Concentration : 34.0 gm/dL  Auto Neutrophil # : 6.50 K/uL  Auto Lymphocyte # : 2.36 K/uL  Auto Monocyte # : 0.60 K/uL  Auto Eosinophil # : 0.32 K/uL  Auto Basophil # : 0.04 K/uL  Auto Neutrophil % : 66.0 %  Auto Lymphocyte % : 23.9 %  Auto Monocyte % : 6.1 %  Auto Eosinophil % : 3.2 %  Auto Basophil % : 0.4 %    07-08    138  |  104  |  9   ----------------------------<  149<H>  4.1   |  25  |  0.57    Ca    8.4<L>      08 Jul 2018 16:20    TPro  8.2  /  Alb  3.2<L>  /  TBili  0.4  /  DBili  x   /  AST  60<H>  /  ALT  78  /  AlkPhos  153<H>  07-08        CAPILLARY BLOOD GLUCOSE      POCT Blood Glucose.: 261 mg/dL (08 Jul 2018 21:13)        LIVER FUNCTIONS - ( 08 Jul 2018 16:20 )  Alb: 3.2 g/dL / Pro: 8.2 g/dL / ALK PHOS: 153 U/L / ALT: 78 U/L / AST: 60 U/L / GGT: x

## 2018-07-09 NOTE — PROVIDER CONTACT NOTE (OTHER) - SITUATION
Finger stick taken at 1151. pt was unsure if she was going to eat lunch. pt did not want to take premeal lunch until lunch arrived and she could decide if she wanted lunch. lunch arrived at 1240.

## 2018-07-09 NOTE — PROVIDER CONTACT NOTE (CRITICAL VALUE NOTIFICATION) - ACTION/TREATMENT ORDERED:
VBG and lactate results reviewed by Dr Reyes, pt for transfer to ICU for further treatment/management English

## 2018-07-09 NOTE — DIETITIAN INITIAL EVALUATION ADULT. - OTHER INFO
patient reports familiar with diet at home but with allergy to artificial sweeteners takes sugar in diet in [employer] drinks and tea. 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 states POCT 307 is controlled for her shile on steroids. A1c 8.2% was 7.6% in January went down now.

## 2018-07-09 NOTE — PROGRESS NOTE ADULT - SUBJECTIVE AND OBJECTIVE BOX
Patient is a 48y old  Female who presents with a chief complaint of Worsening shortness of breath (08 Jul 2018 20:28)      INTERVAL HPI/OVERNIGHT EVENTS:    Transferred to ICU earlier today due to respiratory distress.    MEDICATIONS  (STANDING):  ALBUTerol    0.083% 2.5 milliGRAM(s) Nebulizer every 2 hours  amoxicillin  875 milliGRAM(s)/clavulanate 1 Tablet(s) Oral two times a day  aspirin enteric coated 81 milliGRAM(s) Oral daily  buDESOnide  80 MICROgram(s)/formoterol 4.5 MICROgram(s) Inhaler 2 Puff(s) Inhalation two times a day  carvedilol 25 milliGRAM(s) Oral every 12 hours  dextrose 5%. 1000 milliLiter(s) (50 mL/Hr) IV Continuous <Continuous>  dextrose 50% Injectable 12.5 Gram(s) IV Push once  dextrose 50% Injectable 25 Gram(s) IV Push once  dextrose 50% Injectable 25 Gram(s) IV Push once  enoxaparin Injectable 40 milliGRAM(s) SubCutaneous every 12 hours  fluticasone propionate 50 MICROgram(s)/spray Nasal Spray 2 Spray(s) Both Nostrils daily  folic acid 4 milliGRAM(s) Oral daily  insulin glargine Injectable (LANTUS) 50 Unit(s) SubCutaneous at bedtime  insulin glargine Injectable (LANTUS) 50 Unit(s) SubCutaneous every morning  insulin lispro (HumaLOG) corrective regimen sliding scale   SubCutaneous Before meals and at bedtime  insulin lispro Injectable (HumaLOG) 20 Unit(s) SubCutaneous three times a day before meals  lisinopril 20 milliGRAM(s) Oral daily  loratadine 10 milliGRAM(s) Oral daily  methylPREDNISolone sodium succinate Injectable 40 milliGRAM(s) IV Push every 8 hours  montelukast 10 milliGRAM(s) Oral daily  omega-3-Acid Ethyl Esters 2 Gram(s) Oral daily  pantoprazole    Tablet 40 milliGRAM(s) Oral before breakfast  simvastatin 20 milliGRAM(s) Oral at bedtime  tiotropium 18 MICROgram(s) Capsule 1 Capsule(s) Inhalation daily      MEDICATIONS  (PRN):  dextrose 40% Gel 15 Gram(s) Oral once PRN Blood Glucose LESS THAN 70 milliGRAM(s)/deciliter  diphenhydrAMINE   Capsule 100 milliGRAM(s) Oral at bedtime PRN Insomnia  diphenhydrAMINE   Capsule 50 milliGRAM(s) Oral once PRN Insomnia  glucagon  Injectable 1 milliGRAM(s) IntraMuscular once PRN Glucose LESS THAN 70 milligrams/deciliter  HYDROcodone/homatropine Syrup 5 milliLiter(s) Oral every 6 hours PRN Cough  ondansetron Injectable 4 milliGRAM(s) IV Push every 6 hours PRN Nausea and/or Vomiting  oxyCODONE    5 mG/acetaminophen 325 mG 1 Tablet(s) Oral every 4 hours PRN Moderate Pain (4 - 6)      Allergies    Depakote (Unknown)  Diprivan (Nausea)  gadolinium containing compounds (Angioedema)  latex (Unknown)  levofloxacin (Anaphylaxis (Mod to Severe))  nutrasweet (Unknown)  Ofirmev (Anaphylaxis)  propofol (Unknown)    Intolerances        PAST MEDICAL & SURGICAL HISTORY:  Osteoporosis  Gastric ulcer  HLD (hyperlipidemia)  Essential hypertension, hypertension with unspecified goal  Diabetes mellitus type 2 in obese  Migraine  MTHFR (methylene THF reductase) deficiency and homocystinuria  Polycystic disease, ovaries  CVA (cerebral vascular accident)  GERD (gastroesophageal reflux disease)  Obesity  Prinzmetal Angina  Asthma: intubated in the past  H/O ovarian cystectomy  Salpingo-oophoritis: oophrectomy  History of tonsillectomy  History of Cholecystectomy  Status Post Nissen Fundoplication (with Gastrostomy Tube Placement)      Vital Signs Last 24 Hrs  T(C): 37.2 (09 Jul 2018 11:36), Max: 37.3 (08 Jul 2018 23:26)  T(F): 99 (09 Jul 2018 11:36), Max: 99.2 (08 Jul 2018 23:26)  HR: 101 (09 Jul 2018 11:02) (96 - 118)  BP: 101/56 (09 Jul 2018 10:00) (101/56 - 142/82)  BP(mean): 71 (09 Jul 2018 10:00) (71 - 98)  RR: 27 (09 Jul 2018 10:00) (18 - 44)  SpO2: 91% (09 Jul 2018 11:02) (90% - 99%)    PHYSICAL EXAMINATION:    GENERAL: The patient is awake and alert in no apparent distress.     HEENT: Head is normocephalic and atraumatic. Extraocular muscles are intact. Mucous membranes are moist.    NECK: Supple.    LUNGS: bilateral expiratory wheezes with prolonged expiration    HEART: Regular rate and rhythm without murmur.    ABDOMEN: Soft, obese, and nondistended.      EXTREMITIES: positive edema    NEUROLOGIC: Grossly intact.    SKIN: No ulceration or induration present.      LABS:                        13.6   11.64 )-----------( 333      ( 09 Jul 2018 06:41 )             41.0     07-09    137  |  106  |  21  ----------------------------<  357<H>  4.7   |  19<L>  |  0.93    Ca    8.5      09 Jul 2018 06:41  Phos  2.4     07-09  Mg     2.2     07-09    TPro  8.2  /  Alb  3.2<L>  /  TBili  0.4  /  DBili  x   /  AST  60<H>  /  ALT  78  /  AlkPhos  153<H>  07-08        ABG - ( 09 Jul 2018 09:02 )  pH, Arterial: 7.34  pH, Blood: x     /  pCO2: 36    /  pO2: 89    / HCO3: 20    / Base Excess: -5.4  /  SaO2: 96                CARDIAC MARKERS ( 09 Jul 2018 06:41 )  <.015 ng/mL / x     / x     / x     / x              Lactate, Blood: 2.5 mmol/L (07-09-18 @ 06:41)  Lactate, Blood: 3.6 mmol/L (07-09-18 @ 01:37)  Lactate, Blood: 1.5 mmol/L (07-08-18 @ 16:20)    Procalcitonin, Serum: <0.05 (07-08-18 @ 16:20)      MICROBIOLOGY:      RADIOLOGY & ADDITIONAL STUDIES:    Assessment:    Severe Asthma with exacerbation  Possible Sinusitis  Diabetes  Morbid Obesity    Plan:    Continue steroids + nebulizer treatments + antibiotics  ICU observation

## 2018-07-09 NOTE — PROGRESS NOTE ADULT - SUBJECTIVE AND OBJECTIVE BOX
Patient is a 48y old  Female who presents with a chief complaint of Worsening shortness of breath (08 Jul 2018 20:28)    24 hour events: c/o SOB and chest pain. Mild relief with nebulizer treatments. Patient was trailed on BiPAP but was not able to keep it on because of cough and nausea. Currently stable on 2L nasal cannula.    REVIEW OF SYSTEMS  Constitutional: No fever, chills, fatigue  Neuro: No headache, numbness, weakness  Resp: (+) cough, (+) wheezing, (+) shortness of breath  CVS: (+) chest pain, palpitations, leg swelling  GI: Admits nausea,  denies vomiting, diarrhea   : No dysuria, frequency, incontinence  Skin: No itching, burning, rashes, or lesions   Msk: No joint pain or swelling  Psych: No depression, anxiety, mood swings    T(F): 99 (07-09-18 @ 11:36), Max: 99.2 (07-08-18 @ 23:26)  HR: 96 (07-09-18 @ 15:00) (93 - 118)  BP: 112/58 (07-09-18 @ 15:00) (95/50 - 142/82)  RR: 19 (07-09-18 @ 15:00) (18 - 44)  SpO2: 95% (07-09-18 @ 15:00) (90% - 99%)          CAPILLARY BLOOD GLUCOSE      I&O's Summary    07-08 @ 07:01  -  07-09 @ 07:00  --------------------------------------------------------  IN: 120 mL / OUT: 0 mL / NET: 120 mL    07-09 @ 07:01  -  07-09 @ 15:10  --------------------------------------------------------  IN: 345 mL / OUT: 0 mL / NET: 345 mL      PHYSICAL EXAM  General: Obese, in distress due to difficulty breathing   CNS: Awake, conversive, alert, no gross deficits.  HEENT:  No JVD, trachea midline, accessory respiratory muscle use     Resp: scattered wheezing B/L.  CVS: S1S2+ RRR no Murmurs or gallops.  Abd: soft, nontender or distended  Ext: Peripheral edema, no pitting, No cyanosis, clubbing.  Skin:  No rashes, warm, moist.      MEDICATIONS  amoxicillin  875 milliGRAM(s)/clavulanate Oral    carvedilol Oral  lisinopril Oral    dextrose 40% Gel Oral PRN  dextrose 50% Injectable IV Push  dextrose 50% Injectable IV Push  dextrose 50% Injectable IV Push  glucagon  Injectable IntraMuscular PRN  insulin glargine Injectable (LANTUS) SubCutaneous  insulin glargine Injectable (LANTUS) SubCutaneous  insulin lispro (HumaLOG) corrective regimen sliding scale SubCutaneous  insulin lispro Injectable (HumaLOG) SubCutaneous  methylPREDNISolone sodium succinate Injectable IV Push  simvastatin Oral    ALBUTerol    0.083% Nebulizer  buDESOnide  80 MICROgram(s)/formoterol 4.5 MICROgram(s) Inhaler Inhalation  HYDROcodone/homatropine Syrup Oral PRN  loratadine Oral  montelukast Oral  tiotropium 18 MICROgram(s) Capsule Inhalation    diphenhydrAMINE   Capsule Oral PRN  diphenhydrAMINE   Capsule Oral PRN  ondansetron Injectable IV Push PRN  oxyCODONE    5 mG/acetaminophen 325 mG Oral PRN      aspirin enteric coated Oral  enoxaparin Injectable SubCutaneous    pantoprazole    Tablet Oral      dextrose 5%. IV Continuous  folic acid Oral      fluticasone propionate 50 MICROgram(s)/spray Nasal [place] Both Nostrils    omega-3-Acid Ethyl Esters Oral                          13.6   11.64 )-----------( 333      ( 09 Jul 2018 06:41 )             41.0       07-09    137  |  106  |  21  ----------------------------<  357<H>  4.7   |  19<L>  |  0.93    Ca    8.5      09 Jul 2018 06:41  Phos  2.4     07-09  Mg     2.2     07-09    TPro  8.2  /  Alb  3.2<L>  /  TBili  0.4  /  DBili  x   /  AST  60<H>  /  ALT  78  /  AlkPhos  153<H>  07-08    Lactate 2.5           07-09 @ 06:41    Lactate 3.6           07-09 @ 01:37    Lactate 1.5           07-08 @ 16:20      CARDIAC MARKERS ( 09 Jul 2018 06:41 )  <.015 ng/mL / x     / x     / x     / x            Rapid RVP Result: NotDetec (07-08 @ 16:52)    Radiology: < from: CT Sinuses No Cont (07.09.18 @ 13:57) >    EXAM:  CT SINUSES                            PROCEDURE DATE:  07/09/2018        INTERPRETATION:  CLINICAL STATEMENT: Headaches    TECHNIQUE: CT of the paranasal sinuses was performed without IV contrast.   Coronal reformat obtained.    COMPARISON: None.    FINDINGS:  Small retention cyst/polyps maxillary sinuses. Mild mucosal thickening   left maxillary sinus. The sphenoid ethmoid and frontal sinuses are well   aerated. There is no air-fluid level. The osteomeatal units are patent.    The cribriform plate and the lamina papyracea are intact.     The nasal septum is deviated to the right with spurring    The globes are intact. Mild prominence bilateral optic nerve sheath.    Partial opacification of the right mastoid air cells    IMPRESSION:  Mild chronic inflammatory mucosal disease of the paranasal sinuses as   described above              YUSEF KEMP M.D., ATTENDING RADIOLOGIST  This document has been electronically signed. Jul 9 2018  2:18PM                < end of copied text >    < from: Xray Chest 2 Views PA/Lat (07.08.18 @ 15:48) >    EXAM:  XR CHEST PA LAT 2V                            PROCEDURE DATE:  07/08/2018          INTERPRETATION:  CLINICAL INFORMATION: Cough, shortness of breath    EXAM: 2 views of chest performed on 7/8/2018    COMPARISON: 12/15/2017.    FINDINGS:  Thelungs are clear. No pleural effusion or pneumothorax.  The cardiac silhouette is normal.  The osseous structures are unremarkable.    IMPRESSION:  Clear lungs                CIRILO VILLAFANA M.D., ATTENDING RADIOLOGIST  This document has been electronically signed. Jul 8 2018  3:52PM        CENTRAL LINE: N            FELIX: N                          A-LINE: N                           GLOBAL ISSUE/BEST PRACTICE  Analgesia: N/A  Sedation: N/A  HOB elevation: yes  Stress ulcer prophylaxis: Pantoprazole  VTE prophylaxis: Aspirin and Enoxaparin   Glycemic control: Lantus, Sliding scale, 20 units Humalog premeal  Nutrition: Carb steady diet  CODE STATUS: Full

## 2018-07-10 LAB
ALBUMIN SERPL ELPH-MCNC: 2.8 G/DL — LOW (ref 3.3–5)
ALP SERPL-CCNC: 120 U/L — SIGNIFICANT CHANGE UP (ref 40–120)
ALT FLD-CCNC: 48 U/L — SIGNIFICANT CHANGE UP (ref 12–78)
ANION GAP SERPL CALC-SCNC: 9 MMOL/L — SIGNIFICANT CHANGE UP (ref 5–17)
AST SERPL-CCNC: 20 U/L — SIGNIFICANT CHANGE UP (ref 15–37)
BASOPHILS # BLD AUTO: 0.02 K/UL — SIGNIFICANT CHANGE UP (ref 0–0.2)
BASOPHILS NFR BLD AUTO: 0.1 % — SIGNIFICANT CHANGE UP (ref 0–2)
BILIRUB SERPL-MCNC: 0.2 MG/DL — SIGNIFICANT CHANGE UP (ref 0.2–1.2)
BUN SERPL-MCNC: 20 MG/DL — SIGNIFICANT CHANGE UP (ref 7–23)
CALCIUM SERPL-MCNC: 8 MG/DL — LOW (ref 8.5–10.1)
CHLORIDE SERPL-SCNC: 109 MMOL/L — HIGH (ref 96–108)
CO2 SERPL-SCNC: 22 MMOL/L — SIGNIFICANT CHANGE UP (ref 22–31)
CREAT SERPL-MCNC: 0.78 MG/DL — SIGNIFICANT CHANGE UP (ref 0.5–1.3)
CULTURE RESULTS: SIGNIFICANT CHANGE UP
EOSINOPHIL # BLD AUTO: 0.03 K/UL — SIGNIFICANT CHANGE UP (ref 0–0.5)
EOSINOPHIL NFR BLD AUTO: 0.2 % — SIGNIFICANT CHANGE UP (ref 0–6)
GLUCOSE SERPL-MCNC: 349 MG/DL — HIGH (ref 70–99)
GRAM STN FLD: SIGNIFICANT CHANGE UP
HCT VFR BLD CALC: 38.1 % — SIGNIFICANT CHANGE UP (ref 34.5–45)
HGB BLD-MCNC: 12.2 G/DL — SIGNIFICANT CHANGE UP (ref 11.5–15.5)
IMM GRANULOCYTES NFR BLD AUTO: 0.9 % — SIGNIFICANT CHANGE UP (ref 0–1.5)
LACTATE SERPL-SCNC: 1.8 MMOL/L — SIGNIFICANT CHANGE UP (ref 0.7–2)
LYMPHOCYTES # BLD AUTO: 1.54 K/UL — SIGNIFICANT CHANGE UP (ref 1–3.3)
LYMPHOCYTES # BLD AUTO: 9 % — LOW (ref 13–44)
MAGNESIUM SERPL-MCNC: 2.2 MG/DL — SIGNIFICANT CHANGE UP (ref 1.6–2.6)
MCHC RBC-ENTMCNC: 27.2 PG — SIGNIFICANT CHANGE UP (ref 27–34)
MCHC RBC-ENTMCNC: 32 GM/DL — SIGNIFICANT CHANGE UP (ref 32–36)
MCV RBC AUTO: 84.9 FL — SIGNIFICANT CHANGE UP (ref 80–100)
MONOCYTES # BLD AUTO: 0.7 K/UL — SIGNIFICANT CHANGE UP (ref 0–0.9)
MONOCYTES NFR BLD AUTO: 4.1 % — SIGNIFICANT CHANGE UP (ref 2–14)
NEUTROPHILS # BLD AUTO: 14.73 K/UL — HIGH (ref 1.8–7.4)
NEUTROPHILS NFR BLD AUTO: 85.7 % — HIGH (ref 43–77)
PHOSPHATE SERPL-MCNC: 2.5 MG/DL — SIGNIFICANT CHANGE UP (ref 2.5–4.5)
PLATELET # BLD AUTO: 196 K/UL — SIGNIFICANT CHANGE UP (ref 150–400)
POTASSIUM SERPL-MCNC: 4.9 MMOL/L — SIGNIFICANT CHANGE UP (ref 3.5–5.3)
POTASSIUM SERPL-SCNC: 4.9 MMOL/L — SIGNIFICANT CHANGE UP (ref 3.5–5.3)
PROT SERPL-MCNC: 7 G/DL — SIGNIFICANT CHANGE UP (ref 6–8.3)
RBC # BLD: 4.49 M/UL — SIGNIFICANT CHANGE UP (ref 3.8–5.2)
RBC # FLD: 14.1 % — SIGNIFICANT CHANGE UP (ref 10.3–14.5)
SODIUM SERPL-SCNC: 140 MMOL/L — SIGNIFICANT CHANGE UP (ref 135–145)
SPECIMEN SOURCE: SIGNIFICANT CHANGE UP
SPECIMEN SOURCE: SIGNIFICANT CHANGE UP
WBC # BLD: 17.18 K/UL — HIGH (ref 3.8–10.5)
WBC # FLD AUTO: 17.18 K/UL — HIGH (ref 3.8–10.5)

## 2018-07-10 PROCEDURE — 99291 CRITICAL CARE FIRST HOUR: CPT

## 2018-07-10 PROCEDURE — 99233 SBSQ HOSP IP/OBS HIGH 50: CPT

## 2018-07-10 PROCEDURE — 99233 SBSQ HOSP IP/OBS HIGH 50: CPT | Mod: GC

## 2018-07-10 RX ORDER — DIPHENHYDRAMINE HCL 50 MG
50 CAPSULE ORAL ONCE
Qty: 0 | Refills: 0 | Status: COMPLETED | OUTPATIENT
Start: 2018-07-10 | End: 2018-07-10

## 2018-07-10 RX ORDER — DIPHENHYDRAMINE HCL 50 MG
50 CAPSULE ORAL ONCE
Qty: 0 | Refills: 0 | Status: DISCONTINUED | OUTPATIENT
Start: 2018-07-10 | End: 2018-07-10

## 2018-07-10 RX ORDER — INSULIN LISPRO 100/ML
VIAL (ML) SUBCUTANEOUS AT BEDTIME
Qty: 0 | Refills: 0 | Status: DISCONTINUED | OUTPATIENT
Start: 2018-07-10 | End: 2018-07-12

## 2018-07-10 RX ORDER — TIOTROPIUM BROMIDE 18 UG/1
1 CAPSULE ORAL; RESPIRATORY (INHALATION) DAILY
Qty: 0 | Refills: 0 | Status: DISCONTINUED | OUTPATIENT
Start: 2018-07-10 | End: 2018-07-12

## 2018-07-10 RX ORDER — LACTOBACILLUS ACIDOPHILUS 100MM CELL
1 CAPSULE ORAL DAILY
Qty: 0 | Refills: 0 | Status: DISCONTINUED | OUTPATIENT
Start: 2018-07-10 | End: 2018-07-12

## 2018-07-10 RX ORDER — INSULIN LISPRO 100/ML
VIAL (ML) SUBCUTANEOUS
Qty: 0 | Refills: 0 | Status: DISCONTINUED | OUTPATIENT
Start: 2018-07-10 | End: 2018-07-12

## 2018-07-10 RX ORDER — FLUCONAZOLE 150 MG/1
100 TABLET ORAL DAILY
Qty: 0 | Refills: 0 | Status: DISCONTINUED | OUTPATIENT
Start: 2018-07-10 | End: 2018-07-12

## 2018-07-10 RX ORDER — BUDESONIDE AND FORMOTEROL FUMARATE DIHYDRATE 160; 4.5 UG/1; UG/1
2 AEROSOL RESPIRATORY (INHALATION)
Qty: 0 | Refills: 0 | Status: DISCONTINUED | OUTPATIENT
Start: 2018-07-10 | End: 2018-07-12

## 2018-07-10 RX ADMIN — OXYCODONE AND ACETAMINOPHEN 1 TABLET(S): 5; 325 TABLET ORAL at 16:12

## 2018-07-10 RX ADMIN — BUDESONIDE AND FORMOTEROL FUMARATE DIHYDRATE 2 PUFF(S): 160; 4.5 AEROSOL RESPIRATORY (INHALATION) at 18:54

## 2018-07-10 RX ADMIN — ALBUTEROL 2.5 MILLIGRAM(S): 90 AEROSOL, METERED ORAL at 02:03

## 2018-07-10 RX ADMIN — Medication 12: at 12:26

## 2018-07-10 RX ADMIN — Medication 4: at 22:34

## 2018-07-10 RX ADMIN — ENOXAPARIN SODIUM 40 MILLIGRAM(S): 100 INJECTION SUBCUTANEOUS at 22:33

## 2018-07-10 RX ADMIN — Medication 12: at 08:43

## 2018-07-10 RX ADMIN — Medication 50 MILLIGRAM(S): at 20:45

## 2018-07-10 RX ADMIN — GENTAMICIN SULFATE 1 DROP(S): 3 SOLUTION/ DROPS OPHTHALMIC at 06:55

## 2018-07-10 RX ADMIN — Medication 20 UNIT(S): at 08:38

## 2018-07-10 RX ADMIN — ALBUTEROL 2.5 MILLIGRAM(S): 90 AEROSOL, METERED ORAL at 10:17

## 2018-07-10 RX ADMIN — GENTAMICIN SULFATE 1 DROP(S): 3 SOLUTION/ DROPS OPHTHALMIC at 22:34

## 2018-07-10 RX ADMIN — Medication 20 UNIT(S): at 12:26

## 2018-07-10 RX ADMIN — INSULIN GLARGINE 50 UNIT(S): 100 INJECTION, SOLUTION SUBCUTANEOUS at 22:34

## 2018-07-10 RX ADMIN — OXYCODONE AND ACETAMINOPHEN 1 TABLET(S): 5; 325 TABLET ORAL at 09:45

## 2018-07-10 RX ADMIN — GENTAMICIN SULFATE 1 DROP(S): 3 SOLUTION/ DROPS OPHTHALMIC at 17:23

## 2018-07-10 RX ADMIN — Medication 1 TABLET(S): at 06:06

## 2018-07-10 RX ADMIN — INSULIN GLARGINE 50 UNIT(S): 100 INJECTION, SOLUTION SUBCUTANEOUS at 08:39

## 2018-07-10 RX ADMIN — GENTAMICIN SULFATE 1 DROP(S): 3 SOLUTION/ DROPS OPHTHALMIC at 10:06

## 2018-07-10 RX ADMIN — Medication 2 SPRAY(S): at 10:08

## 2018-07-10 RX ADMIN — Medication 2 GRAM(S): at 12:27

## 2018-07-10 RX ADMIN — FLUCONAZOLE 100 MILLIGRAM(S): 150 TABLET ORAL at 14:18

## 2018-07-10 RX ADMIN — ENOXAPARIN SODIUM 40 MILLIGRAM(S): 100 INJECTION SUBCUTANEOUS at 10:05

## 2018-07-10 RX ADMIN — Medication 81 MILLIGRAM(S): at 12:27

## 2018-07-10 RX ADMIN — MONTELUKAST 10 MILLIGRAM(S): 4 TABLET, CHEWABLE ORAL at 12:27

## 2018-07-10 RX ADMIN — CARVEDILOL PHOSPHATE 25 MILLIGRAM(S): 80 CAPSULE, EXTENDED RELEASE ORAL at 19:13

## 2018-07-10 RX ADMIN — OXYCODONE AND ACETAMINOPHEN 1 TABLET(S): 5; 325 TABLET ORAL at 04:44

## 2018-07-10 RX ADMIN — GENTAMICIN SULFATE 1 DROP(S): 3 SOLUTION/ DROPS OPHTHALMIC at 14:18

## 2018-07-10 RX ADMIN — SIMVASTATIN 20 MILLIGRAM(S): 20 TABLET, FILM COATED ORAL at 22:34

## 2018-07-10 RX ADMIN — PANTOPRAZOLE SODIUM 40 MILLIGRAM(S): 20 TABLET, DELAYED RELEASE ORAL at 06:55

## 2018-07-10 RX ADMIN — OXYCODONE AND ACETAMINOPHEN 1 TABLET(S): 5; 325 TABLET ORAL at 05:17

## 2018-07-10 RX ADMIN — LORATADINE 10 MILLIGRAM(S): 10 TABLET ORAL at 12:27

## 2018-07-10 RX ADMIN — Medication 20 UNIT(S): at 17:23

## 2018-07-10 RX ADMIN — CARVEDILOL PHOSPHATE 25 MILLIGRAM(S): 80 CAPSULE, EXTENDED RELEASE ORAL at 06:06

## 2018-07-10 RX ADMIN — OXYCODONE AND ACETAMINOPHEN 1 TABLET(S): 5; 325 TABLET ORAL at 23:01

## 2018-07-10 RX ADMIN — Medication 12: at 17:22

## 2018-07-10 RX ADMIN — TIOTROPIUM BROMIDE 1 CAPSULE(S): 18 CAPSULE ORAL; RESPIRATORY (INHALATION) at 17:33

## 2018-07-10 RX ADMIN — Medication 1 TABLET(S): at 17:24

## 2018-07-10 RX ADMIN — ALBUTEROL 2.5 MILLIGRAM(S): 90 AEROSOL, METERED ORAL at 20:21

## 2018-07-10 RX ADMIN — Medication 40 MILLIGRAM(S): at 17:24

## 2018-07-10 RX ADMIN — ALBUTEROL 2.5 MILLIGRAM(S): 90 AEROSOL, METERED ORAL at 13:32

## 2018-07-10 RX ADMIN — ALBUTEROL 2.5 MILLIGRAM(S): 90 AEROSOL, METERED ORAL at 07:52

## 2018-07-10 RX ADMIN — OXYCODONE AND ACETAMINOPHEN 1 TABLET(S): 5; 325 TABLET ORAL at 08:49

## 2018-07-10 RX ADMIN — Medication 4 MILLIGRAM(S): at 12:27

## 2018-07-10 RX ADMIN — OXYCODONE AND ACETAMINOPHEN 1 TABLET(S): 5; 325 TABLET ORAL at 17:12

## 2018-07-10 RX ADMIN — ALBUTEROL 2.5 MILLIGRAM(S): 90 AEROSOL, METERED ORAL at 23:24

## 2018-07-10 RX ADMIN — Medication 40 MILLIGRAM(S): at 06:06

## 2018-07-10 RX ADMIN — ALBUTEROL 2.5 MILLIGRAM(S): 90 AEROSOL, METERED ORAL at 05:33

## 2018-07-10 RX ADMIN — Medication 1 TABLET(S): at 17:23

## 2018-07-10 RX ADMIN — ALBUTEROL 2.5 MILLIGRAM(S): 90 AEROSOL, METERED ORAL at 17:49

## 2018-07-10 NOTE — PROGRESS NOTE ADULT - ASSESSMENT
48 year old female with PMH of asthma s/p multiple intubations (last intubation 12/2016), right heart failure, DM2, Essential HTN, HTN, MTHFR deficiency and homocysteinuria, Prinzmetal angina, migraines, osteoporosis, and gastric ulcers presents with worsening shortness of breath. Patient states she started experiencing URI symptoms about 1 week ago. She reports intermittent fevers T max of 101.8, chills, fatigue, nausea, sinus pressure, lightheadedness, b/l ear pain, throat pain, bilateral chest wall discomfort, SOB, productive cough with yellow sputum, rhinorrhea with yellow sputum, and decreased appetite.  She followed up with her pulmonologist this past Thursday and was diagnosed with bilateral otitis media, sinusitis, and bronchitis.  She was started on Prednisone 60 mg and Augmentin BID.  Despite taking this along with a cough suppressant, decongestant, nsaids, and duonebs/xopenex, she did not have any improvement in symptoms.  She went to an OSH yesterday but after waiting over an hour in the waiting room left.  Denies V/D/C, abd pain, ext numbness/weakness, dysuria, hematuria.     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 Patient has had 21 intubations in the past.     In the ICU she reported chest discomofort, which now seems to have resolved. Ekg has revealed no ischemic changes.      -there is no objective evidence of acute ischemia.  -she does carry a diagnosis of Prinzmetal's angina, and has been treated with ntg  -catheterization has been performed for these sxs previously, as recently as 2013, and she has not been found to have any significant cad  -although objective evidence of ischemia is lacking, there seems to be little downside to symptomatic treatment with ntg sl as needed  -cardiac enzymes not consistent with ACS  -Continue coreg 25 bid  -there is no evidence of significant arrhythmia.  -there is no evidence for meaningful  volume overload.  -her resp status seems tenuous.  Continue ICU care  -abx  -steroids  -bronchodilators  -monitor electrolytes, keep k>4, Mg>2  -will follow    The patient is at risk of abrupt decompensation.  35 minutes of critical care time was spent with this patient.

## 2018-07-10 NOTE — PROGRESS NOTE ADULT - SUBJECTIVE AND OBJECTIVE BOX
Patient is a 48y old  Female who presents with a chief complaint of Worsening shortness of breath (08 Jul 2018 20:28)    24 hour events: ***    REVIEW OF SYSTEMS  Constitutional: No fever, chills, fatigue  Neuro: No headache, numbness, weakness  Resp: No cough, wheezing, shortness of breath  CVS: No chest pain, palpitations, leg swelling  GI: No abdominal pain, nausea, vomiting, diarrhea   : No dysuria, frequency, incontinence  Skin: No itching, burning, rashes, or lesions   Msk: No joint pain or swelling  Psych: No depression, anxiety, mood swings    T(F): 97.8 (07-10-18 @ 05:14), Max: 99 (07-09-18 @ 11:36)  HR: 83 (07-10-18 @ 07:00) (77 - 112)  BP: 102/53 (07-10-18 @ 07:00) (93/51 - 130/77)  RR: 16 (07-10-18 @ 07:00) (16 - 42)  SpO2: 91% (07-10-18 @ 07:00) (90% - 95%)  Wt(kg): --        CAPILLARY BLOOD GLUCOSE      I&O's Summary    07-09 @ 07:01  -  07-10 @ 07:00  --------------------------------------------------------  IN: 930 mL / OUT: 2000 mL / NET: -1070 mL      PHYSICAL EXAM  General:   CNS:   HEENT:   Resp:   CVS:   Abd:   Ext:   Skin:     MEDICATIONS  amoxicillin  875 milliGRAM(s)/clavulanate Oral    carvedilol Oral  lisinopril Oral    dextrose 40% Gel Oral PRN  dextrose 50% Injectable IV Push  dextrose 50% Injectable IV Push  dextrose 50% Injectable IV Push  glucagon  Injectable IntraMuscular PRN  insulin glargine Injectable (LANTUS) SubCutaneous  insulin glargine Injectable (LANTUS) SubCutaneous  insulin lispro (HumaLOG) corrective regimen sliding scale SubCutaneous  insulin lispro Injectable (HumaLOG) SubCutaneous  methylPREDNISolone sodium succinate Injectable IV Push  simvastatin Oral    ALBUTerol    0.083% Nebulizer  buDESOnide  80 MICROgram(s)/formoterol 4.5 MICROgram(s) Inhaler Inhalation  HYDROcodone/homatropine Syrup Oral PRN  loratadine Oral  montelukast Oral  tiotropium 18 MICROgram(s) Capsule Inhalation    diphenhydrAMINE   Capsule Oral PRN  diphenhydrAMINE   Capsule Oral PRN  ondansetron Injectable IV Push PRN  oxyCODONE    5 mG/acetaminophen 325 mG Oral PRN      aspirin enteric coated Oral  enoxaparin Injectable SubCutaneous    pantoprazole    Tablet Oral      dextrose 5%. IV Continuous  folic acid Oral      artificial  tears Solution Both EYES PRN  fluticasone propionate 50 MICROgram(s)/spray Nasal Spray Both Nostrils  gentamicin 0.3% Ophthalmic Solution Both EYES    omega-3-Acid Ethyl Esters Oral                          13.6   11.64 )-----------( 333      ( 09 Jul 2018 06:41 )             41.0       07-10    140  |  109<H>  |  20  ----------------------------<  349<H>  4.9   |  22  |  0.78    Ca    8.0<L>      10 Jul 2018 06:23  Phos  2.5     07-10  Mg     2.2     07-10    TPro  7.0  /  Alb  2.8<L>  /  TBili  0.2  /  DBili  x   /  AST  20  /  ALT  48  /  AlkPhos  120  07-10    Lactate 1.8           07-10 @ 06:23      CARDIAC MARKERS ( 09 Jul 2018 22:22 )  <.015 ng/mL / x     / x     / x     / x      CARDIAC MARKERS ( 09 Jul 2018 15:00 )  <.015 ng/mL / x     / x     / x     / x      CARDIAC MARKERS ( 09 Jul 2018 06:41 )  <.015 ng/mL / x     / x     / x     / x              .Throat Throat   No Strep pyogenes (Group A) isolated -- 07-08 @ 21:45  .Blood Blood-Venous   No growth to date. -- 07-08 @ 17:26      Rapid RVP Result: Amatec (07-08 @ 16:52)    Radiology: ***  Bedside lung ultrasound: ***  Bedside ECHO: ***    CENTRAL LINE: Y/N          DATE INSERTED:              REMOVE: Y/N  FELIX: Y/N                        DATE INSERTED:              REMOVE: Y/N  A-LINE: Y/N                       DATE INSERTED:              REMOVE: Y/N    GLOBAL ISSUE/BEST PRACTICE  Analgesia:   Sedation:   CAM-ICU:   HOB elevation: yes  Stress ulcer prophylaxis:   VTE prophylaxis:   Glycemic control:   Nutrition:     CODE STATUS: ***  GO discussion: Y Patient is a 48y old  Female who presents with a chief complaint of Worsening shortness of breath (08 Jul 2018 20:28)    24 hour events: Patient had no acute events overnight, she states that she is feeling much better than yesterday. She is currently on 2L NC.    REVIEW OF SYSTEMS  Constitutional: No fever, chills, fatigue  Neuro: No headache, numbness, weakness  Resp: + for cough, wheezing, shortness of breath  CVS: + for chest pain, palpitations, leg swelling  GI: No abdominal pain, vomiting, diarrhea   : No dysuria, frequency, incontinence  Skin: No itching, burning, rashes, or lesions   Msk: No joint pain or swelling  Psych: No depression, anxiety, mood swings    T(F): 97.8 (07-10-18 @ 05:14), Max: 99 (07-09-18 @ 11:36)  HR: 83 (07-10-18 @ 07:00) (77 - 112)  BP: 102/53 (07-10-18 @ 07:00) (93/51 - 130/77)  RR: 16 (07-10-18 @ 07:00) (16 - 42)  SpO2: 91% (07-10-18 @ 07:00) (90% - 95%)  Wt(kg): --    CAPILLARY BLOOD GLUCOSE    I&O's Summary    07-09 @ 07:01  -  07-10 @ 07:00  --------------------------------------------------------  IN: 930 mL / OUT: 2000 mL / NET: -1070 mL    PHYSICAL EXAM  General: Obese, appears comfortable  CNS: Awake,, alert, no gross deficits.  HEENT:  No JVD, trachea midline  Resp: scattered wheezing B/L  CVS: S1S2+ RRR no Murmurs or gallops.  Abd: soft, nontender or distended  Ext: Peripheral edema, no pitting, No cyanosis, clubbing.  Skin:  No rashes, warm, moist.    MEDICATIONS  amoxicillin  875 milliGRAM(s)/clavulanate Oral  carvedilol Oral  lisinopril Oral  dextrose 40% Gel Oral PRN  dextrose 50% Injectable IV Push  dextrose 50% Injectable IV Push  dextrose 50% Injectable IV Push  glucagon  Injectable IntraMuscular PRN  insulin glargine Injectable (LANTUS) SubCutaneous  insulin glargine Injectable (LANTUS) SubCutaneous  insulin lispro (HumaLOG) corrective regimen sliding scale SubCutaneous  insulin lispro Injectable (HumaLOG) SubCutaneous  methylPREDNISolone sodium succinate Injectable IV Push  simvastatin Oral  ALBUTerol    0.083% Nebulizer  buDESOnide  80 MICROgram(s)/formoterol 4.5 MICROgram(s) Inhaler Inhalation  HYDROcodone/homatropine Syrup Oral PRN  loratadine Oral  montelukast Oral  tiotropium 18 MICROgram(s) Capsule Inhalation  diphenhydrAMINE   Capsule Oral PRN  diphenhydrAMINE   Capsule Oral PRN  ondansetron Injectable IV Push PRN  oxyCODONE    5 mG/acetaminophen 325 mG Oral PRN  aspirin enteric coated Oral  enoxaparin Injectable SubCutaneous  pantoprazole    Tablet Oral    dextrose 5%. IV Continuous  folic acid Oral  artificial  tears Solution Both EYES PRN  fluticasone propionate 50 MICROgram(s)/spray Nasal Spray Both Nostrils  gentamicin 0.3% Ophthalmic Solution Both EYES  omega-3-Acid Ethyl Esters Oral                          13.6   11.64 )-----------( 333      ( 09 Jul 2018 06:41 )             41.0       07-10    140  |  109<H>  |  20  ----------------------------<  349<H>  4.9   |  22  |  0.78    Ca    8.0<L>      10 Jul 2018 06:23  Phos  2.5     07-10  Mg     2.2     07-10    TPro  7.0  /  Alb  2.8<L>  /  TBili  0.2  /  DBili  x   /  AST  20  /  ALT  48  /  AlkPhos  120  07-10    Lactate 1.8           07-10 @ 06:23      CARDIAC MARKERS ( 09 Jul 2018 22:22 )  <.015 ng/mL / x     / x     / x     / x      CARDIAC MARKERS ( 09 Jul 2018 15:00 )  <.015 ng/mL / x     / x     / x     / x      CARDIAC MARKERS ( 09 Jul 2018 06:41 )  <.015 ng/mL / x     / x     / x     / x          .Throat Throat   No Strep pyogenes (Group A) isolated -- 07-08 @ 21:45  .Blood Blood-Venous   No growth to date. -- 07-08 @ 17:26      Rapid RVP Result: Joe (07-08 @ 16:52)    Radiology:   EXAM:  CT SINUSES                        PROCEDURE DATE:  07/09/2018      IMPRESSION:  Mild chronic inflammatory mucosal disease of the paranasal sinuses as   described above    CENTRAL LINE: N            FELIX: N                          A-LINE: N                           GLOBAL ISSUE/BEST PRACTICE  Analgesia: N/A  Sedation: N/A  HOB elevation: yes  Stress ulcer prophylaxis: Pantoprazole  VTE prophylaxis: Aspirin and Enoxaparin   Glycemic control: Lantus, Sliding scale, 20 units Humalog premeal  Nutrition: Carb steady diet  CODE STATUS: Full Patient is a 48y old  Female who presents with a chief complaint of Worsening shortness of breath (08 Jul 2018 20:28)    24 hour events: Patient had no acute events overnight, she states that she is feeling much better than yesterday. She is currently on 2L NC. Pt states she had 10 spoons of ice cream last night.     REVIEW OF SYSTEMS  Constitutional: No fever, chills, fatigue  Neuro: No headache, numbness, weakness  Resp: + for cough, wheezing, shortness of breath  CVS: + for chest pain, palpitations, leg swelling  GI: No abdominal pain, vomiting, diarrhea   : No dysuria, frequency, incontinence  Skin: No itching, burning, rashes, or lesions   Msk: No joint pain or swelling  Psych: No depression, anxiety, mood swings    T(F): 97.8 (07-10-18 @ 05:14), Max: 99 (07-09-18 @ 11:36)  HR: 83 (07-10-18 @ 07:00) (77 - 112)  BP: 102/53 (07-10-18 @ 07:00) (93/51 - 130/77)  RR: 16 (07-10-18 @ 07:00) (16 - 42)  SpO2: 91% (07-10-18 @ 07:00) (90% - 95%)  Wt(kg): --    CAPILLARY BLOOD GLUCOSE    I&O's Summary    07-09 @ 07:01  -  07-10 @ 07:00  --------------------------------------------------------  IN: 930 mL / OUT: 2000 mL / NET: -1070 mL    PHYSICAL EXAM  General: Obese, appears comfortable  CNS: Awake,, alert, no gross deficits.  HEENT:  No JVD, trachea midline  Resp: scattered wheezing B/L  CVS: S1S2+ RRR no Murmurs or gallops.  Abd: soft, nontender or distended  Ext: Peripheral edema, no pitting, No cyanosis, clubbing.  Skin:  No rashes, warm, moist.    MEDICATIONS  amoxicillin  875 milliGRAM(s)/clavulanate Oral  carvedilol Oral  lisinopril Oral  dextrose 40% Gel Oral PRN  dextrose 50% Injectable IV Push  dextrose 50% Injectable IV Push  dextrose 50% Injectable IV Push  glucagon  Injectable IntraMuscular PRN  insulin glargine Injectable (LANTUS) SubCutaneous  insulin glargine Injectable (LANTUS) SubCutaneous  insulin lispro (HumaLOG) corrective regimen sliding scale SubCutaneous  insulin lispro Injectable (HumaLOG) SubCutaneous  methylPREDNISolone sodium succinate Injectable IV Push  simvastatin Oral  ALBUTerol    0.083% Nebulizer  buDESOnide  80 MICROgram(s)/formoterol 4.5 MICROgram(s) Inhaler Inhalation  HYDROcodone/homatropine Syrup Oral PRN  loratadine Oral  montelukast Oral  tiotropium 18 MICROgram(s) Capsule Inhalation  diphenhydrAMINE   Capsule Oral PRN  diphenhydrAMINE   Capsule Oral PRN  ondansetron Injectable IV Push PRN  oxyCODONE    5 mG/acetaminophen 325 mG Oral PRN  aspirin enteric coated Oral  enoxaparin Injectable SubCutaneous  pantoprazole    Tablet Oral    dextrose 5%. IV Continuous  folic acid Oral  artificial  tears Solution Both EYES PRN  fluticasone propionate 50 MICROgram(s)/spray Nasal Spray Both Nostrils  gentamicin 0.3% Ophthalmic Solution Both EYES  omega-3-Acid Ethyl Esters Oral                          13.6   11.64 )-----------( 333      ( 09 Jul 2018 06:41 )             41.0       07-10    140  |  109<H>  |  20  ----------------------------<  349<H>  4.9   |  22  |  0.78    Ca    8.0<L>      10 Jul 2018 06:23  Phos  2.5     07-10  Mg     2.2     07-10    TPro  7.0  /  Alb  2.8<L>  /  TBili  0.2  /  DBili  x   /  AST  20  /  ALT  48  /  AlkPhos  120  07-10    Lactate 1.8           07-10 @ 06:23      CARDIAC MARKERS ( 09 Jul 2018 22:22 )  <.015 ng/mL / x     / x     / x     / x      CARDIAC MARKERS ( 09 Jul 2018 15:00 )  <.015 ng/mL / x     / x     / x     / x      CARDIAC MARKERS ( 09 Jul 2018 06:41 )  <.015 ng/mL / x     / x     / x     / x          .Throat Throat   No Strep pyogenes (Group A) isolated -- 07-08 @ 21:45  .Blood Blood-Venous   No growth to date. -- 07-08 @ 17:26      Rapid RVP Result: NotDetec (07-08 @ 16:52)    Radiology:   EXAM:  CT SINUSES                        PROCEDURE DATE:  07/09/2018      IMPRESSION:  Mild chronic inflammatory mucosal disease of the paranasal sinuses as   described above    CENTRAL LINE: N            FELIX: N                          A-LINE: N                           GLOBAL ISSUE/BEST PRACTICE  Analgesia: N/A  Sedation: N/A  HOB elevation: yes  Stress ulcer prophylaxis: Pantoprazole  VTE prophylaxis: Aspirin and Enoxaparin   Glycemic control: Lantus, Sliding scale, 20 units Humalog premeal  Nutrition: Carb steady diet  CODE STATUS: Full

## 2018-07-10 NOTE — PROGRESS NOTE ADULT - SUBJECTIVE AND OBJECTIVE BOX
CAPILLARY BLOOD GLUCOSE      POCT Blood Glucose.: 304 mg/dL (10 Jul 2018 07:47)  POCT Blood Glucose.: 324 mg/dL (09 Jul 2018 22:10)  POCT Blood Glucose.: 318 mg/dL (09 Jul 2018 16:56)  POCT Blood Glucose.: 228 mg/dL (09 Jul 2018 11:51)      Vital Signs Last 24 Hrs  T(C): 36.6 (10 Jul 2018 05:14), Max: 37.2 (09 Jul 2018 11:36)  T(F): 97.8 (10 Jul 2018 05:14), Max: 99 (09 Jul 2018 11:36)  HR: 83 (10 Jul 2018 07:00) (77 - 112)  BP: 102/53 (10 Jul 2018 07:00) (93/51 - 130/77)  BP(mean): 73 (10 Jul 2018 07:00) (65 - 99)  RR: 16 (10 Jul 2018 07:00) (16 - 42)  SpO2: 91% (10 Jul 2018 07:00) (90% - 95%)      Respiratory: diminished breath sounds  CV: RRR, S1S2, no murmurs, rubs or gallops  Abdominal: Soft, NT, ND +BS, Last BM  Extremities: No edema, + peripheral pulses    Hemoglobin A1C, Whole Blood: 8.2 % (07-09 @ 07:35)   07-10    140  |  109<H>  |  20  ----------------------------<  349<H>  4.9   |  22  |  0.78    Ca    8.0<L>      10 Jul 2018 06:23  Phos  2.5     07-10  Mg     2.2     07-10    TPro  7.0  /  Alb  2.8<L>  /  TBili  0.2  /  DBili  x   /  AST  20  /  ALT  48  /  AlkPhos  120  07-10      dextrose 40% Gel 15 Gram(s) Oral once PRN  dextrose 50% Injectable 12.5 Gram(s) IV Push once  dextrose 50% Injectable 25 Gram(s) IV Push once  dextrose 50% Injectable 25 Gram(s) IV Push once  glucagon  Injectable 1 milliGRAM(s) IntraMuscular once PRN  insulin glargine Injectable (LANTUS) 50 Unit(s) SubCutaneous at bedtime  insulin glargine Injectable (LANTUS) 50 Unit(s) SubCutaneous every morning  insulin lispro (HumaLOG) corrective regimen sliding scale   SubCutaneous Before meals and at bedtime  insulin lispro Injectable (HumaLOG) 20 Unit(s) SubCutaneous three times a day before meals  methylPREDNISolone sodium succinate Injectable 40 milliGRAM(s) IV Push every 8 hours  simvastatin 20 milliGRAM(s) Oral at bedtime

## 2018-07-10 NOTE — PROGRESS NOTE ADULT - ASSESSMENT
48M PMH Chronic persistent asthma (s/p multiple intubations), allergic rhinitis, morbid obesity, HTN, HLD, DM2 (insulin-dependent), MTHFR deficiency and homocysteinuria, Prinzmetal angina, Migraines, OP, and PUD who presents with severe asthma exacerbation in the setting of recent acute sinusitis.    - Pain control with percocet prn for LLL pleuritic chest pain  - HD stable, continue ASA, statin, lisinopril, carvedilol; SLNG prn for angina  - Start solumedrol 40 mg IV q12h for asthma exacerbation. Start albuterol nebs q3h, continue symbicort and spiriva  - Continue montelukast, loratidine, flonase, diphenhydramine prn, hycodan prn  - Diabetic diet as tolerated, continue home PPI  - Start lactobacillus acidophilus per pt request  - Stable kidney function and lytes  - Continue augmentin for sinusitis (needs 5 more days to complete total of 10 days), sinus CT with mild chronic inflammatory changes  - Continue gentamicin drops  - DVT ppx with Lovenox 40 mg subq BID  - Continue folic acid for MTHFR deficiency  - Continue lantus 50 bid + pre-meal lispro 20 tid ac + HISS  - No lines or eng  - Discussed with patient at bedside. 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     I was physically present for the key portions of the evaluation and management (E/M) service provided.  I agree with the above history, physical, and plan which I have reviewed and edited where appropriate. 48M PMH Chronic persistent asthma (s/p multiple intubations), allergic rhinitis, morbid obesity, HTN, HLD, DM2 (insulin-dependent), MTHFR deficiency and homocysteinuria, Prinzmetal angina, Migraines, OP, and PUD who presents with severe asthma exacerbation in the setting of recent acute sinusitis.    - Pain control with percocet prn for LLL pleuritic chest pain  - HD stable, continue ASA, statin, lisinopril, carvedilol; SLNG prn for angina  - Start solumedrol 40 mg IV q12h for asthma exacerbation. Start albuterol nebs q3h, continue symbicort and spiriva  - Continue montelukast, loratidine, flonase, diphenhydramine prn, hycodan prn  - Diabetic diet as tolerated, continue home PPI  - Start lactobacillus acidophilus per pt request  - Stable kidney function and lytes  - Continue augmentin for sinusitis (needs 5 more days to complete total of 10 days), sinus CT with mild chronic inflammatory changes  - Continue gentamicin drops for conjuncitivitis  - Start diflucan for yeast vaginitis  - DVT ppx with Lovenox 40 mg subq BID  - Continue folic acid for MTHFR deficiency  - Continue lantus 50 bid + pre-meal lispro 20 tid ac + HISS  - No lines or eng  - 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 48M PMH Chronic persistent asthma (s/p multiple intubations), allergic rhinitis, morbid obesity, HTN, HLD, DM2 (insulin-dependent), MTHFR deficiency and homocysteinuria, Prinzmetal angina, Migraines, OP, and PUD who presents with severe asthma exacerbation in the setting of recent acute sinusitis.    - Pain control with percocet prn for LLL pleuritic chest pain  - HD stable, continue ASA, statin, lisinopril, carvedilol; SLNG prn for angina  - Start solumedrol 40 mg IV q12h for asthma exacerbation. Start albuterol nebs q3h, continue symbicort and spiriva  - Continue montelukast, loratidine, flonase, diphenhydramine prn, hycodan prn  - Diabetic diet as tolerated, continue home PPI  - Start lactobacillus acidophilus per pt request  - Stable kidney function and lytes  - Continue augmentin for sinusitis (needs 5 more days to complete total of 10 days), sinus CT with mild chronic inflammatory changes  - Continue gentamicin drops for conjuncitivitis  - Start diflucan for yeast vaginitis  - DVT ppx with Lovenox 40 mg subq BID  - Continue folic acid for MTHFR deficiency  - Continue lantus 50 bid + pre-meal lispro 20 tid ac + HISS  - No lines or eng  - If patient doing well, consider transfer to medical floor  - 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

## 2018-07-10 NOTE — PROGRESS NOTE ADULT - SUBJECTIVE AND OBJECTIVE BOX
[facility] - Sachin Abreu, Greta, Kelli, Kem, Kavin Christian  Office Number:  [phone]    Patient resting comfortably in bed in NAD.  Dyspnea reportedly improving.  NO further chest pain.    Telemetry:  SInus rhythm.    MEDICATIONS  (STANDING):  ALBUTerol    0.083% 2.5 milliGRAM(s) Nebulizer every 3 hours  amoxicillin  875 milliGRAM(s)/clavulanate 1 Tablet(s) Oral two times a day  aspirin enteric coated 81 milliGRAM(s) Oral daily  buDESOnide  80 MICROgram(s)/formoterol 4.5 MICROgram(s) Inhaler 2 Puff(s) Inhalation two times a day  carvedilol 25 milliGRAM(s) Oral every 12 hours  dextrose 5%. 1000 milliLiter(s) (50 mL/Hr) IV Continuous <Continuous>  dextrose 50% Injectable 12.5 Gram(s) IV Push once  dextrose 50% Injectable 25 Gram(s) IV Push once  dextrose 50% Injectable 25 Gram(s) IV Push once  enoxaparin Injectable 40 milliGRAM(s) SubCutaneous every 12 hours  fluticasone propionate 50 MICROgram(s)/spray Nasal Spray 2 Spray(s) Both Nostrils daily  folic acid 4 milliGRAM(s) Oral daily  gentamicin 0.3% Ophthalmic Solution 1 Drop(s) Both EYES every 4 hours  insulin glargine Injectable (LANTUS) 50 Unit(s) SubCutaneous at bedtime  insulin glargine Injectable (LANTUS) 50 Unit(s) SubCutaneous every morning  insulin lispro (HumaLOG) corrective regimen sliding scale   SubCutaneous Before meals and at bedtime  insulin lispro Injectable (HumaLOG) 20 Unit(s) SubCutaneous three times a day before meals  lisinopril 20 milliGRAM(s) Oral daily  loratadine 10 milliGRAM(s) Oral daily  methylPREDNISolone sodium succinate Injectable 40 milliGRAM(s) IV Push every 8 hours  montelukast 10 milliGRAM(s) Oral daily  omega-3-Acid Ethyl Esters 2 Gram(s) Oral daily  pantoprazole    Tablet 40 milliGRAM(s) Oral before breakfast  simvastatin 20 milliGRAM(s) Oral at bedtime  tiotropium 18 MICROgram(s) Capsule 1 Capsule(s) Inhalation daily    MEDICATIONS  (PRN):  artificial  tears Solution 1 Drop(s) Both EYES every 4 hours PRN Dry Eyes  dextrose 40% Gel 15 Gram(s) Oral once PRN Blood Glucose LESS THAN 70 milliGRAM(s)/deciliter  diphenhydrAMINE   Capsule 100 milliGRAM(s) Oral at bedtime PRN Insomnia  diphenhydrAMINE   Capsule 50 milliGRAM(s) Oral once PRN Insomnia  glucagon  Injectable 1 milliGRAM(s) IntraMuscular once PRN Glucose LESS THAN 70 milligrams/deciliter  HYDROcodone/homatropine Syrup 5 milliLiter(s) Oral every 6 hours PRN Cough  ondansetron Injectable 4 milliGRAM(s) IV Push every 6 hours PRN Nausea and/or Vomiting  oxyCODONE    5 mG/acetaminophen 325 mG 1 Tablet(s) Oral every 4 hours PRN Moderate Pain (4 - 6)      Allergies    Depakote (Unknown)  Diprivan (Nausea)  gadolinium containing compounds (Angioedema)  latex (Unknown)  levofloxacin (Anaphylaxis (Mod to Severe))  nutrasweet (Unknown)  Ofirmev (Anaphylaxis)  propofol (Unknown)    Intolerances        Vital Signs Last 24 Hrs  T(C): 36.6 (10 Jul 2018 05:14), Max: 37.2 (09 Jul 2018 11:36)  T(F): 97.8 (10 Jul 2018 05:14), Max: 99 (09 Jul 2018 11:36)  HR: 82 (10 Jul 2018 05:00) (82 - 112)  BP: 96/52 (10 Jul 2018 05:00) (93/51 - 130/77)  BP(mean): 65 (10 Jul 2018 05:00) (65 - 99)  RR: 18 (10 Jul 2018 05:00) (18 - 44)  SpO2: 91% (10 Jul 2018 05:00) (90% - 95%)    I&O's Summary    08 Jul 2018 07:01  -  09 Jul 2018 07:00  --------------------------------------------------------  IN: 120 mL / OUT: 0 mL / NET: 120 mL    09 Jul 2018 07:01  -  10 Jul 2018 06:31  --------------------------------------------------------  IN: 930 mL / OUT: 1600 mL / NET: -670 mL        ON EXAM:    General: NAD, awake and alert, oriented x 3  HEENT: Mucous membranes are moist, anicteric  Lungs: Non-labored, breath sounds are clear bilaterally.  b/l rhonchi and wheezes.  Cardiovascular: Regular, S1 and S2, no murmurs, rubs, or gallops.  Distant  Gastrointestinal: Bowel Sounds present, soft, nontender.   Lymph: Non pitting peripheral edema. No lymphadenopathy.  Skin: No rashes or ulcers  Psych:  Mood & affect appropriate    LABS: All Labs Reviewed:                        13.6   11.64 )-----------( 333      ( 09 Jul 2018 06:41 )             41.0                         14.9   9.86  )-----------( 329      ( 08 Jul 2018 16:20 )             43.8     09 Jul 2018 06:41    137    |  106    |  21     ----------------------------<  357    4.7     |  19     |  0.93   08 Jul 2018 16:20    138    |  104    |  9      ----------------------------<  149    4.1     |  25     |  0.57     Ca    8.5        09 Jul 2018 06:41  Ca    8.4        08 Jul 2018 16:20  Phos  2.4       09 Jul 2018 06:41  Mg     2.2       09 Jul 2018 06:41    TPro  8.2    /  Alb  3.2    /  TBili  0.4    /  DBili  x      /  AST  60     /  ALT  78     /  AlkPhos  153    08 Jul 2018 16:20      CARDIAC MARKERS ( 09 Jul 2018 22:22 )  <.015 ng/mL / x     / x     / x     / x      CARDIAC MARKERS ( 09 Jul 2018 15:00 )  <.015 ng/mL / x     / x     / x     / x      CARDIAC MARKERS ( 09 Jul 2018 06:41 )  <.015 ng/mL / x     / x     / x     / x          Blood Culture: Organism --  Gram Stain Blood -- Gram Stain --  Specimen Source .Throat Throat  Culture-Blood --    Organism --  Gram Stain Blood -- Gram Stain --  Specimen Source .Blood Blood-Venous  Culture-Blood --

## 2018-07-10 NOTE — PROGRESS NOTE ADULT - SUBJECTIVE AND OBJECTIVE BOX
Patient is a 48y old  Female who presents with a chief complaint of Worsening shortness of breath (08 Jul 2018 20:28)      INTERVAL HPI: Pt seen and examined. States that she feels much better today, breathing more comfortably, still has some occasional dyspnea but improving. denies any other acute complaints.     OVERNIGHT EVENTS: none noted  T(F): 98.2 (07-11-18 @ 00:00), Max: 99.2 (07-10-18 @ 20:08)  HR: 78 (07-11-18 @ 00:00) (77 - 107)  BP: 116/60 (07-11-18 @ 00:00) (93/51 - 117/56)  RR: 23 (07-11-18 @ 00:00) (16 - 34)  SpO2: 92% (07-11-18 @ 00:00) (91% - 97%)  I&O's Summary    09 Jul 2018 07:01  -  10 Jul 2018 07:00  --------------------------------------------------------  IN: 930 mL / OUT: 2000 mL / NET: -1070 mL    10 Jul 2018 07:01  -  11 Jul 2018 00:54  --------------------------------------------------------  IN: 1290 mL / OUT: 1120 mL / NET: 170 mL        REVIEW OF SYSTEMS: 12 systems noncontributory    PHYSICAL EXAM:  GENERAL: NAD, well-groomed, well-developed, obese  HEAD:  Atraumatic, Normocephalic  EYES: EOMI, PERRLA, conjunctiva and sclera clear  ENMT: No tonsillar erythema, exudates, or enlargement; Moist mucous membranes, Good dentition, No lesions  NECK: Supple, No JVD,  NERVOUS SYSTEM:  Alert & Oriented X3, Good concentration; Motor Strength 5/5 B/L upper and lower extremities; DTRs 2+ intact and symmetric  CHEST/LUNG: wheezing but improvng, brethas mildly labored  HEART: Regular rate and rhythm; No murmurs, rubs, or gallops  ABDOMEN: Soft, Nontender, Nondistended; Bowel sounds present  EXTREMITIES:  2+ Peripheral Pulses, No clubbing, cyanosis, or edema  SKIN: No rashes or lesions    LABS:                        12.2   17.18 )-----------( 196      ( 10 Jul 2018 06:23 )             38.1     07-10    140  |  109<H>  |  20  ----------------------------<  349<H>  4.9   |  22  |  0.78    Ca    8.0<L>      10 Jul 2018 06:23  Phos  2.5     07-10  Mg     2.2     07-10    TPro  7.0  /  Alb  2.8<L>  /  TBili  0.2  /  DBili  x   /  AST  20  /  ALT  48  /  AlkPhos  120  07-10        CAPILLARY BLOOD GLUCOSE      POCT Blood Glucose.: 323 mg/dL (10 Jul 2018 22:27)  POCT Blood Glucose.: 337 mg/dL (10 Jul 2018 17:02)  POCT Blood Glucose.: 344 mg/dL (10 Jul 2018 12:18)  POCT Blood Glucose.: 304 mg/dL (10 Jul 2018 07:47)    ABG - ( 09 Jul 2018 09:02 )  pH, Arterial: 7.34  pH, Blood: x     /  pCO2: 36    /  pO2: 89    / HCO3: 20    / Base Excess: -5.4  /  SaO2: 96                07-08 @ 21:45   No Strep pyogenes (Group A) isolated  --  --  07-08 @ 17:26   No growth to date.  --  --          MEDICATIONS  (STANDING):  ALBUTerol    0.083% 2.5 milliGRAM(s) Nebulizer every 3 hours  amoxicillin  875 milliGRAM(s)/clavulanate 1 Tablet(s) Oral two times a day  aspirin enteric coated 81 milliGRAM(s) Oral daily  buDESOnide 160 MICROgram(s)/formoterol 4.5 MICROgram(s) Inhaler 2 Puff(s) Inhalation two times a day  carvedilol 25 milliGRAM(s) Oral every 12 hours  dextrose 5%. 1000 milliLiter(s) (50 mL/Hr) IV Continuous <Continuous>  dextrose 50% Injectable 12.5 Gram(s) IV Push once  dextrose 50% Injectable 25 Gram(s) IV Push once  dextrose 50% Injectable 25 Gram(s) IV Push once  enoxaparin Injectable 40 milliGRAM(s) SubCutaneous every 12 hours  fluconAZOLE   Tablet 100 milliGRAM(s) Oral daily  fluticasone propionate 50 MICROgram(s)/spray Nasal Spray 2 Spray(s) Both Nostrils daily  folic acid 4 milliGRAM(s) Oral daily  gentamicin 0.3% Ophthalmic Solution 1 Drop(s) Both EYES every 4 hours  insulin glargine Injectable (LANTUS) 50 Unit(s) SubCutaneous at bedtime  insulin glargine Injectable (LANTUS) 50 Unit(s) SubCutaneous every morning  insulin lispro (HumaLOG) corrective regimen sliding scale   SubCutaneous at bedtime  insulin lispro (HumaLOG) corrective regimen sliding scale   SubCutaneous three times a day before meals  insulin lispro Injectable (HumaLOG) 20 Unit(s) SubCutaneous three times a day before meals  lactobacillus acidophilus 1 Tablet(s) Oral daily  lisinopril 20 milliGRAM(s) Oral daily  loratadine 10 milliGRAM(s) Oral daily  methylPREDNISolone sodium succinate Injectable 40 milliGRAM(s) IV Push every 12 hours  montelukast 10 milliGRAM(s) Oral daily  omega-3-Acid Ethyl Esters 2 Gram(s) Oral daily  pantoprazole    Tablet 40 milliGRAM(s) Oral before breakfast  simvastatin 20 milliGRAM(s) Oral at bedtime  tiotropium 18 MICROgram(s) Capsule 1 Capsule(s) Inhalation daily    MEDICATIONS  (PRN):  artificial  tears Solution 1 Drop(s) Both EYES every 4 hours PRN Dry Eyes  dextrose 40% Gel 15 Gram(s) Oral once PRN Blood Glucose LESS THAN 70 milliGRAM(s)/deciliter  diphenhydrAMINE   Capsule 100 milliGRAM(s) Oral at bedtime PRN Insomnia  diphenhydrAMINE   Capsule 50 milliGRAM(s) Oral once PRN Insomnia  glucagon  Injectable 1 milliGRAM(s) IntraMuscular once PRN Glucose LESS THAN 70 milligrams/deciliter  HYDROcodone/homatropine Syrup 5 milliLiter(s) Oral every 6 hours PRN Cough  ondansetron Injectable 4 milliGRAM(s) IV Push every 6 hours PRN Nausea and/or Vomiting  oxyCODONE    5 mG/acetaminophen 325 mG 1 Tablet(s) Oral every 4 hours PRN Moderate Pain (4 - 6)

## 2018-07-11 LAB
ALBUMIN SERPL ELPH-MCNC: 3.2 G/DL — LOW (ref 3.3–5)
ALP SERPL-CCNC: 121 U/L — HIGH (ref 40–120)
ALT FLD-CCNC: 50 U/L — SIGNIFICANT CHANGE UP (ref 12–78)
ANION GAP SERPL CALC-SCNC: 7 MMOL/L — SIGNIFICANT CHANGE UP (ref 5–17)
AST SERPL-CCNC: 23 U/L — SIGNIFICANT CHANGE UP (ref 15–37)
BASOPHILS # BLD AUTO: 0.01 K/UL — SIGNIFICANT CHANGE UP (ref 0–0.2)
BASOPHILS NFR BLD AUTO: 0.1 % — SIGNIFICANT CHANGE UP (ref 0–2)
BILIRUB SERPL-MCNC: 0.3 MG/DL — SIGNIFICANT CHANGE UP (ref 0.2–1.2)
BUN SERPL-MCNC: 18 MG/DL — SIGNIFICANT CHANGE UP (ref 7–23)
CALCIUM SERPL-MCNC: 8.4 MG/DL — LOW (ref 8.5–10.1)
CHLORIDE SERPL-SCNC: 104 MMOL/L — SIGNIFICANT CHANGE UP (ref 96–108)
CO2 SERPL-SCNC: 27 MMOL/L — SIGNIFICANT CHANGE UP (ref 22–31)
CREAT SERPL-MCNC: 0.75 MG/DL — SIGNIFICANT CHANGE UP (ref 0.5–1.3)
EOSINOPHIL # BLD AUTO: 0 K/UL — SIGNIFICANT CHANGE UP (ref 0–0.5)
EOSINOPHIL NFR BLD AUTO: 0 % — SIGNIFICANT CHANGE UP (ref 0–6)
GLUCOSE SERPL-MCNC: 264 MG/DL — HIGH (ref 70–99)
HCT VFR BLD CALC: 39.8 % — SIGNIFICANT CHANGE UP (ref 34.5–45)
HGB BLD-MCNC: 13.1 G/DL — SIGNIFICANT CHANGE UP (ref 11.5–15.5)
IMM GRANULOCYTES NFR BLD AUTO: 1 % — SIGNIFICANT CHANGE UP (ref 0–1.5)
LYMPHOCYTES # BLD AUTO: 1.95 K/UL — SIGNIFICANT CHANGE UP (ref 1–3.3)
LYMPHOCYTES # BLD AUTO: 14.4 % — SIGNIFICANT CHANGE UP (ref 13–44)
MAGNESIUM SERPL-MCNC: 2.3 MG/DL — SIGNIFICANT CHANGE UP (ref 1.6–2.6)
MCHC RBC-ENTMCNC: 27.6 PG — SIGNIFICANT CHANGE UP (ref 27–34)
MCHC RBC-ENTMCNC: 32.9 GM/DL — SIGNIFICANT CHANGE UP (ref 32–36)
MCV RBC AUTO: 83.8 FL — SIGNIFICANT CHANGE UP (ref 80–100)
MONOCYTES # BLD AUTO: 0.46 K/UL — SIGNIFICANT CHANGE UP (ref 0–0.9)
MONOCYTES NFR BLD AUTO: 3.4 % — SIGNIFICANT CHANGE UP (ref 2–14)
NEUTROPHILS # BLD AUTO: 10.96 K/UL — HIGH (ref 1.8–7.4)
NEUTROPHILS NFR BLD AUTO: 81.1 % — HIGH (ref 43–77)
PHOSPHATE SERPL-MCNC: 2.4 MG/DL — LOW (ref 2.5–4.5)
PLATELET # BLD AUTO: 290 K/UL — SIGNIFICANT CHANGE UP (ref 150–400)
POTASSIUM SERPL-MCNC: 4.4 MMOL/L — SIGNIFICANT CHANGE UP (ref 3.5–5.3)
POTASSIUM SERPL-SCNC: 4.4 MMOL/L — SIGNIFICANT CHANGE UP (ref 3.5–5.3)
PROT SERPL-MCNC: 7.5 G/DL — SIGNIFICANT CHANGE UP (ref 6–8.3)
RBC # BLD: 4.75 M/UL — SIGNIFICANT CHANGE UP (ref 3.8–5.2)
RBC # FLD: 13.8 % — SIGNIFICANT CHANGE UP (ref 10.3–14.5)
SODIUM SERPL-SCNC: 138 MMOL/L — SIGNIFICANT CHANGE UP (ref 135–145)
WBC # BLD: 13.52 K/UL — HIGH (ref 3.8–10.5)
WBC # FLD AUTO: 13.52 K/UL — HIGH (ref 3.8–10.5)

## 2018-07-11 PROCEDURE — 99233 SBSQ HOSP IP/OBS HIGH 50: CPT

## 2018-07-11 PROCEDURE — 99232 SBSQ HOSP IP/OBS MODERATE 35: CPT

## 2018-07-11 RX ORDER — DIPHENHYDRAMINE HCL 50 MG
50 CAPSULE ORAL ONCE
Qty: 0 | Refills: 0 | Status: DISCONTINUED | OUTPATIENT
Start: 2018-07-11 | End: 2018-07-11

## 2018-07-11 RX ORDER — DIPHENHYDRAMINE HCL 50 MG
50 CAPSULE ORAL ONCE
Qty: 0 | Refills: 0 | Status: COMPLETED | OUTPATIENT
Start: 2018-07-11 | End: 2018-07-11

## 2018-07-11 RX ORDER — DIPHENHYDRAMINE HCL 50 MG
25 CAPSULE ORAL EVERY 8 HOURS
Qty: 0 | Refills: 0 | Status: DISCONTINUED | OUTPATIENT
Start: 2018-07-11 | End: 2018-07-12

## 2018-07-11 RX ORDER — PANTOPRAZOLE SODIUM 20 MG/1
40 TABLET, DELAYED RELEASE ORAL ONCE
Qty: 0 | Refills: 0 | Status: COMPLETED | OUTPATIENT
Start: 2018-07-11 | End: 2018-07-11

## 2018-07-11 RX ORDER — INSULIN LISPRO 100/ML
25 VIAL (ML) SUBCUTANEOUS
Qty: 0 | Refills: 0 | Status: DISCONTINUED | OUTPATIENT
Start: 2018-07-11 | End: 2018-07-12

## 2018-07-11 RX ADMIN — Medication 1 TABLET(S): at 17:17

## 2018-07-11 RX ADMIN — Medication 9: at 08:14

## 2018-07-11 RX ADMIN — Medication 2 SPRAY(S): at 12:27

## 2018-07-11 RX ADMIN — Medication 50 MILLIGRAM(S): at 19:24

## 2018-07-11 RX ADMIN — Medication 40 MILLIGRAM(S): at 06:18

## 2018-07-11 RX ADMIN — SIMVASTATIN 20 MILLIGRAM(S): 20 TABLET, FILM COATED ORAL at 21:21

## 2018-07-11 RX ADMIN — Medication 4 MILLIGRAM(S): at 12:27

## 2018-07-11 RX ADMIN — Medication 25 UNIT(S): at 17:18

## 2018-07-11 RX ADMIN — ALBUTEROL 2.5 MILLIGRAM(S): 90 AEROSOL, METERED ORAL at 11:42

## 2018-07-11 RX ADMIN — GENTAMICIN SULFATE 1 DROP(S): 3 SOLUTION/ DROPS OPHTHALMIC at 17:17

## 2018-07-11 RX ADMIN — Medication 2 GRAM(S): at 12:28

## 2018-07-11 RX ADMIN — OXYCODONE AND ACETAMINOPHEN 1 TABLET(S): 5; 325 TABLET ORAL at 05:00

## 2018-07-11 RX ADMIN — Medication 25 UNIT(S): at 12:26

## 2018-07-11 RX ADMIN — PANTOPRAZOLE SODIUM 40 MILLIGRAM(S): 20 TABLET, DELAYED RELEASE ORAL at 06:18

## 2018-07-11 RX ADMIN — TIOTROPIUM BROMIDE 1 CAPSULE(S): 18 CAPSULE ORAL; RESPIRATORY (INHALATION) at 08:13

## 2018-07-11 RX ADMIN — Medication 40 MILLIGRAM(S): at 17:17

## 2018-07-11 RX ADMIN — GENTAMICIN SULFATE 1 DROP(S): 3 SOLUTION/ DROPS OPHTHALMIC at 13:43

## 2018-07-11 RX ADMIN — ALBUTEROL 2.5 MILLIGRAM(S): 90 AEROSOL, METERED ORAL at 15:32

## 2018-07-11 RX ADMIN — MONTELUKAST 10 MILLIGRAM(S): 4 TABLET, CHEWABLE ORAL at 12:27

## 2018-07-11 RX ADMIN — BUDESONIDE AND FORMOTEROL FUMARATE DIHYDRATE 2 PUFF(S): 160; 4.5 AEROSOL RESPIRATORY (INHALATION) at 19:24

## 2018-07-11 RX ADMIN — OXYCODONE AND ACETAMINOPHEN 1 TABLET(S): 5; 325 TABLET ORAL at 04:00

## 2018-07-11 RX ADMIN — CARVEDILOL PHOSPHATE 25 MILLIGRAM(S): 80 CAPSULE, EXTENDED RELEASE ORAL at 17:17

## 2018-07-11 RX ADMIN — OXYCODONE AND ACETAMINOPHEN 1 TABLET(S): 5; 325 TABLET ORAL at 00:00

## 2018-07-11 RX ADMIN — Medication 81 MILLIGRAM(S): at 12:28

## 2018-07-11 RX ADMIN — PANTOPRAZOLE SODIUM 40 MILLIGRAM(S): 20 TABLET, DELAYED RELEASE ORAL at 19:24

## 2018-07-11 RX ADMIN — Medication 1 TABLET(S): at 12:33

## 2018-07-11 RX ADMIN — INSULIN GLARGINE 50 UNIT(S): 100 INJECTION, SOLUTION SUBCUTANEOUS at 08:13

## 2018-07-11 RX ADMIN — CARVEDILOL PHOSPHATE 25 MILLIGRAM(S): 80 CAPSULE, EXTENDED RELEASE ORAL at 06:18

## 2018-07-11 RX ADMIN — ALBUTEROL 2.5 MILLIGRAM(S): 90 AEROSOL, METERED ORAL at 23:34

## 2018-07-11 RX ADMIN — Medication 62.5 MILLIMOLE(S): at 18:09

## 2018-07-11 RX ADMIN — LORATADINE 10 MILLIGRAM(S): 10 TABLET ORAL at 12:27

## 2018-07-11 RX ADMIN — LISINOPRIL 20 MILLIGRAM(S): 2.5 TABLET ORAL at 06:18

## 2018-07-11 RX ADMIN — GENTAMICIN SULFATE 1 DROP(S): 3 SOLUTION/ DROPS OPHTHALMIC at 10:45

## 2018-07-11 RX ADMIN — Medication 18: at 12:27

## 2018-07-11 RX ADMIN — FLUCONAZOLE 100 MILLIGRAM(S): 150 TABLET ORAL at 12:27

## 2018-07-11 RX ADMIN — GENTAMICIN SULFATE 1 DROP(S): 3 SOLUTION/ DROPS OPHTHALMIC at 06:18

## 2018-07-11 RX ADMIN — GENTAMICIN SULFATE 1 DROP(S): 3 SOLUTION/ DROPS OPHTHALMIC at 01:13

## 2018-07-11 RX ADMIN — OXYCODONE AND ACETAMINOPHEN 1 TABLET(S): 5; 325 TABLET ORAL at 11:50

## 2018-07-11 RX ADMIN — ALBUTEROL 2.5 MILLIGRAM(S): 90 AEROSOL, METERED ORAL at 05:50

## 2018-07-11 RX ADMIN — Medication 4: at 22:17

## 2018-07-11 RX ADMIN — ALBUTEROL 2.5 MILLIGRAM(S): 90 AEROSOL, METERED ORAL at 20:34

## 2018-07-11 RX ADMIN — ALBUTEROL 2.5 MILLIGRAM(S): 90 AEROSOL, METERED ORAL at 17:46

## 2018-07-11 RX ADMIN — ENOXAPARIN SODIUM 40 MILLIGRAM(S): 100 INJECTION SUBCUTANEOUS at 10:45

## 2018-07-11 RX ADMIN — GENTAMICIN SULFATE 1 DROP(S): 3 SOLUTION/ DROPS OPHTHALMIC at 21:21

## 2018-07-11 RX ADMIN — INSULIN GLARGINE 50 UNIT(S): 100 INJECTION, SOLUTION SUBCUTANEOUS at 22:16

## 2018-07-11 RX ADMIN — OXYCODONE AND ACETAMINOPHEN 1 TABLET(S): 5; 325 TABLET ORAL at 10:50

## 2018-07-11 RX ADMIN — ALBUTEROL 2.5 MILLIGRAM(S): 90 AEROSOL, METERED ORAL at 08:36

## 2018-07-11 RX ADMIN — BUDESONIDE AND FORMOTEROL FUMARATE DIHYDRATE 2 PUFF(S): 160; 4.5 AEROSOL RESPIRATORY (INHALATION) at 08:13

## 2018-07-11 RX ADMIN — Medication 1 TABLET(S): at 06:18

## 2018-07-11 RX ADMIN — Medication 100 MILLIGRAM(S): at 04:00

## 2018-07-11 RX ADMIN — ALBUTEROL 2.5 MILLIGRAM(S): 90 AEROSOL, METERED ORAL at 02:24

## 2018-07-11 RX ADMIN — Medication 12: at 17:18

## 2018-07-11 RX ADMIN — Medication 20 UNIT(S): at 08:14

## 2018-07-11 NOTE — CHART NOTE - NSCHARTNOTEFT_GEN_A_CORE
Called by RN, patient believes she is having an allergic reaction. Seen and examined at bedside. Patient visably itchy and uncomfortable, scratching her back on wall. Patient complaining of pruritis all over as well as tongue swelling. Patient states she had a similar reaction around the same time yesterday which was resolved with IV benedryl and protonix. Patient has history of anaphylactoid reactions.     Vital Signs Last 24 Hrs  T(C): 36.7 (11 Jul 2018 14:37), Max: 37.3 (10 Jul 2018 20:08)  T(F): 98.1 (11 Jul 2018 14:37), Max: 99.2 (10 Jul 2018 20:08)  HR: 89 (11 Jul 2018 17:48) (78 - 103)  BP: 115/71 (11 Jul 2018 17:16) (107/59 - 142/83)  BP(mean): 80 (11 Jul 2018 00:00) (77 - 81)  RR: 17 (11 Jul 2018 14:37) (17 - 28)  SpO2: 94% (11 Jul 2018 17:48) (91% - 95%)    Physical Exam:  General: obese female, uncomfortable appearing.   HEAD:  Atraumatic, Normocephalic  EYES: EOMI, PERRLA, conjunctiva and sclera clear  ENMT: No tonsillar erythema, exudates, or enlargement; Moist mucous membranes, Good dentition, No lesions  NECK: Supple, No JVD  NERVOUS SYSTEM:  Alert & Oriented X3, Good concentration; Motor Strength 5/5 B/L upper and lower extremities  CHEST/LUNG: coarse b/l breath sounds and moderate to severe wheeze on auscultation  HEART: Regular rate and rhythm; No murmurs, rubs, or gallops  ABDOMEN: Soft, Nontender, Nondistended; Bowel sounds present  EXTREMITIES:  2+ Peripheral Pulses, No clubbing, cyanosis, or edema  SKIN: No rashes or lesions    Assessment and Plan    48 year old female with PMH of asthma s/p multiple intubations, right heart failure, DM2, Essential HTN, MTHFR deficiency and homocysteinuria, Prinzmetal angina admitted with acute severe asthma exacerbation likely 2/2 viral URI with possible b/l otitis media R/O sinusitis. Patient complaining of allergic reaction.     - IV Benedryl 50mg x1 STAT    - 40mg PO protonix     - Patient and plan discussed and agreed upon with Dr. Gonzalez    - placed on remote telemetry with continuous pulse ox to monitor for any changes.     - Will follow, RN to call with any changes.

## 2018-07-11 NOTE — PROGRESS NOTE ADULT - SUBJECTIVE AND OBJECTIVE BOX
Patient is a 48y old  Female who presents with a chief complaint of Worsening shortness of breath (08 Jul 2018 20:28)      INTERVAL HPI: Pt seen and examined. States that her breathing is more labored this morning as shes been moving around more. Denies any other acute complaints at this time.     OVERNIGHT EVENTS: none noted  T(F): 98.1 (07-11-18 @ 14:37), Max: 99.2 (07-10-18 @ 20:08)  HR: 80 (07-11-18 @ 17:16) (78 - 103)  BP: 115/71 (07-11-18 @ 17:16) (107/59 - 142/83)  RR: 17 (07-11-18 @ 14:37) (17 - 29)  SpO2: 93% (07-11-18 @ 15:36) (91% - 95%)  I&O's Summary    10 Jul 2018 07:01  -  11 Jul 2018 07:00  --------------------------------------------------------  IN: 1290 mL / OUT: 1120 mL / NET: 170 mL        REVIEW OF SYSTEMS: 12 systems noncontributory except that stated in hpi    PHYSICAL EXAM:  GENERAL: in resp distress able to speak in short sentences, morbidly obese  HEAD:  Atraumatic, Normocephalic  EYES: EOMI, PERRLA, conjunctiva and sclera clear  ENMT: No tonsillar erythema, exudates, or enlargement; Moist mucous membranes, Good dentition, No lesions  NECK: Supple, No JVD  NERVOUS SYSTEM:  Alert & Oriented X3, Good concentration; Motor Strength 5/5 B/L upper and lower extremities  CHEST/LUNG: coarse b/l breath sounds and moderate to severe wheeze on auscultation  HEART: Regular rate and rhythm; No murmurs, rubs, or gallops  ABDOMEN: Soft, Nontender, Nondistended; Bowel sounds present  EXTREMITIES:  2+ Peripheral Pulses, No clubbing, cyanosis, or edema  SKIN: No rashes or lesions    LABS:                        13.1   13.52 )-----------( 290      ( 11 Jul 2018 08:53 )             39.8     07-11    138  |  104  |  18  ----------------------------<  264<H>  4.4   |  27  |  0.75    Ca    8.4<L>      11 Jul 2018 08:53  Phos  2.4     07-11  Mg     2.3     07-11    TPro  7.5  /  Alb  3.2<L>  /  TBili  0.3  /  DBili  x   /  AST  23  /  ALT  50  /  AlkPhos  121<H>  07-11        CAPILLARY BLOOD GLUCOSE      POCT Blood Glucose.: 346 mg/dL (11 Jul 2018 17:05)  POCT Blood Glucose.: 419 mg/dL (11 Jul 2018 12:00)  POCT Blood Glucose.: 427 mg/dL (11 Jul 2018 11:54)  POCT Blood Glucose.: 287 mg/dL (11 Jul 2018 07:35)  POCT Blood Glucose.: 323 mg/dL (10 Jul 2018 22:27)      07-08 @ 21:45   No Strep pyogenes (Group A) isolated  --  --  07-08 @ 17:26   No growth to date.  --  --          MEDICATIONS  (STANDING):  ALBUTerol    0.083% 2.5 milliGRAM(s) Nebulizer every 3 hours  amoxicillin  875 milliGRAM(s)/clavulanate 1 Tablet(s) Oral two times a day  aspirin enteric coated 81 milliGRAM(s) Oral daily  buDESOnide 160 MICROgram(s)/formoterol 4.5 MICROgram(s) Inhaler 2 Puff(s) Inhalation two times a day  carvedilol 25 milliGRAM(s) Oral every 12 hours  dextrose 5%. 1000 milliLiter(s) (50 mL/Hr) IV Continuous <Continuous>  dextrose 50% Injectable 12.5 Gram(s) IV Push once  dextrose 50% Injectable 25 Gram(s) IV Push once  dextrose 50% Injectable 25 Gram(s) IV Push once  enoxaparin Injectable 40 milliGRAM(s) SubCutaneous every 12 hours  fluconAZOLE   Tablet 100 milliGRAM(s) Oral daily  fluticasone propionate 50 MICROgram(s)/spray Nasal Spray 2 Spray(s) Both Nostrils daily  folic acid 4 milliGRAM(s) Oral daily  gentamicin 0.3% Ophthalmic Solution 1 Drop(s) Both EYES every 4 hours  insulin glargine Injectable (LANTUS) 50 Unit(s) SubCutaneous at bedtime  insulin glargine Injectable (LANTUS) 50 Unit(s) SubCutaneous every morning  insulin lispro (HumaLOG) corrective regimen sliding scale   SubCutaneous at bedtime  insulin lispro (HumaLOG) corrective regimen sliding scale   SubCutaneous three times a day before meals  insulin lispro Injectable (HumaLOG) 25 Unit(s) SubCutaneous three times a day before meals  lactobacillus acidophilus 1 Tablet(s) Oral daily  lisinopril 20 milliGRAM(s) Oral daily  loratadine 10 milliGRAM(s) Oral daily  montelukast 10 milliGRAM(s) Oral daily  omega-3-Acid Ethyl Esters 2 Gram(s) Oral daily  pantoprazole    Tablet 40 milliGRAM(s) Oral before breakfast  predniSONE   Tablet 40 milliGRAM(s) Oral daily  simvastatin 20 milliGRAM(s) Oral at bedtime  tiotropium 18 MICROgram(s) Capsule 1 Capsule(s) Inhalation daily    MEDICATIONS  (PRN):  artificial  tears Solution 1 Drop(s) Both EYES every 4 hours PRN Dry Eyes  dextrose 40% Gel 15 Gram(s) Oral once PRN Blood Glucose LESS THAN 70 milliGRAM(s)/deciliter  diphenhydrAMINE   Capsule 100 milliGRAM(s) Oral at bedtime PRN Insomnia  diphenhydrAMINE   Capsule 50 milliGRAM(s) Oral once PRN Insomnia  glucagon  Injectable 1 milliGRAM(s) IntraMuscular once PRN Glucose LESS THAN 70 milligrams/deciliter  HYDROcodone/homatropine Syrup 5 milliLiter(s) Oral every 6 hours PRN Cough  ondansetron Injectable 4 milliGRAM(s) IV Push every 6 hours PRN Nausea and/or Vomiting  oxyCODONE    5 mG/acetaminophen 325 mG 1 Tablet(s) Oral every 4 hours PRN Moderate Pain (4 - 6)

## 2018-07-11 NOTE — PROGRESS NOTE ADULT - SUBJECTIVE AND OBJECTIVE BOX
Patient is a 48y old  Female who presents with a chief complaint of Worsening shortness of breath (08 Jul 2018 20:28)      INTERVAL HPI/OVERNIGHT EVENTS:    Feels better with less shortness of breath    MEDICATIONS  (STANDING):  ALBUTerol    0.083% 2.5 milliGRAM(s) Nebulizer every 3 hours  amoxicillin  875 milliGRAM(s)/clavulanate 1 Tablet(s) Oral two times a day  aspirin enteric coated 81 milliGRAM(s) Oral daily  buDESOnide 160 MICROgram(s)/formoterol 4.5 MICROgram(s) Inhaler 2 Puff(s) Inhalation two times a day  carvedilol 25 milliGRAM(s) Oral every 12 hours  dextrose 5%. 1000 milliLiter(s) (50 mL/Hr) IV Continuous <Continuous>  dextrose 50% Injectable 12.5 Gram(s) IV Push once  dextrose 50% Injectable 25 Gram(s) IV Push once  dextrose 50% Injectable 25 Gram(s) IV Push once  enoxaparin Injectable 40 milliGRAM(s) SubCutaneous every 12 hours  fluconAZOLE   Tablet 100 milliGRAM(s) Oral daily  fluticasone propionate 50 MICROgram(s)/spray Nasal Spray 2 Spray(s) Both Nostrils daily  folic acid 4 milliGRAM(s) Oral daily  gentamicin 0.3% Ophthalmic Solution 1 Drop(s) Both EYES every 4 hours  insulin glargine Injectable (LANTUS) 50 Unit(s) SubCutaneous at bedtime  insulin glargine Injectable (LANTUS) 50 Unit(s) SubCutaneous every morning  insulin lispro (HumaLOG) corrective regimen sliding scale   SubCutaneous at bedtime  insulin lispro (HumaLOG) corrective regimen sliding scale   SubCutaneous three times a day before meals  insulin lispro Injectable (HumaLOG) 20 Unit(s) SubCutaneous three times a day before meals  lactobacillus acidophilus 1 Tablet(s) Oral daily  lisinopril 20 milliGRAM(s) Oral daily  loratadine 10 milliGRAM(s) Oral daily  methylPREDNISolone sodium succinate Injectable 40 milliGRAM(s) IV Push every 12 hours  montelukast 10 milliGRAM(s) Oral daily  omega-3-Acid Ethyl Esters 2 Gram(s) Oral daily  pantoprazole    Tablet 40 milliGRAM(s) Oral before breakfast  simvastatin 20 milliGRAM(s) Oral at bedtime  tiotropium 18 MICROgram(s) Capsule 1 Capsule(s) Inhalation daily      MEDICATIONS  (PRN):  artificial  tears Solution 1 Drop(s) Both EYES every 4 hours PRN Dry Eyes  dextrose 40% Gel 15 Gram(s) Oral once PRN Blood Glucose LESS THAN 70 milliGRAM(s)/deciliter  diphenhydrAMINE   Capsule 100 milliGRAM(s) Oral at bedtime PRN Insomnia  diphenhydrAMINE   Capsule 50 milliGRAM(s) Oral once PRN Insomnia  glucagon  Injectable 1 milliGRAM(s) IntraMuscular once PRN Glucose LESS THAN 70 milligrams/deciliter  HYDROcodone/homatropine Syrup 5 milliLiter(s) Oral every 6 hours PRN Cough  ondansetron Injectable 4 milliGRAM(s) IV Push every 6 hours PRN Nausea and/or Vomiting  oxyCODONE    5 mG/acetaminophen 325 mG 1 Tablet(s) Oral every 4 hours PRN Moderate Pain (4 - 6)      Allergies    Depakote (Unknown)  Diprivan (Nausea)  gadolinium containing compounds (Angioedema)  latex (Unknown)  levofloxacin (Anaphylaxis (Mod to Severe))  nutrasweet (Unknown)  Ofirmev (Anaphylaxis)  propofol (Unknown)    Intolerances        PAST MEDICAL & SURGICAL HISTORY:  Osteoporosis  Gastric ulcer  HLD (hyperlipidemia)  Essential hypertension, hypertension with unspecified goal  Diabetes mellitus type 2 in obese  Migraine  MTHFR (methylene THF reductase) deficiency and homocystinuria  Polycystic disease, ovaries  CVA (cerebral vascular accident)  GERD (gastroesophageal reflux disease)  Obesity  Prinzmetal Angina  Asthma: intubated in the past  H/O ovarian cystectomy  Salpingo-oophoritis: oophrectomy  History of tonsillectomy  History of Cholecystectomy  Status Post Nissen Fundoplication (with Gastrostomy Tube Placement)      Vital Signs Last 24 Hrs  T(C): 37.3 (10 Jul 2018 20:08), Max: 37.3 (10 Jul 2018 20:08)  T(F): 99.2 (10 Jul 2018 20:08), Max: 99.2 (10 Jul 2018 20:08)  HR: 80 (10 Jul 2018 23:30) (77 - 107)  BP: 115/57 (10 Jul 2018 23:00) (93/51 - 117/56)  BP(mean): 80 (10 Jul 2018 23:00) (62 - 81)  RR: 28 (10 Jul 2018 23:00) (16 - 34)  SpO2: 94% (10 Jul 2018 23:30) (91% - 97%)    PHYSICAL EXAMINATION:    GENERAL: The patient is awake and alert in no apparent distress.     HEENT: Head is normocephalic and atraumatic. Extraocular muscles are intact. Mucous membranes are moist.    NECK: Supple.    LUNGS: bilateral expiratory wheezes    HEART: Regular rate and rhythm without murmur.    ABDOMEN: obese, no tenderness    EXTREMITIES: positive edema bilateral    NEUROLOGIC: Grossly intact.    SKIN: No ulceration or induration present.      LABS:                        12.2   17.18 )-----------( 196      ( 10 Jul 2018 06:23 )             38.1     07-10    140  |  109<H>  |  20  ----------------------------<  349<H>  4.9   |  22  |  0.78    Ca    8.0<L>      10 Jul 2018 06:23  Phos  2.5     07-10  Mg     2.2     07-10    TPro  7.0  /  Alb  2.8<L>  /  TBili  0.2  /  DBili  x   /  AST  20  /  ALT  48  /  AlkPhos  120  07-10        ABG - ( 09 Jul 2018 09:02 )  pH, Arterial: 7.34  pH, Blood: x     /  pCO2: 36    /  pO2: 89    / HCO3: 20    / Base Excess: -5.4  /  SaO2: 96                CARDIAC MARKERS ( 09 Jul 2018 22:22 )  <.015 ng/mL / x     / x     / x     / x      CARDIAC MARKERS ( 09 Jul 2018 15:00 )  <.015 ng/mL / x     / x     / x     / x      CARDIAC MARKERS ( 09 Jul 2018 06:41 )  <.015 ng/mL / x     / x     / x     / x              Lactate, Blood: 1.8 mmol/L (07-10-18 @ 06:23)    Procalcitonin, Serum: <0.05 (07-08-18 @ 16:20)      MICROBIOLOGY:  Culture Results:   No Strep pyogenes (Group A) isolated (07-08 @ 21:45)  Culture Results:   No growth to date. (07-08 @ 17:26)  Culture Results:   No growth to date. (07-08 @ 17:26)      RADIOLOGY & ADDITIONAL STUDIES:    Assessment:    Severe Asthma with exacerbation  Morbid Obesity  Diabetes    Plan:    Continue steroids + bronchodilators + antibiotics

## 2018-07-11 NOTE — PROGRESS NOTE ADULT - ASSESSMENT
48 year old female with PMH of asthma s/p multiple intubations (last intubation 12/2016), right heart failure, DM2, Essential HTN, HTN, MTHFR deficiency and homocysteinuria, Prinzmetal angina, migraines, osteoporosis, and gastric ulcers presents with worsening shortness of breath. Patient states she started experiencing URI symptoms about 1 week ago. She reports intermittent fevers T max of 101.8, chills, fatigue, nausea, sinus pressure, lightheadedness, b/l ear pain, throat pain, bilateral chest wall discomfort, SOB, productive cough with yellow sputum, rhinorrhea with yellow sputum, and decreased appetite.  She followed up with her pulmonologist this past Thursday and was diagnosed with bilateral otitis media, sinusitis, and bronchitis.  She was started on Prednisone 60 mg and Augmentin BID.  Despite taking this along with a cough suppressant, decongestant, nsaids, and duonebs/xopenex, she did not have any improvement in symptoms.  She went to an OSH yesterday but after waiting over an hour in the waiting room left.  Denies V/D/C, abd pain, ext numbness/weakness, dysuria, hematuria.     She has had no further chest discomfort and is no evidence for an acute cardiac process. She has evidence of some continued bronchospasm is stable from a cardiovascular standpoint.  catheterization has been performed for these sxs previously, as recently as 2013, and she has not been found to have any significant cad    Recommend:  - Can d/c from cardiac standpoint and f/u with Dr Brownlee  -she does carry a diagnosis of Prinzmetal's angina, and has been treated with ntg  -although objective evidence of ischemia is lacking, there seems to be little downside to symptomatic treatment with ntg sl as needed  -Continue coreg 25 bid  - ASA  - DVT prophylaxis  - pulm F/U  -will follow

## 2018-07-11 NOTE — PROVIDER CONTACT NOTE (OTHER) - SITUATION
Intern called due to patient complaint of itching and possible hives. Patient felt like she was having allergic reaction

## 2018-07-11 NOTE — PROGRESS NOTE ADULT - SUBJECTIVE AND OBJECTIVE BOX
CAPILLARY BLOOD GLUCOSE      POCT Blood Glucose.: 287 mg/dL (11 Jul 2018 07:35)  POCT Blood Glucose.: 323 mg/dL (10 Jul 2018 22:27)  POCT Blood Glucose.: 337 mg/dL (10 Jul 2018 17:02)  POCT Blood Glucose.: 344 mg/dL (10 Jul 2018 12:18)      Vital Signs Last 24 Hrs  T(C): 36.6 (11 Jul 2018 04:40), Max: 37.3 (10 Jul 2018 20:08)  T(F): 97.9 (11 Jul 2018 04:40), Max: 99.2 (10 Jul 2018 20:08)  HR: 100 (11 Jul 2018 06:05) (78 - 103)  BP: 142/83 (11 Jul 2018 04:40) (102/56 - 142/83)  BP(mean): 80 (11 Jul 2018 00:00) (62 - 81)  RR: 19 (11 Jul 2018 04:40) (17 - 34)  SpO2: 92% (11 Jul 2018 06:05) (91% - 95%)    General: WN/WD NAD  Respiratory: diminished breath sounds  CV: RRR, S1S2, no murmurs, rubs or gallops  Abdominal: Soft, NT, ND +BS, Last BM  Extremities: No edema, + peripheral pulses    Hemoglobin A1C, Whole Blood: 8.2 % (07-09 @ 07:35)   07-10    140  |  109<H>  |  20  ----------------------------<  349<H>  4.9   |  22  |  0.78    Ca    8.0<L>      10 Jul 2018 06:23  Phos  2.5     07-10  Mg     2.2     07-10    TPro  7.0  /  Alb  2.8<L>  /  TBili  0.2  /  DBili  x   /  AST  20  /  ALT  48  /  AlkPhos  120  07-10      dextrose 40% Gel 15 Gram(s) Oral once PRN  dextrose 50% Injectable 12.5 Gram(s) IV Push once  dextrose 50% Injectable 25 Gram(s) IV Push once  dextrose 50% Injectable 25 Gram(s) IV Push once  glucagon  Injectable 1 milliGRAM(s) IntraMuscular once PRN  insulin glargine Injectable (LANTUS) 50 Unit(s) SubCutaneous at bedtime  insulin glargine Injectable (LANTUS) 50 Unit(s) SubCutaneous every morning  insulin lispro (HumaLOG) corrective regimen sliding scale   SubCutaneous at bedtime  insulin lispro (HumaLOG) corrective regimen sliding scale   SubCutaneous three times a day before meals  insulin lispro Injectable (HumaLOG) 20 Unit(s) SubCutaneous three times a day before meals  methylPREDNISolone sodium succinate Injectable 40 milliGRAM(s) IV Push every 12 hours  simvastatin 20 milliGRAM(s) Oral at bedtime

## 2018-07-11 NOTE — PROGRESS NOTE ADULT - PROBLEM SELECTOR PLAN 4
Controlled - continue with coreg and lisinopril with hold parameters

## 2018-07-11 NOTE — PROGRESS NOTE ADULT - PROBLEM SELECTOR PLAN 7
Stable - Continue with ASA and statin

## 2018-07-11 NOTE — PROGRESS NOTE ADULT - PROBLEM SELECTOR PLAN 5
Hold home meds  Hypoglycemic protocol, mod ISS, accuchecks qac qhs  Continue with lantus 50units BID and humalog 20units TID  Patient REFUSING carb cont dash/tlc diet  HbA1C: 8.2  consult dr. perlman, endocrine
Hold home meds  Hypoglycemic protocol, mod ISS, accuchecks qac qhs  Continue with lantus 50units BID and humalog 20units TID  Patient REFUSING carb con dash/tlc diet  F/U HbA1C  consult dr. perlman, endocrine
Hold home meds  Hypoglycemic protocol, mod ISS, accuchecks qac qhs  Continue with lantus 50units BID and humalog 20units TID  Patient REFUSING carb con dash/tlc diet  F/U HbA1C  consult dr. perlman, endocrine

## 2018-07-11 NOTE — PROGRESS NOTE ADULT - SUBJECTIVE AND OBJECTIVE BOX
Patient is a 48y old  Female who presents with a chief complaint of Worsening shortness of breath (08 Jul 2018 20:28)      INTERVAL HPI/OVERNIGHT EVENTS:    Feels better today but had episode of severe wheezing and shortness of breath earlier    MEDICATIONS  (STANDING):  ALBUTerol    0.083% 2.5 milliGRAM(s) Nebulizer every 3 hours  amoxicillin  875 milliGRAM(s)/clavulanate 1 Tablet(s) Oral two times a day  aspirin enteric coated 81 milliGRAM(s) Oral daily  buDESOnide 160 MICROgram(s)/formoterol 4.5 MICROgram(s) Inhaler 2 Puff(s) Inhalation two times a day  carvedilol 25 milliGRAM(s) Oral every 12 hours  dextrose 5%. 1000 milliLiter(s) (50 mL/Hr) IV Continuous <Continuous>  dextrose 50% Injectable 12.5 Gram(s) IV Push once  dextrose 50% Injectable 25 Gram(s) IV Push once  dextrose 50% Injectable 25 Gram(s) IV Push once  enoxaparin Injectable 40 milliGRAM(s) SubCutaneous every 12 hours  fluconAZOLE   Tablet 100 milliGRAM(s) Oral daily  fluticasone propionate 50 MICROgram(s)/spray Nasal Spray 2 Spray(s) Both Nostrils daily  folic acid 4 milliGRAM(s) Oral daily  gentamicin 0.3% Ophthalmic Solution 1 Drop(s) Both EYES every 4 hours  insulin glargine Injectable (LANTUS) 50 Unit(s) SubCutaneous at bedtime  insulin glargine Injectable (LANTUS) 50 Unit(s) SubCutaneous every morning  insulin lispro (HumaLOG) corrective regimen sliding scale   SubCutaneous at bedtime  insulin lispro (HumaLOG) corrective regimen sliding scale   SubCutaneous three times a day before meals  insulin lispro Injectable (HumaLOG) 25 Unit(s) SubCutaneous three times a day before meals  lactobacillus acidophilus 1 Tablet(s) Oral daily  lisinopril 20 milliGRAM(s) Oral daily  loratadine 10 milliGRAM(s) Oral daily  montelukast 10 milliGRAM(s) Oral daily  omega-3-Acid Ethyl Esters 2 Gram(s) Oral daily  pantoprazole    Tablet 40 milliGRAM(s) Oral before breakfast  predniSONE   Tablet 40 milliGRAM(s) Oral daily  simvastatin 20 milliGRAM(s) Oral at bedtime  tiotropium 18 MICROgram(s) Capsule 1 Capsule(s) Inhalation daily      MEDICATIONS  (PRN):  artificial  tears Solution 1 Drop(s) Both EYES every 4 hours PRN Dry Eyes  dextrose 40% Gel 15 Gram(s) Oral once PRN Blood Glucose LESS THAN 70 milliGRAM(s)/deciliter  diphenhydrAMINE   Capsule 100 milliGRAM(s) Oral at bedtime PRN Insomnia  diphenhydrAMINE   Capsule 25 milliGRAM(s) Oral every 8 hours PRN Rash and/or Itching  diphenhydrAMINE   Capsule 50 milliGRAM(s) Oral once PRN Insomnia  glucagon  Injectable 1 milliGRAM(s) IntraMuscular once PRN Glucose LESS THAN 70 milligrams/deciliter  HYDROcodone/homatropine Syrup 5 milliLiter(s) Oral every 6 hours PRN Cough  ondansetron Injectable 4 milliGRAM(s) IV Push every 6 hours PRN Nausea and/or Vomiting  oxyCODONE    5 mG/acetaminophen 325 mG 1 Tablet(s) Oral every 4 hours PRN Moderate Pain (4 - 6)      Allergies    Depakote (Unknown)  Diprivan (Nausea)  gadolinium containing compounds (Angioedema)  latex (Unknown)  levofloxacin (Anaphylaxis (Mod to Severe))  nutrasweet (Unknown)  Ofirmev (Anaphylaxis)  propofol (Unknown)    Intolerances        PAST MEDICAL & SURGICAL HISTORY:  Osteoporosis  Gastric ulcer  HLD (hyperlipidemia)  Essential hypertension, hypertension with unspecified goal  Diabetes mellitus type 2 in obese  Migraine  MTHFR (methylene THF reductase) deficiency and homocystinuria  Polycystic disease, ovaries  CVA (cerebral vascular accident)  GERD (gastroesophageal reflux disease)  Obesity  Prinzmetal Angina  Asthma: intubated in the past  H/O ovarian cystectomy  Salpingo-oophoritis: oophrectomy  History of tonsillectomy  History of Cholecystectomy  Status Post Nissen Fundoplication (with Gastrostomy Tube Placement)      Vital Signs Last 24 Hrs  T(C): 36.9 (11 Jul 2018 21:48), Max: 37.2 (11 Jul 2018 00:55)  T(F): 98.5 (11 Jul 2018 21:48), Max: 99 (11 Jul 2018 00:55)  HR: 81 (11 Jul 2018 21:48) (78 - 103)  BP: 113/70 (11 Jul 2018 21:48) (113/70 - 142/83)  BP(mean): 80 (11 Jul 2018 00:00) (80 - 80)  RR: 17 (11 Jul 2018 21:48) (17 - 23)  SpO2: 92% (11 Jul 2018 21:48) (92% - 95%)    PHYSICAL EXAMINATION:    GENERAL: The patient is awake and alert in no apparent distress.     HEENT: Head is normocephalic and atraumatic. Extraocular muscles are intact. Mucous membranes are moist.    NECK: Supple.    LUNGS: bilateral expiratory wheezes    HEART: Regular rate and rhythm without murmur.    ABDOMEN: obese with positive bowel sounds    EXTREMITIES: positive edema bilateral    NEUROLOGIC: Grossly intact.    SKIN: No ulceration or induration present.      LABS:                        13.1   13.52 )-----------( 290      ( 11 Jul 2018 08:53 )             39.8     07-11    138  |  104  |  18  ----------------------------<  264<H>  4.4   |  27  |  0.75    Ca    8.4<L>      11 Jul 2018 08:53  Phos  2.4     07-11  Mg     2.3     07-11    TPro  7.5  /  Alb  3.2<L>  /  TBili  0.3  /  DBili  x   /  AST  23  /  ALT  50  /  AlkPhos  121<H>  07-11                        MICROBIOLOGY:  Culture Results:   Normal Respiratory [place] present (07-10 @ 17:09)  Culture Results:   No Strep pyogenes (Group A) isolated (07-08 @ 21:45)  Culture Results:   No growth to date. (07-08 @ 17:26)  Culture Results:   No growth to date. (07-08 @ 17:26)      RADIOLOGY & ADDITIONAL STUDIES:    Assessment:    Severe Asthma with exacerbation  Morbid Obesity  Sinusitis  Diabetes Type 2    Plan:    Continue Prednisone + Augmentin + bronchodilators  Probable discharge in AM if stable

## 2018-07-11 NOTE — PROGRESS NOTE ADULT - SUBJECTIVE AND OBJECTIVE BOX
Follow up: SOB, C/P, Prinzmetal's angina    HPI:  48 year old female with PMH of asthma s/p multiple intubations (last intubation 2016), right heart failure, DM2, Essential HTN, HTN, MTHFR deficiency and homocysteinuria, Prinzmetal angina, migraines, osteoporosis, and gastric ulcers presents with worsening shortness of breath since yesterday. Patient states she started experiencing URI symptoms about 1 week ago. She reports intermittent fevers T max of 101.8, chills, fatigue, nausea, sinus pressure, lightheadedness, b/l ear pain, throat pain, bilateral chest wall discomfort, SOB, productive cough with yellow sputum, rhinorrhea with yellow sputum, and decreased appetite.  She followed up with her pulmonologist this past Thursday and was diagnosed with bilateral otitis media, sinusitis, and bronchitis.  She was started on Prednisone 60 mg and Augmentin BID.  Despite taking this along with a cough suppressant, decongestant, nsaids, and duonebs/xopenex.  Pt reports no improvement in symptoms.  She went to an OSH yesterday but after waiting over an hour in the waiting room left.  Denies V/D/C, abd pain, ext numbness/weakness, dysuria, hematuria.     Patient is feeling better from my chest pain perspective but still has some coughing and shortness of breath       PAST MEDICAL & SURGICAL HISTORY:  Osteoporosis  Gastric ulcer  HLD (hyperlipidemia)  Essential hypertension, hypertension with unspecified goal  Diabetes mellitus type 2 in obese  Migraine  MTHFR (methylene THF reductase) deficiency and homocystinuria  Polycystic disease, ovaries  CVA (cerebral vascular accident)  GERD (gastroesophageal reflux disease)  Obesity  Prinzmetal Angina  Asthma: intubated in the past  H/O ovarian cystectomy  Salpingo-oophoritis: oophrectomy  History of tonsillectomy  History of Cholecystectomy  Status Post Nissen Fundoplication (with Gastrostomy Tube Placement)      MEDICATIONS  (STANDING):  ALBUTerol    0.083% 2.5 milliGRAM(s) Nebulizer every 3 hours  amoxicillin  875 milliGRAM(s)/clavulanate 1 Tablet(s) Oral two times a day  aspirin enteric coated 81 milliGRAM(s) Oral daily  buDESOnide 160 MICROgram(s)/formoterol 4.5 MICROgram(s) Inhaler 2 Puff(s) Inhalation two times a day  carvedilol 25 milliGRAM(s) Oral every 12 hours  dextrose 5%. 1000 milliLiter(s) (50 mL/Hr) IV Continuous <Continuous>  dextrose 50% Injectable 12.5 Gram(s) IV Push once  dextrose 50% Injectable 25 Gram(s) IV Push once  dextrose 50% Injectable 25 Gram(s) IV Push once  enoxaparin Injectable 40 milliGRAM(s) SubCutaneous every 12 hours  fluconAZOLE   Tablet 100 milliGRAM(s) Oral daily  fluticasone propionate 50 MICROgram(s)/spray Nasal Spray 2 Spray(s) Both Nostrils daily  folic acid 4 milliGRAM(s) Oral daily  gentamicin 0.3% Ophthalmic Solution 1 Drop(s) Both EYES every 4 hours  insulin glargine Injectable (LANTUS) 50 Unit(s) SubCutaneous at bedtime  insulin glargine Injectable (LANTUS) 50 Unit(s) SubCutaneous every morning  insulin lispro (HumaLOG) corrective regimen sliding scale   SubCutaneous at bedtime  insulin lispro (HumaLOG) corrective regimen sliding scale   SubCutaneous three times a day before meals  insulin lispro Injectable (HumaLOG) 20 Unit(s) SubCutaneous three times a day before meals  lactobacillus acidophilus 1 Tablet(s) Oral daily  lisinopril 20 milliGRAM(s) Oral daily  loratadine 10 milliGRAM(s) Oral daily  methylPREDNISolone sodium succinate Injectable 40 milliGRAM(s) IV Push every 12 hours  montelukast 10 milliGRAM(s) Oral daily  omega-3-Acid Ethyl Esters 2 Gram(s) Oral daily  pantoprazole    Tablet 40 milliGRAM(s) Oral before breakfast  simvastatin 20 milliGRAM(s) Oral at bedtime  tiotropium 18 MICROgram(s) Capsule 1 Capsule(s) Inhalation daily    MEDICATIONS  (PRN):  artificial  tears Solution 1 Drop(s) Both EYES every 4 hours PRN Dry Eyes  dextrose 40% Gel 15 Gram(s) Oral once PRN Blood Glucose LESS THAN 70 milliGRAM(s)/deciliter  diphenhydrAMINE   Capsule 100 milliGRAM(s) Oral at bedtime PRN Insomnia  diphenhydrAMINE   Capsule 50 milliGRAM(s) Oral once PRN Insomnia  glucagon  Injectable 1 milliGRAM(s) IntraMuscular once PRN Glucose LESS THAN 70 milligrams/deciliter  HYDROcodone/homatropine Syrup 5 milliLiter(s) Oral every 6 hours PRN Cough  ondansetron Injectable 4 milliGRAM(s) IV Push every 6 hours PRN Nausea and/or Vomiting  oxyCODONE    5 mG/acetaminophen 325 mG 1 Tablet(s) Oral every 4 hours PRN Moderate Pain (4 - 6)      Vital Signs Last 24 Hrs  T(C): 36.6 (2018 04:40), Max: 37.3 (10 Jul 2018 20:08)  T(F): 97.9 (2018 04:40), Max: 99.2 (10 Jul 2018 20:08)  HR: 100 (2018 06:05) (78 - 103)  BP: 142/83 (2018 04:40) (102/56 - 142/83)  BP(mean): 80 (2018 00:00) (62 - 81)  RR: 19 (2018 04:40) (17 - 34)  SpO2: 92% (2018 06:05) (91% - 95%)    I&O's Summary    10 Jul 2018 07:01  -  2018 07:00  --------------------------------------------------------  IN: 1290 mL / OUT: 1120 mL / NET: 170 mL        PHYSICAL EXAM:    Constitutional: NAD, awake and alert, well-developed  Eyes:  Pupils round, no lesions  ENMT: no exudate or erythema  Pulmonary: scattered wheezing  Cardiovascular: PMI not palpable RRR normal S1 and S2, no murmurs, rubs, gallops or clicks  Gastrointestinal: Bowel Sounds present, soft, nontender.   Lymph: No cervical lymphadenopathy.  Neurological: Alert, no focal deficits  Skin: No rashes.  No cyanosis.  Psych:  Mood & affect appropriate   Ext: No lower ext edema      CARDIAC MARKERS ( 2018 22:22 )  <.015 ng/mL / x     / x     / x     / x      CARDIAC MARKERS ( 2018 15:00 )  <.015 ng/mL / x     / x     / x     / x                                12.2   17.18 )-----------( 196      ( 10 Jul 2018 06:23 )             38.1     07-10    140  |  109<H>  |  20  ----------------------------<  349<H>  4.9   |  22  |  0.78    Ca    8.0<L>      10 Jul 2018 06:23  Phos  2.5     07-10  Mg     2.2     07-10    TPro  7.0  /  Alb  2.8<L>  /  TBili  0.2  /  DBili  x   /  AST  20  /  ALT  48  /  AlkPhos  120  07-10    < from: 12 Lead ECG (18 @ 05:24) >    Ventricular Rate 105 BPM    Atrial Rate 105 BPM    P-R Interval 168 ms    QRS Duration 88 ms    Q-T Interval 348 ms    QTC Calculation(Bezet) 459 ms    P Axis 54 degrees    R Axis 38 degrees    T Axis 26 degrees    Diagnosis Line Sinus tachycardia  Otherwise normal ECG  When compared with ECG of 2018 05:24, (Unconfirmed)  No significant change was found  Confirmed by Mayank Pereira MD ([id number]) on 2018 3:10:00 PM    < end of copied text >    < from: Xray Chest 2 Views PA/Lat (18 @ 15:48) >    EXAM:  XR CHEST PA LAT 2V                            PROCEDURE DATE:  2018          INTERPRETATION:  CLINICAL INFORMATION: Cough, shortness of breath    EXAM: 2 views of chest performed on 2018    COMPARISON: 12/15/2017.    FINDINGS:  Thelungs are clear. No pleural effusion or pneumothorax.  The cardiac silhouette is normal.  The osseous structures are unremarkable.    IMPRESSION:  Clear lungs                NO BRODERICK VALENTINO, ATTENDING RADIOLOGIST  This document has been electronically signed. 2018  3:52PM                < end of copied text >  < from: Echocardiogram (16 @ 12:46) >    EXAM:  ECHOCARDIOGRAM (CARDIOL)                             PROCEDURE DATE:  2016                      INTERPRETATION:  Patient Height: 165.0 cm  Patient Weight: 95.0 kg  Systolic Pressure: 115 mmHg  Diastolic Pressure: 70 mmHg  BSA: 2.0 m^2  Procedure Details  A complete two-dimensional transthoracic echocardiogram was performed (2D,  M-mode, spectral and color flow doppler).  Study Quality: Poor.  The study was done portable in the ICU  Left Ventricle  Left ventricular hypertrophy present  Technically difficult study makes a detailed wall motion analysis not  possible. Grossly, the systolic function appears preserved.  The left ventricular ejection fraction is estimated to be 55-60%  Left Atrium  The left atrial size is normal.  Theleft atrial volume index is 19 cc/m2 (normal <34cc/m2)  Right Atrium  Right atrium not well visualized.  Right Ventricle  The right ventricle is not well visualized.  Aortic Valve  The number of aortic valve cusps cannot be discerned.  No aortic regurgitation noted.  No hemodynamically significant valvular aortic stenosis.  Mitral Valve  No mitral regurgitation noted.  Tricuspid Valve  No tricuspid regurgitation noted.  Pulmonic Valve  No pulmonic regurgitation noted.  There is no pulmonic stenosis.  Arteries and Venous System  No aortic root dilatation.  The inferior vena cava was not well visualized.  Pericardium / Pleura  There is no pericardial effusion.  Additional Comments  If clinically indicated, recommend a repeat transthoracic echocardiogram   with  the use of echo contrast in the echo laboratory for more detailed wall   motion  analysis, and two-dimensional valvular evaluation.  Doppler Measurements Calculations  MV E point: 90.9 cm/sec  MV A point: 119.4 cm/sec  MV E/A: 0.8  MV dec slope: 986.5 cm/sec  Ao V2 max: 179.5 cm/sec  Ao max P.9 mmHg  Ao max PG (full): 5.2 mmHg  GUSTAVO(V,A): 2.2 cm  GUSTAVO(V,D): 2.2 cm  LV max P.7 mmHg  LV mean P.7 mmHg  LV V1 max: 139 cm/sec  LV V1 mean: 102.6 cm/sec  LV V1VTI: 26.3 cm  SV(LVOT): 75.4 ml  SI(LVOT): 37.4 ml/m  MMode 2D Measurements Calculations  IVSd: 1.3 cm  LVIDd: 4.9 cm  LVIDs: 3.7 cm  LVPWd: 1.7 cm  IVS/LVPW: 0.7  FS: 25.7 %  EDV(Teich): 115.1 ml  ESV(Teich): 57.0 ml  LV mass(C)d: 317.1 grams  LV mass(C)dI: 157.3 grams/m  SI(cubed): 35.4 ml/m  Ao root diam: 3.2 cm  Ao root area: 8.3 cm  LA dimension: 3.5 cm  LA/Ao: 1.1  LVOT diam: 1.9 cm  LVOT area: 2.9 cm  LVOT area (M): 2.9 cm  EDV(MOD-sp4): 59.7 ml  EDV(MOD-sp2): 59 ml  Interpreting Physician:Franca Simmons DO electronically signed on   2016 13:06:31    IMPRESSION: Interpretation Summary  Left ventricular hypertrophy presentTechnically difficult study makes a   detailed wall motion analysis not possible.Grossly, the systolic   function   appears preserved.  The left ventricular ejection fraction is estimated   to be   55-60%The left atrial size is normal.Right atrium not well visualized.The   right ventricle is not well visualized.No aortic regurgitation noted.No   hemodynamically significant valvular aortic stenosis.No mitral   regurgitation   noted.No tricuspid regurgitation noted.No pulmonic regurgitation noted.No   aortic root dilatation.The inferior vena cava was not well   visualized.Thereis   no pericardial effusion.If clinically indicated, recommend a repeat   transthoracic echocardiogram with the use of echo contrast in the echo   laboratory for more detailed wall motion analysis, and two-dimensional   valvular evaluation.               "Thank you for the opportunity to participate in the care of this   patient."          FRANCA SIMMONS M.D., ATTENDING CARDIOLOGIST  This document has been electronically signed. May 27 2016  1:07P                          < end of copied text >

## 2018-07-12 ENCOUNTER — TRANSCRIPTION ENCOUNTER (OUTPATIENT)
Age: 48
End: 2018-07-12

## 2018-07-12 VITALS — OXYGEN SATURATION: 96 %

## 2018-07-12 DIAGNOSIS — J32.9 CHRONIC SINUSITIS, UNSPECIFIED: ICD-10-CM

## 2018-07-12 LAB
ANION GAP SERPL CALC-SCNC: 8 MMOL/L — SIGNIFICANT CHANGE UP (ref 5–17)
BASOPHILS # BLD AUTO: 0.02 K/UL — SIGNIFICANT CHANGE UP (ref 0–0.2)
BASOPHILS NFR BLD AUTO: 0.1 % — SIGNIFICANT CHANGE UP (ref 0–2)
BUN SERPL-MCNC: 21 MG/DL — SIGNIFICANT CHANGE UP (ref 7–23)
CALCIUM SERPL-MCNC: 8.1 MG/DL — LOW (ref 8.5–10.1)
CHLORIDE SERPL-SCNC: 103 MMOL/L — SIGNIFICANT CHANGE UP (ref 96–108)
CO2 SERPL-SCNC: 27 MMOL/L — SIGNIFICANT CHANGE UP (ref 22–31)
CREAT SERPL-MCNC: 0.74 MG/DL — SIGNIFICANT CHANGE UP (ref 0.5–1.3)
CULTURE RESULTS: SIGNIFICANT CHANGE UP
EOSINOPHIL # BLD AUTO: 0 K/UL — SIGNIFICANT CHANGE UP (ref 0–0.5)
EOSINOPHIL NFR BLD AUTO: 0 % — SIGNIFICANT CHANGE UP (ref 0–6)
GLUCOSE SERPL-MCNC: 285 MG/DL — HIGH (ref 70–99)
HCT VFR BLD CALC: 37.7 % — SIGNIFICANT CHANGE UP (ref 34.5–45)
HGB BLD-MCNC: 12.3 G/DL — SIGNIFICANT CHANGE UP (ref 11.5–15.5)
IMM GRANULOCYTES NFR BLD AUTO: 1.1 % — SIGNIFICANT CHANGE UP (ref 0–1.5)
LYMPHOCYTES # BLD AUTO: 16.4 % — SIGNIFICANT CHANGE UP (ref 13–44)
LYMPHOCYTES # BLD AUTO: 2.22 K/UL — SIGNIFICANT CHANGE UP (ref 1–3.3)
MCHC RBC-ENTMCNC: 27.5 PG — SIGNIFICANT CHANGE UP (ref 27–34)
MCHC RBC-ENTMCNC: 32.6 GM/DL — SIGNIFICANT CHANGE UP (ref 32–36)
MCV RBC AUTO: 84.2 FL — SIGNIFICANT CHANGE UP (ref 80–100)
MONOCYTES # BLD AUTO: 0.77 K/UL — SIGNIFICANT CHANGE UP (ref 0–0.9)
MONOCYTES NFR BLD AUTO: 5.7 % — SIGNIFICANT CHANGE UP (ref 2–14)
NEUTROPHILS # BLD AUTO: 10.41 K/UL — HIGH (ref 1.8–7.4)
NEUTROPHILS NFR BLD AUTO: 76.7 % — SIGNIFICANT CHANGE UP (ref 43–77)
PLATELET # BLD AUTO: 268 K/UL — SIGNIFICANT CHANGE UP (ref 150–400)
POTASSIUM SERPL-MCNC: 4.4 MMOL/L — SIGNIFICANT CHANGE UP (ref 3.5–5.3)
POTASSIUM SERPL-SCNC: 4.4 MMOL/L — SIGNIFICANT CHANGE UP (ref 3.5–5.3)
RBC # BLD: 4.48 M/UL — SIGNIFICANT CHANGE UP (ref 3.8–5.2)
RBC # FLD: 13.5 % — SIGNIFICANT CHANGE UP (ref 10.3–14.5)
SODIUM SERPL-SCNC: 138 MMOL/L — SIGNIFICANT CHANGE UP (ref 135–145)
SPECIMEN SOURCE: SIGNIFICANT CHANGE UP
WBC # BLD: 13.57 K/UL — HIGH (ref 3.8–10.5)
WBC # FLD AUTO: 13.57 K/UL — HIGH (ref 3.8–10.5)

## 2018-07-12 PROCEDURE — 82962 GLUCOSE BLOOD TEST: CPT

## 2018-07-12 PROCEDURE — 83690 ASSAY OF LIPASE: CPT

## 2018-07-12 PROCEDURE — 99285 EMERGENCY DEPT VISIT HI MDM: CPT | Mod: 25

## 2018-07-12 PROCEDURE — 87581 M.PNEUMON DNA AMP PROBE: CPT

## 2018-07-12 PROCEDURE — 94640 AIRWAY INHALATION TREATMENT: CPT

## 2018-07-12 PROCEDURE — 80048 BASIC METABOLIC PNL TOTAL CA: CPT

## 2018-07-12 PROCEDURE — 83605 ASSAY OF LACTIC ACID: CPT

## 2018-07-12 PROCEDURE — 99239 HOSP IP/OBS DSCHRG MGMT >30: CPT

## 2018-07-12 PROCEDURE — 71046 X-RAY EXAM CHEST 2 VIEWS: CPT

## 2018-07-12 PROCEDURE — 87880 STREP A ASSAY W/OPTIC: CPT

## 2018-07-12 PROCEDURE — 85027 COMPLETE CBC AUTOMATED: CPT

## 2018-07-12 PROCEDURE — 84484 ASSAY OF TROPONIN QUANT: CPT

## 2018-07-12 PROCEDURE — 93005 ELECTROCARDIOGRAM TRACING: CPT

## 2018-07-12 PROCEDURE — 99231 SBSQ HOSP IP/OBS SF/LOW 25: CPT

## 2018-07-12 PROCEDURE — 87040 BLOOD CULTURE FOR BACTERIA: CPT

## 2018-07-12 PROCEDURE — 83036 HEMOGLOBIN GLYCOSYLATED A1C: CPT

## 2018-07-12 PROCEDURE — 96374 THER/PROPH/DIAG INJ IV PUSH: CPT

## 2018-07-12 PROCEDURE — 84100 ASSAY OF PHOSPHORUS: CPT

## 2018-07-12 PROCEDURE — 87633 RESP VIRUS 12-25 TARGETS: CPT

## 2018-07-12 PROCEDURE — 70486 CT MAXILLOFACIAL W/O DYE: CPT

## 2018-07-12 PROCEDURE — 94660 CPAP INITIATION&MGMT: CPT

## 2018-07-12 PROCEDURE — 99221 1ST HOSP IP/OBS SF/LOW 40: CPT

## 2018-07-12 PROCEDURE — 94760 N-INVAS EAR/PLS OXIMETRY 1: CPT

## 2018-07-12 PROCEDURE — 87081 CULTURE SCREEN ONLY: CPT

## 2018-07-12 PROCEDURE — 82803 BLOOD GASES ANY COMBINATION: CPT

## 2018-07-12 PROCEDURE — 83735 ASSAY OF MAGNESIUM: CPT

## 2018-07-12 PROCEDURE — 87486 CHLMYD PNEUM DNA AMP PROBE: CPT

## 2018-07-12 PROCEDURE — 87798 DETECT AGENT NOS DNA AMP: CPT

## 2018-07-12 PROCEDURE — 87070 CULTURE OTHR SPECIMN AEROBIC: CPT

## 2018-07-12 PROCEDURE — 84145 PROCALCITONIN (PCT): CPT

## 2018-07-12 PROCEDURE — 80053 COMPREHEN METABOLIC PANEL: CPT

## 2018-07-12 PROCEDURE — 96375 TX/PRO/DX INJ NEW DRUG ADDON: CPT

## 2018-07-12 RX ORDER — PANTOPRAZOLE SODIUM 20 MG/1
1 TABLET, DELAYED RELEASE ORAL
Qty: 0 | Refills: 0 | COMMUNITY

## 2018-07-12 RX ORDER — LACTOBACILLUS ACIDOPHILUS 100MM CELL
1 CAPSULE ORAL
Qty: 10 | Refills: 0 | OUTPATIENT
Start: 2018-07-12 | End: 2018-07-21

## 2018-07-12 RX ORDER — PANTOPRAZOLE SODIUM 20 MG/1
1 TABLET, DELAYED RELEASE ORAL
Qty: 0 | Refills: 0 | COMMUNITY
Start: 2018-07-12

## 2018-07-12 RX ADMIN — INSULIN GLARGINE 50 UNIT(S): 100 INJECTION, SOLUTION SUBCUTANEOUS at 08:35

## 2018-07-12 RX ADMIN — Medication 4 MILLIGRAM(S): at 11:50

## 2018-07-12 RX ADMIN — Medication 100 MILLIGRAM(S): at 01:30

## 2018-07-12 RX ADMIN — ENOXAPARIN SODIUM 40 MILLIGRAM(S): 100 INJECTION SUBCUTANEOUS at 10:27

## 2018-07-12 RX ADMIN — GENTAMICIN SULFATE 1 DROP(S): 3 SOLUTION/ DROPS OPHTHALMIC at 05:36

## 2018-07-12 RX ADMIN — ENOXAPARIN SODIUM 40 MILLIGRAM(S): 100 INJECTION SUBCUTANEOUS at 00:07

## 2018-07-12 RX ADMIN — CARVEDILOL PHOSPHATE 25 MILLIGRAM(S): 80 CAPSULE, EXTENDED RELEASE ORAL at 05:36

## 2018-07-12 RX ADMIN — OXYCODONE AND ACETAMINOPHEN 1 TABLET(S): 5; 325 TABLET ORAL at 00:01

## 2018-07-12 RX ADMIN — Medication 1 TABLET(S): at 05:36

## 2018-07-12 RX ADMIN — OXYCODONE AND ACETAMINOPHEN 1 TABLET(S): 5; 325 TABLET ORAL at 12:35

## 2018-07-12 RX ADMIN — FLUCONAZOLE 100 MILLIGRAM(S): 150 TABLET ORAL at 11:50

## 2018-07-12 RX ADMIN — ALBUTEROL 2.5 MILLIGRAM(S): 90 AEROSOL, METERED ORAL at 05:25

## 2018-07-12 RX ADMIN — Medication 12: at 12:25

## 2018-07-12 RX ADMIN — GENTAMICIN SULFATE 1 DROP(S): 3 SOLUTION/ DROPS OPHTHALMIC at 01:30

## 2018-07-12 RX ADMIN — GENTAMICIN SULFATE 1 DROP(S): 3 SOLUTION/ DROPS OPHTHALMIC at 10:27

## 2018-07-12 RX ADMIN — Medication 25 UNIT(S): at 12:25

## 2018-07-12 RX ADMIN — TIOTROPIUM BROMIDE 1 CAPSULE(S): 18 CAPSULE ORAL; RESPIRATORY (INHALATION) at 05:42

## 2018-07-12 RX ADMIN — Medication 2 GRAM(S): at 11:49

## 2018-07-12 RX ADMIN — ALBUTEROL 2.5 MILLIGRAM(S): 90 AEROSOL, METERED ORAL at 01:54

## 2018-07-12 RX ADMIN — OXYCODONE AND ACETAMINOPHEN 1 TABLET(S): 5; 325 TABLET ORAL at 05:22

## 2018-07-12 RX ADMIN — ALBUTEROL 2.5 MILLIGRAM(S): 90 AEROSOL, METERED ORAL at 07:29

## 2018-07-12 RX ADMIN — MONTELUKAST 10 MILLIGRAM(S): 4 TABLET, CHEWABLE ORAL at 11:57

## 2018-07-12 RX ADMIN — PANTOPRAZOLE SODIUM 40 MILLIGRAM(S): 20 TABLET, DELAYED RELEASE ORAL at 05:38

## 2018-07-12 RX ADMIN — OXYCODONE AND ACETAMINOPHEN 1 TABLET(S): 5; 325 TABLET ORAL at 00:31

## 2018-07-12 RX ADMIN — OXYCODONE AND ACETAMINOPHEN 1 TABLET(S): 5; 325 TABLET ORAL at 06:51

## 2018-07-12 RX ADMIN — Medication 81 MILLIGRAM(S): at 11:50

## 2018-07-12 RX ADMIN — Medication 25 UNIT(S): at 08:17

## 2018-07-12 RX ADMIN — BUDESONIDE AND FORMOTEROL FUMARATE DIHYDRATE 2 PUFF(S): 160; 4.5 AEROSOL RESPIRATORY (INHALATION) at 05:38

## 2018-07-12 RX ADMIN — Medication 1 TABLET(S): at 11:50

## 2018-07-12 RX ADMIN — Medication 40 MILLIGRAM(S): at 05:35

## 2018-07-12 RX ADMIN — Medication 12: at 08:18

## 2018-07-12 RX ADMIN — GENTAMICIN SULFATE 1 DROP(S): 3 SOLUTION/ DROPS OPHTHALMIC at 13:24

## 2018-07-12 RX ADMIN — ALBUTEROL 2.5 MILLIGRAM(S): 90 AEROSOL, METERED ORAL at 12:00

## 2018-07-12 RX ADMIN — Medication 2 SPRAY(S): at 11:49

## 2018-07-12 RX ADMIN — LISINOPRIL 20 MILLIGRAM(S): 2.5 TABLET ORAL at 05:36

## 2018-07-12 RX ADMIN — LORATADINE 10 MILLIGRAM(S): 10 TABLET ORAL at 11:50

## 2018-07-12 NOTE — DISCHARGE NOTE ADULT - SECONDARY DIAGNOSIS.
Sinusitis Right heart failure GERD (gastroesophageal reflux disease) Diabetes mellitus type 2, uncontrolled

## 2018-07-12 NOTE — PROGRESS NOTE ADULT - SUBJECTIVE AND OBJECTIVE BOX
CAPILLARY BLOOD GLUCOSE      POCT Blood Glucose.: 304 mg/dL (11 Jul 2018 22:06)  POCT Blood Glucose.: 346 mg/dL (11 Jul 2018 17:05)  POCT Blood Glucose.: 419 mg/dL (11 Jul 2018 12:00)  POCT Blood Glucose.: 427 mg/dL (11 Jul 2018 11:54)  POCT Blood Glucose.: 287 mg/dL (11 Jul 2018 07:35)      Vital Signs Last 24 Hrs  T(C): 36.9 (12 Jul 2018 05:30), Max: 36.9 (11 Jul 2018 21:48)  T(F): 98.4 (12 Jul 2018 05:30), Max: 98.5 (11 Jul 2018 21:48)  HR: 80 (12 Jul 2018 05:30) (79 - 92)  BP: 136/70 (12 Jul 2018 05:30) (113/70 - 136/70)  BP(mean): --  RR: 17 (12 Jul 2018 05:30) (17 - 17)  SpO2: 90% (12 Jul 2018 05:30) (90% - 96%)    General: WN/WD NAD  Respiratory: diminished breath sounds  CV: RRR, S1S2, no murmurs, rubs or gallops  Abdominal: Soft, NT, ND +BS, Last BM  Extremities: No edema, + peripheral pulses    Hemoglobin A1C, Whole Blood: 8.2 % (07-09 @ 07:35)   07-11    138  |  104  |  18  ----------------------------<  264<H>  4.4   |  27  |  0.75    Ca    8.4<L>      11 Jul 2018 08:53  Phos  2.4     07-11  Mg     2.3     07-11    TPro  7.5  /  Alb  3.2<L>  /  TBili  0.3  /  DBili  x   /  AST  23  /  ALT  50  /  AlkPhos  121<H>  07-11      dextrose 40% Gel 15 Gram(s) Oral once PRN  dextrose 50% Injectable 12.5 Gram(s) IV Push once  dextrose 50% Injectable 25 Gram(s) IV Push once  dextrose 50% Injectable 25 Gram(s) IV Push once  glucagon  Injectable 1 milliGRAM(s) IntraMuscular once PRN  insulin glargine Injectable (LANTUS) 50 Unit(s) SubCutaneous at bedtime  insulin glargine Injectable (LANTUS) 50 Unit(s) SubCutaneous every morning  insulin lispro (HumaLOG) corrective regimen sliding scale   SubCutaneous at bedtime  insulin lispro (HumaLOG) corrective regimen sliding scale   SubCutaneous three times a day before meals  insulin lispro Injectable (HumaLOG) 25 Unit(s) SubCutaneous three times a day before meals  predniSONE   Tablet 40 milliGRAM(s) Oral daily  simvastatin 20 milliGRAM(s) Oral at bedtime

## 2018-07-12 NOTE — DISCHARGE NOTE ADULT - MEDICATION SUMMARY - MEDICATIONS TO TAKE
I will START or STAY ON the medications listed below when I get home from the hospital:    predniSONE 10 mg oral tablet  -- 4 tab(s) by mouth once a day decrease by 1 tab every three days until finish  -- It is very important that you take or use this exactly as directed.  Do not skip doses or discontinue unless directed by your doctor.  Obtain medical advice before taking any non-prescription drugs as some may affect the action of this medication.  Take with food or milk.    -- Indication: For Asthma with acute exacerbation    aspirin 81 mg oral tablet  -- 1 tab(s) by mouth once a day  -- Indication: For Diabetes mellitus type 2, uncontrolled    quinapril 20 mg oral tablet  -- 1 tab(s) by mouth once a day  -- Indication: For Hypertension    Victoza  -- 1.8 milligram(s) subcutaneous once a day  -- Indication: For Diabetes mellitus    Lantus 100 units/mL subcutaneous solution  -- 128 unit(s) subcutaneous once a day  -- Indication: For Diabetes mellitus type 2, uncontrolled    HumaLOG 100 units/mL subcutaneous solution  -- Indication: For Diabetes mellitus    Benadryl 25 mg oral capsule  -- 4 cap(s) by mouth once a day (at bedtime)  -- Indication: For insomnia    ZyrTEC 10 mg oral tablet  -- 1 tab(s) by mouth once a day  -- Indication: For Asthma with acute exacerbation    simvastatin 20 mg oral tablet  -- 1 tab(s) by mouth once a day (at bedtime)  -- Indication: For HLD (hyperlipidemia)    carvedilol 25 mg oral tablet  -- 1 tab(s) by mouth 2 times a day  -- Indication: For Hypertension    Stiolto Respimat 2.5 mcg-2.5 mcg inhalation aerosol  -- 2 puff(s) inhaled every 24 hours  -- Indication: For Asthma with acute exacerbation    albuterol 2.5 mg/3 mL (0.083%) inhalation solution  -- 3 milliliter(s) inhaled every 6 hours, As Needed  -- Indication: For Asthma with acute exacerbation    DuoNeb 0.5 mg-2.5 mg/3 mL inhalation solution  -- 3 milliliter(s) inhaled 4 times a day, As Needed  -- Indication: For Asthma with acute exacerbation    Lasix 40 mg oral tablet  -- 1 tab(s) by mouth 3 to 4 times a week, As Needed  -- Indication: For Right heart failure    Zaroxolyn  -- 5 milligram(s) by mouth 3 to 4 times a week, As Needed  -- Indication: For Right heart failure    Singulair 10 mg oral tablet  -- 1 tab(s) by mouth once a day  -- Indication: For Asthma with acute exacerbation    Nasonex 50 mcg/inh nasal spray  -- 2 spray(s) into nose once a day  -- Indication: For Asthma with acute exacerbation    Omega-3 oral capsule  -- 1200 milligram(s) by mouth once a day  -- Indication: For Supplement    amoxicillin-clavulanate 875 mg-125 mg oral tablet  -- 1 tab(s) by mouth 2 times a day  -- Indication: For Sinusitis    lactobacillus acidophilus oral capsule  -- 1 tab(s) by mouth once a day   -- Indication: For Sinusitis    pantoprazole 40 mg oral delayed release tablet  -- 1 tab(s) by mouth once a day (before a meal)  -- Indication: For GERD (gastroesophageal reflux disease)    Asmanex Twisthaler 60 Dose 220 mcg/inh inhalation aerosol powder  -- 1 puff(s) inhaled once a day (in the evening)  -- Indication: For Severe asthma with acute exacerbation, unspecified whether persistent    biotin  -- 5000 microgram(s) by mouth once a day  -- Indication: For Supplement    folic acid  -- 4 milligram(s) by mouth once a day  -- Indication: For Supplement    Vitamin D3 50,000 intl units oral capsule  -- 1 cap(s) by mouth once a week  -- Indication: For Supplement

## 2018-07-12 NOTE — PROGRESS NOTE ADULT - PROBLEM SELECTOR PLAN 1
change high dose humalog scale coverage  cont humalog 20 units tid before meals  cont lantus 50 units 2x/day  cont cons cho diet  goal bg 140-180 in icu setting
cont lantus 50 units 2x/day  cont humalog 25 units tid before meals  cont high dose humalog scale coverage  anticipate reduction in bg numbers with tapering of glucocorticoids  cont cons cho diet; goal bg 100-180 in hosp setting
increase humalog 25 units tid before meals  cont lantus 50 units 2x/day  cont high dose humalog scale coverage  goal bg 100-180 in hosp setting
Severe Acute asthma exacerbation likely 2/2 viral syndrome   Continue with Duonebs q6h, Proventil q4h( patient requesting duonebs Q1H)  IV solu-medrol 40mg q8h, and ICU plan of care  Continue with singulair and spiriva  hycodan prn cough  pain control  transitioned iv steroids to po  apprec pulm Dr. Lassiter consult recs  once resp improve dc planning, pt is high risk for intubateion, if decompensates overnight get an abg
Severe Acute asthma exacerbation likely 2/2 viral syndrome   requirinign ICU monitor due to wob   Continue with Duonebs q6h, Proventil q4h( patient requesting duonebs Q1H)  IV solu-medrol 40mg q8h, and ICU plan of care  Continue with singulair and spiriva  hycodan prn cough  pain control  apprec pulm Dr. Lassiter consult recs
Severe Acute asthma exacerbation likely 2/2 viral syndrome   requirinign ICU monitor due to wob, improving likely transfer to floor  Continue with Duonebs q6h, Proventil q4h( patient requesting duonebs Q1H)  IV solu-medrol 40mg q8h, and ICU plan of care  Continue with singulair and spiriva  hycodan prn cough  pain control  apprec pulm Dr. Lassiter consult recs

## 2018-07-12 NOTE — DISCHARGE NOTE ADULT - PLAN OF CARE
resolution -cont nebs as needed  -taper and complete all steroids over 12 days as directed  -follow up with your pulmonologist Dr. Lassiter within 1 week of discharge -complete antibitoics as per ID recommendations of augment for total of 10 days  -follow up with your primary medical provider and/or ENT within 1 week of discharge stable -cont home meds  -follow up with your cardiologist -cont home meds  -follow up with your primary medical provider -cont home meds  -follow up with your endocrinologist

## 2018-07-12 NOTE — DISCHARGE NOTE ADULT - CARE PROVIDER_API CALL
Mike Brownlee), Internal Medicine  [address] [place] [address]  Phone: [phone]  Fax: [phone]    Perlman, Craig D (DO), Medicine  [address] [place] [address]  Phone: [phone]  Fax: [phone]    Sarai Ochoa), Pulmonary Disease; Sleep Medicine  [address] [place] [address]  Phone: [phone]  Fax: [phone]

## 2018-07-12 NOTE — PROGRESS NOTE ADULT - ASSESSMENT
48 year old female with PMH of asthma s/p multiple intubations (last intubation 12/2016), right heart failure, DM2, Essential HTN, HTN, MTHFR deficiency and homocysteinuria, Prinzmetal angina, migraines, osteoporosis, and gastric ulcers presents with worsening shortness of breath. Patient states she started experiencing URI symptoms about 1 week ago. She reports intermittent fevers T max of 101.8, chills, fatigue, nausea, sinus pressure, lightheadedness, b/l ear pain, throat pain, bilateral chest wall discomfort, SOB, productive cough with yellow sputum, rhinorrhea with yellow sputum, and decreased appetite.  She followed up with her pulmonologist this past Thursday and was diagnosed with bilateral otitis media, sinusitis, and bronchitis.  She was started on Prednisone 60 mg and Augmentin BID.  Despite taking this along with a cough suppressant, decongestant, nsaids, and duonebs/xopenex, she did not have any improvement in symptoms.      - She has had no further chest discomfort and is no evidence for an acute cardiac process. She has evidence of some continued bronchospasm is stable from a cardiovascular standpoint.  Had left and right heart cath in 2013 which revealed normal coronaries  - she is very well known to this group as an outpatient  - she does carry a diagnosis of Prinzmetal's angina, and has been treated with ntg.  Per patient, she was on Cardizem  mg in the past but developed LE edema and has been adamant about it due to her "RV failure" according to her.  Both RHC and TTE, however, showed normal RVF.  If remains as an inpatient, may repeat TTE as last TTE on record both inpatient and outpatient was done in 2015.  Otherwise, may be done as outpatient.  - If biventricular function remain normal, consider starting low dose of CCB to treat her Printzmetal angina, which patient is aggreeable  - although objective evidence of ischemia is lacking, there seems to be little downside to symptomatic treatment with ntg sl as needed  - Continue coreg 25 bid   - Continue ASA and statin  - DVT prophylaxis  - Continue bronchodilators for management of her asthma.  Follow pulm recs  - will follow    Kathy Warren NP  Cardiology

## 2018-07-12 NOTE — PROGRESS NOTE ADULT - SUBJECTIVE AND OBJECTIVE BOX
Patient is a 48y old  Female who presents with a chief complaint of Worsening shortness of breath (08 Jul 2018 20:28)      INTERVAL HPI/OVERNIGHT EVENTS:    Feels better. Wants to be discharged    MEDICATIONS  (STANDING):  ALBUTerol    0.083% 2.5 milliGRAM(s) Nebulizer every 3 hours  amoxicillin  875 milliGRAM(s)/clavulanate 1 Tablet(s) Oral two times a day  aspirin enteric coated 81 milliGRAM(s) Oral daily  buDESOnide 160 MICROgram(s)/formoterol 4.5 MICROgram(s) Inhaler 2 Puff(s) Inhalation two times a day  carvedilol 25 milliGRAM(s) Oral every 12 hours  dextrose 5%. 1000 milliLiter(s) (50 mL/Hr) IV Continuous <Continuous>  dextrose 50% Injectable 12.5 Gram(s) IV Push once  dextrose 50% Injectable 25 Gram(s) IV Push once  dextrose 50% Injectable 25 Gram(s) IV Push once  enoxaparin Injectable 40 milliGRAM(s) SubCutaneous every 12 hours  fluconAZOLE   Tablet 100 milliGRAM(s) Oral daily  fluticasone propionate 50 MICROgram(s)/spray Nasal Spray 2 Spray(s) Both Nostrils daily  folic acid 4 milliGRAM(s) Oral daily  gentamicin 0.3% Ophthalmic Solution 1 Drop(s) Both EYES every 4 hours  insulin glargine Injectable (LANTUS) 50 Unit(s) SubCutaneous at bedtime  insulin glargine Injectable (LANTUS) 50 Unit(s) SubCutaneous every morning  insulin lispro (HumaLOG) corrective regimen sliding scale   SubCutaneous at bedtime  insulin lispro (HumaLOG) corrective regimen sliding scale   SubCutaneous three times a day before meals  insulin lispro Injectable (HumaLOG) 25 Unit(s) SubCutaneous three times a day before meals  lactobacillus acidophilus 1 Tablet(s) Oral daily  lisinopril 20 milliGRAM(s) Oral daily  loratadine 10 milliGRAM(s) Oral daily  montelukast 10 milliGRAM(s) Oral daily  omega-3-Acid Ethyl Esters 2 Gram(s) Oral daily  pantoprazole    Tablet 40 milliGRAM(s) Oral before breakfast  predniSONE   Tablet 40 milliGRAM(s) Oral daily  simvastatin 20 milliGRAM(s) Oral at bedtime  tiotropium 18 MICROgram(s) Capsule 1 Capsule(s) Inhalation daily      MEDICATIONS  (PRN):  artificial  tears Solution 1 Drop(s) Both EYES every 4 hours PRN Dry Eyes  dextrose 40% Gel 15 Gram(s) Oral once PRN Blood Glucose LESS THAN 70 milliGRAM(s)/deciliter  diphenhydrAMINE   Capsule 100 milliGRAM(s) Oral at bedtime PRN Insomnia  diphenhydrAMINE   Capsule 25 milliGRAM(s) Oral every 8 hours PRN Rash and/or Itching  diphenhydrAMINE   Capsule 50 milliGRAM(s) Oral once PRN Insomnia  glucagon  Injectable 1 milliGRAM(s) IntraMuscular once PRN Glucose LESS THAN 70 milligrams/deciliter  HYDROcodone/homatropine Syrup 5 milliLiter(s) Oral every 6 hours PRN Cough  ondansetron Injectable 4 milliGRAM(s) IV Push every 6 hours PRN Nausea and/or Vomiting  oxyCODONE    5 mG/acetaminophen 325 mG 1 Tablet(s) Oral every 4 hours PRN Moderate Pain (4 - 6)      Allergies    Depakote (Unknown)  Diprivan (Nausea)  gadolinium containing compounds (Angioedema)  latex (Unknown)  levofloxacin (Anaphylaxis (Mod to Severe))  nutrasweet (Unknown)  Ofirmev (Anaphylaxis)  propofol (Unknown)    Intolerances        PAST MEDICAL & SURGICAL HISTORY:  Osteoporosis  Gastric ulcer  HLD (hyperlipidemia)  Essential hypertension, hypertension with unspecified goal  Diabetes mellitus type 2 in obese  Migraine  MTHFR (methylene THF reductase) deficiency and homocystinuria  Polycystic disease, ovaries  CVA (cerebral vascular accident)  GERD (gastroesophageal reflux disease)  Obesity  Prinzmetal Angina  Asthma: intubated in the past  H/O ovarian cystectomy  Salpingo-oophoritis: oophrectomy  History of tonsillectomy  History of Cholecystectomy  Status Post Nissen Fundoplication (with Gastrostomy Tube Placement)      Vital Signs Last 24 Hrs  T(C): 36.9 (12 Jul 2018 05:30), Max: 36.9 (11 Jul 2018 21:48)  T(F): 98.4 (12 Jul 2018 05:30), Max: 98.5 (11 Jul 2018 21:48)  HR: 80 (12 Jul 2018 05:30) (79 - 92)  BP: 136/70 (12 Jul 2018 05:30) (113/70 - 136/70)  BP(mean): --  RR: 17 (12 Jul 2018 05:30) (17 - 17)  SpO2: 90% (12 Jul 2018 05:30) (90% - 96%)    PHYSICAL EXAMINATION:    GENERAL: The patient is awake and alert in no apparent distress.     HEENT: Head is normocephalic and atraumatic. Extraocular muscles are intact. Mucous membranes are moist.    NECK: Supple.    LUNGS: mild expiratory wheezes    HEART: Regular rate and rhythm without murmur.    ABDOMEN: obese with positive bowel sounds    EXTREMITIES: 2+ edema bilateral    NEUROLOGIC: Grossly intact.    SKIN: No ulceration or induration present.      LABS:                        12.3   13.57 )-----------( 268      ( 12 Jul 2018 08:17 )             37.7     07-12    138  |  103  |  21  ----------------------------<  285<H>  4.4   |  27  |  0.74    Ca    8.1<L>      12 Jul 2018 08:17  Phos  2.4     07-11  Mg     2.3     07-11    TPro  7.5  /  Alb  3.2<L>  /  TBili  0.3  /  DBili  x   /  AST  23  /  ALT  50  /  AlkPhos  121<H>  07-11                        MICROBIOLOGY:  Culture Results:   Normal Respiratory [place] present (07-10 @ 17:09)  Culture Results:   No Strep pyogenes (Group A) isolated (07-08 @ 21:45)  Culture Results:   No growth to date. (07-08 @ 17:26)  Culture Results:   No growth to date. (07-08 @ 17:26)      RADIOLOGY & ADDITIONAL STUDIES:    Assessment:    Resolving Sinusitis  Asthma with exacerbation  Morbid Obesity    Plan:    For discharge today  Steroid taper  Office visit 1 week

## 2018-07-12 NOTE — DISCHARGE NOTE ADULT - PATIENT PORTAL LINK FT
You can access the [facility] Portal, offered by [facility], by registering with the following website: [email or web address]

## 2018-07-12 NOTE — CONSULT NOTE ADULT - ASSESSMENT
48f with asthma obesity dm htn admitted with resp failure secondary to asthma exacerbation  secondary to URI/sinusitis

## 2018-07-12 NOTE — DISCHARGE NOTE ADULT - CARE PROVIDERS DIRECT ADDRESSES
[email or web address],DirectAddress_Unknown,DirectAddress_Unknown

## 2018-07-12 NOTE — PROGRESS NOTE ADULT - PROBLEM SELECTOR PROBLEM 1
Diabetes mellitus type 2, uncontrolled
Severe asthma with acute exacerbation, unspecified whether persistent

## 2018-07-12 NOTE — CONSULT NOTE ADULT - SUBJECTIVE AND OBJECTIVE BOX
[facility], Division of Infectious Diseases  RUPA Jamil A. Lee  [phone]  ADELA LEDESMA  48y, Female  [id number]    HPI:  48 year old female with PMH of asthma s/p multiple intubations (last intubation 12/2016), right heart failure, DM2, Essential HTN, HTN, MTHFR deficiency and homocysteinuria, Prinzmetal angina, migraines, osteoporosis, and gastric ulcers presents with worsening shortness of breath since yesterday.  Pt started with cough, dyspnea, ear ache and then nasal congestion right side.  She followed up with her pulmonologist this and was diagnosed with bilateral otitis media, sinusitis, and bronchitis.  She was started on Prednisone 60 mg and Augmentin BID.  Despite taking this along with a cough suppressant, decongestant, nsaids, and duonebs/xopenex.  Pt reports no improvement in symptoms.    Admitted to the ICU for bipap.  Transferred to floor and currently feels well.  Less dsypnea and less congestion.      PMH/PSH--  Osteoporosis  Gastric ulcer  HLD (hyperlipidemia)  Essential hypertension, hypertension with unspecified goal  Diabetes mellitus type 2 in obese  Migraine  MTHFR (methylene THF reductase) deficiency and homocystinuria  Polycystic disease, ovaries  CVA (cerebral vascular accident)  GERD (gastroesophageal reflux disease)  Obesity  Prinzmetal Angina  Asthma  H/O ovarian cystectomy  Salpingo-oophoritis  History of tonsillectomy  History of Cholecystectomy  Status Post Nissen Fundoplication (with Gastrostomy Tube Placement)      Allergies--levofloxacin, doxycycline  ? cephalosporin      Medications--  Antibiotics: amoxicillin  875 milliGRAM(s)/clavulanate 1 Tablet(s) Oral two times a day  fluconAZOLE   Tablet 100 milliGRAM(s) Oral daily    Immunologic:   Other: ALBUTerol    0.083%  artificial  tears Solution PRN  aspirin enteric coated  buDESOnide 160 MICROgram(s)/formoterol 4.5 MICROgram(s) Inhaler  carvedilol  dextrose 40% Gel PRN  dextrose 5%.  dextrose 50% Injectable  dextrose 50% Injectable  dextrose 50% Injectable  diphenhydrAMINE   Capsule PRN  diphenhydrAMINE   Capsule PRN  diphenhydrAMINE   Capsule PRN  enoxaparin Injectable  fluticasone propionate 50 MICROgram(s)/spray Nasal Spray  folic acid  gentamicin 0.3% Ophthalmic Solution  glucagon  Injectable PRN  HYDROcodone/homatropine Syrup PRN  insulin glargine Injectable (LANTUS)  insulin glargine Injectable (LANTUS)  insulin lispro (HumaLOG) corrective regimen sliding scale  insulin lispro (HumaLOG) corrective regimen sliding scale  insulin lispro Injectable (HumaLOG)  lactobacillus acidophilus  lisinopril  loratadine  montelukast  omega-3-Acid Ethyl Esters  ondansetron Injectable PRN  oxyCODONE    5 mG/acetaminophen 325 mG PRN  pantoprazole    Tablet  predniSONE   Tablet  simvastatin  tiotropium 18 MICROgram(s) Capsule      Social History--  EtOH: denies ***  Tobacco: denies ***  Drug Use: denies ***    Family/Marital History--  single  Remainder not relevant to clinical concern.    Travel/Environmental/Occupational History:  works as physician assistant     Review of Systems:  A >=10-point review of systems was obtained.     Pertinent positives and negatives--  Constitutional: + fevers. No Chills. No Rigors.   Eyes: no blurry vision  ENMT: + nasal congestion  Cardiovascular: No chest pain. No palpitations.  Respiratory: No shortness of breath. +cough.  Gastrointestinal: No nausea or vomiting. No diarrhea or constipation.   Genitourinary: no dysuria  Musculoskeletal: no myalgia  Skin: no rash  Neurologic: no headacahe or dizziness  Psychiatric: no anxiety    Review of systems otherwise negative except as previously noted.    Physical Exam--  Vital Signs: T(F): 98.4 (07-12-18 @ 05:30), Max: 98.5 (07-11-18 @ 21:48)  HR: 80 (07-12-18 @ 05:30)  BP: 136/70 (07-12-18 @ 05:30)  RR: 17 (07-12-18 @ 05:30)  SpO2: 90% (07-12-18 @ 05:30)  Wt(kg): --  General: Nontoxic-appearing Female in no acute distress.  HEENT: AT/NC.  Anicteric. Conjunctiva pink and moist. Oropharynx clear. Dentition fair.  Neck: Not rigid. No sense of mass.  Nodes: None palpable.  Lungs: occ exp wheeze  Heart: Regular rate and rhythm. No Murmur. No rub. No gallop. No palpable thrill.  Abdomen: Bowel sounds present and normoactive. Soft. Nondistended. Nontender.   Back: No spinal tenderness. No costovertebral angle tenderness.   Extremities: No cyanosis or clubbing. + edema.   Skin: Warm. Dry. Good turgor. No rash. No vasculitic stigmata.  Psychiatric: Appropriate affect and mood for situation.         Laboratory & Imaging Data--  CBC                        12.3   13.57 )-----------( 268      ( 12 Jul 2018 08:17 )             37.7       Chemistries  07-12    138  |  103  |  21  ----------------------------<  285<H>  4.4   |  27  |  0.74    Ca    8.1<L>      12 Jul 2018 08:17  Phos  2.4     07-11  Mg     2.3     07-11    TPro  7.5  /  Alb  3.2<L>  /  TBili  0.3  /  DBili  x   /  AST  23  /  ALT  50  /  AlkPhos  121<H>  07-11      Culture Data    Culture - Sputum (collected 10 Jul 2018 17:09)  Source: .Sputum Sputum  Gram Stain (10 Jul 2018 22:10):    Moderate polymorphonuclear leukocytes per low power field    Moderate Squamous epithelial cells per low power field    Moderate Gram Positive Cocci in Pairs and Chains per oil power field    Few Gram Positive Rods per oil power field    Rare Gram Negative Rods per oil power field  Preliminary Report (11 Jul 2018 17:40):    Normal Respiratory Madison present    Culture - Group A Streptococcus (collected 08 Jul 2018 21:45)  Source: .Throat Throat  Final Report (10 Jul 2018 21:37):    No Strep pyogenes (Group A) isolated    Culture - Blood (collected 08 Jul 2018 17:26)  Source: .Blood Blood-Venous  Preliminary Report (09 Jul 2018 18:01):    No growth to date.    Culture - Blood (collected 08 Jul 2018 17:26)  Source: .Blood Blood-Venous  Preliminary Report (09 Jul 2018 18:01):    No growth to date.          < from: CT Sinuses No Cont (07.09.18 @ 13:57) >    EXAM:  CT SINUSES                            PROCEDURE DATE:  07/09/2018          INTERPRETATION:  CLINICAL STATEMENT: Headaches    TECHNIQUE: CT of the paranasal sinuses was performed without IV contrast.   Coronal reformat obtained.    COMPARISON: None.    FINDINGS:  Small retention cyst/polyps maxillary sinuses. Mild mucosal thickening   left maxillary sinus. The sphenoid ethmoid and frontal sinuses are well   aerated. There is no air-fluid level. The osteomeatal units are patent.    The cribriform plate and the lamina papyracea are intact.     The nasal septum is deviated to the right with spurring    The globes are intact. Mild prominence bilateral optic nerve sheath.    Partial opacification of the right mastoid air cells    IMPRESSION:  Mild chronic inflammatory mucosal disease of the paranasal sinuses as   described above        < end of copied text >

## 2018-07-12 NOTE — PROGRESS NOTE ADULT - ATTENDING COMMENTS
Seen/examined. Agree with above. She is to follow up in our office upon d/c.
48M PMH Chronic persistent asthma (s/p multiple intubations), allergic rhinitis, morbid obesity, HTN, HLD, DM2 (insulin-dependent), MTHFR deficiency and homocysteinuria, Prinzmetal angina, Migraines, OP, and PUD who presents with severe asthma exacerbation in the setting of recent acute sinusitis.    - Pain control with percocet prn for LLL pleuritic chest pain  - HD stable, continue aspirin, statin, lisinopril, carvedilol; SLNG prn for angina  - Continue solumedrol 40 mg IV q8h for asthma exacerbation. Continue albuterol nebs q2h, start symbicort and spiriva  - Continue montelukast, loratidine, flonase, diphenhydramine prn, hycodan prn  - Diabetic diet as tolerated, continue home PPI  - Stable kidney function and lytes  - Restart augmentin for sinusitis (needs 6 more days to complete total of 10 days), sinus CT with mild chronic inflammatory changes  - DVT ppx with Lovenox 40 mg sq bid  - Continue folic acid for MTHFR deficiency  - Continue lantus 50 bid + pre-meal lispro 20 tid ac + HISS  - No lines or eng  - Discussed with patient at bedside. 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 Currently refusing BiPAP, but was amenable to ABG today which revealed normal pH and pCO2  CC time spent: 35 min
48M PMH Chronic persistent asthma (s/p multiple intubations), allergic rhinitis, morbid obesity, HTN, HLD, DM2 (insulin-dependent), MTHFR deficiency and homocysteinuria, Prinzmetal angina, Migraines, OP, and PUD who presents with severe asthma exacerbation in the setting of recent acute sinusitis.    - Pain control with percocet prn for LLL pleuritic chest pain  - HD stable, continue aspirin, statin, lisinopril, carvedilol  - Decrease solumedrol to 40 mg IV q12h for asthma exacerbation. Albuterol nebs q3h, continue symbicort and spiriva  - Continue montelukast, loratidine, flonase, diphenhydramine prn, hycodan prn  - Diet as tolerated, refusing diabetic diet, continue home PPI  - Stable kidney function and lytes  - Continue Augmentin for acute sinusitis (total day 6/10), sinus CT with mild chronic inflammatory changes. Oral fluconazole for candida vaginitis, gentamicin eye gtt for right eye  - DVT ppx with Lovenox 40 mg sq bid  - Continue folic acid for MTHFR deficiency  - Continue lantus 50 bid + pre-meal lispro 20 tid ac + HISS, f/up endocrine  - No lines or eng  - Discussed with patient at bedside, full code

## 2018-07-12 NOTE — PROGRESS NOTE ADULT - SUBJECTIVE AND OBJECTIVE BOX
[facility] -- Sachin Abreu, Kelli Hernandes, Gino Brownlee Savella  Office # [phone]      Follow Up:      Subjective/Observations:   Awake and alert.  Denies SOB, CP, or palpitations at this time.  Comfortable on RA.    REVIEW OF SYSTEMS: All other review of systems is negative unless indicated above    PAST MEDICAL & SURGICAL HISTORY:  Osteoporosis  Gastric ulcer  HLD (hyperlipidemia)  Essential hypertension, hypertension with unspecified goal  Diabetes mellitus type 2 in obese  Migraine  MTHFR (methylene THF reductase) deficiency and homocystinuria  Polycystic disease, ovaries  CVA (cerebral vascular accident)  GERD (gastroesophageal reflux disease)  Obesity  Prinzmetal Angina  Asthma: intubated in the past  H/O ovarian cystectomy  Salpingo-oophoritis: oophrectomy  History of tonsillectomy  History of Cholecystectomy  Status Post Nissen Fundoplication (with Gastrostomy Tube Placement)    MEDICATIONS  (STANDING):  ALBUTerol    0.083% 2.5 milliGRAM(s) Nebulizer every 3 hours  amoxicillin  875 milliGRAM(s)/clavulanate 1 Tablet(s) Oral two times a day  aspirin enteric coated 81 milliGRAM(s) Oral daily  buDESOnide 160 MICROgram(s)/formoterol 4.5 MICROgram(s) Inhaler 2 Puff(s) Inhalation two times a day  carvedilol 25 milliGRAM(s) Oral every 12 hours  dextrose 5%. 1000 milliLiter(s) (50 mL/Hr) IV Continuous <Continuous>  dextrose 50% Injectable 12.5 Gram(s) IV Push once  dextrose 50% Injectable 25 Gram(s) IV Push once  dextrose 50% Injectable 25 Gram(s) IV Push once  enoxaparin Injectable 40 milliGRAM(s) SubCutaneous every 12 hours  fluconAZOLE   Tablet 100 milliGRAM(s) Oral daily  fluticasone propionate 50 MICROgram(s)/spray Nasal Spray 2 Spray(s) Both Nostrils daily  folic acid 4 milliGRAM(s) Oral daily  gentamicin 0.3% Ophthalmic Solution 1 Drop(s) Both EYES every 4 hours  insulin glargine Injectable (LANTUS) 50 Unit(s) SubCutaneous at bedtime  insulin glargine Injectable (LANTUS) 50 Unit(s) SubCutaneous every morning  insulin lispro (HumaLOG) corrective regimen sliding scale   SubCutaneous at bedtime  insulin lispro (HumaLOG) corrective regimen sliding scale   SubCutaneous three times a day before meals  insulin lispro Injectable (HumaLOG) 25 Unit(s) SubCutaneous three times a day before meals  lactobacillus acidophilus 1 Tablet(s) Oral daily  lisinopril 20 milliGRAM(s) Oral daily  loratadine 10 milliGRAM(s) Oral daily  montelukast 10 milliGRAM(s) Oral daily  omega-3-Acid Ethyl Esters 2 Gram(s) Oral daily  pantoprazole    Tablet 40 milliGRAM(s) Oral before breakfast  predniSONE   Tablet 40 milliGRAM(s) Oral daily  simvastatin 20 milliGRAM(s) Oral at bedtime  tiotropium 18 MICROgram(s) Capsule 1 Capsule(s) Inhalation daily    MEDICATIONS  (PRN):  artificial  tears Solution 1 Drop(s) Both EYES every 4 hours PRN Dry Eyes  dextrose 40% Gel 15 Gram(s) Oral once PRN Blood Glucose LESS THAN 70 milliGRAM(s)/deciliter  diphenhydrAMINE   Capsule 100 milliGRAM(s) Oral at bedtime PRN Insomnia  diphenhydrAMINE   Capsule 25 milliGRAM(s) Oral every 8 hours PRN Rash and/or Itching  diphenhydrAMINE   Capsule 50 milliGRAM(s) Oral once PRN Insomnia  glucagon  Injectable 1 milliGRAM(s) IntraMuscular once PRN Glucose LESS THAN 70 milligrams/deciliter  HYDROcodone/homatropine Syrup 5 milliLiter(s) Oral every 6 hours PRN Cough  ondansetron Injectable 4 milliGRAM(s) IV Push every 6 hours PRN Nausea and/or Vomiting  oxyCODONE    5 mG/acetaminophen 325 mG 1 Tablet(s) Oral every 4 hours PRN Moderate Pain (4 - 6)    Allergies    Depakote (Unknown)  Diprivan (Nausea)  gadolinium containing compounds (Angioedema)  latex (Unknown)  levofloxacin (Anaphylaxis (Mod to Severe))  nutrasweet (Unknown)  Ofirmev (Anaphylaxis)  propofol (Unknown)    Intolerances    Vital Signs Last 24 Hrs  T(C): 36.9 (2018 05:30), Max: 36.9 (2018 21:48)  T(F): 98.4 (2018 05:30), Max: 98.5 (2018 21:48)  HR: 80 (2018 05:30) (79 - 92)  BP: 136/70 (2018 05:30) (113/70 - 136/70)  BP(mean): --  RR: 17 (2018 05:30) (17 - 17)  SpO2: 90% (2018 05:30) (90% - 96%)    I&O's Summary      PHYSICAL EXAM:  TELE: NSR  Constitutional: NAD, awake and alert, well-developed  HEENT: Moist Mucous Membranes, Anicteric  Pulmonary: Non-labored, breath sounds are clear bilaterally, No wheezing, rales or rhonchi  Cardiovascular: Regular, S1 and S2, No murmurs, rubs, gallops or clicks  Gastrointestinal: Bowel Sounds present, soft, nontender.   Lymph: No peripheral edema. No lymphadenopathy.  Skin: No visible rashes or ulcers.  Psych:  Mood & affect appropriate    LABS: All Labs Reviewed:                        12.3   13.57 )-----------( 268      ( 2018 08:17 )             37.7                         13.1   13.52 )-----------( 290      ( 2018 08:53 )             39.8                         12.2   17.18 )-----------( 196      ( 10 Jul 2018 06:23 )             38.1     2018 08:17    138    |  103    |  21     ----------------------------<  285    4.4     |  27     |  0.74   2018 08:53    138    |  104    |  18     ----------------------------<  264    4.4     |  27     |  0.75   10 Jul 2018 06:23    140    |  109    |  20     ----------------------------<  349    4.9     |  22     |  0.78     Ca    8.1        2018 08:17  Ca    8.4        2018 08:53  Ca    8.0        10 Jul 2018 06:23  Phos  2.4       2018 08:53  Phos  2.5       10 Jul 2018 06:23  Mg     2.3       2018 08:53  Mg     2.2       10 Jul 2018 06:23    TPro  7.5    /  Alb  3.2    /  TBili  0.3    /  DBili  x      /  AST  23     /  ALT  50     /  AlkPhos  121    2018 08:53  TPro  7.0    /  Alb  2.8    /  TBili  0.2    /  DBili  x      /  AST  20     /  ALT  48     /  AlkPhos  120    10 Jul 2018 06:23    < from: TTE Echo Doppler w/o Cont (09.24.15 @ 11:10) >     EXAM:  ECHO TTE W/O CON COMP W/DOPPLR      PROCEDURE DATE:  2015      INTERPRETATION:  Ordering Physician: KUSH DALTON    Indication: Dyspnea    Technician: ALISSA    Study Quality: Poor   A complete echocardiographic study was performed utilizing standard   protocol including spectral and color Doppler in all echocardiographic   windows.    Height: 162 cm  Weight: 138 kg  BSA: 2.3m2  Blood Pressure: 120/70    MEASUREMENTS  IVS: 1.0cm  PWT: 1.0cm  LA: 4.4cm  AO: 3.3cm  LVIDd: 5.6cm  LVIDs: 0.5cm  LVOT:    cm    LVEF: 50%  RVSP: 21mm/Hg  RA Pressure: 10mm/Hg  IVC:    cm    FINDINGS  Left Ventricle: Grossly normal left ventricular systolic function is   visualized  Right Ventricle: Normal as visualized  Left Atrium: Mild dilatation  Right Atrium: Normal as visualized  Mitral Valve: Normal as visualized  Aortic Valve: Normal as visualized  Tricuspid Valve: Mild tricuspid regurgitation  Pulmonic Valve:      Diastolic Function:      Pericardium/Pleura: No significant pericardialor pleural effusion      CONCLUSIONS:  Technically limited study  1. Grossly normal left ventricular systolic function as visualized  2. No gross valvular disease is visualized  3. No significant pericardial or pleural effusion    FRANCA HERNANDES M.D., ATTENDING CARDIOLOGIST  This document has been electronically signed. Sep 24 2015  9:18P          < end of copied text >    < from: Xray Chest 2 Views PA/Lat (18 @ 15:48) >    < from: Cardiac Cath Lab (13 @ 12:11) >    [facility]  Department of Cardiology  [address] [place] [address]  [phone]  Cath Lab Report -- Comprehensive Report  Patient: ADELA LEDESMA  Study date: 2013  Account number: [id number]  MR number: [id number]  : 1970  Gender: Female  Race: W  Case Physician(s):  Alan Salazar M.D.  Fellow:  Rica Sifuentes M.D.  Referring Physician:  Mayank Pereira M.D.  INDICATIONS: SOB, CP transferred from outside hosptial to rule out CAD.  HISTORY:Obese, pulmonary disease, HTN, hyperlipidemia  PROCEDURE:  --  Right heart catheterization.  --  Left heart catheterization with ventriculography.  --  Left coronary angiography.  --  Right coronary angiography.  TECHNIQUE: The risks and alternativesof the procedures and conscious  sedation were explained to the patient and informed consent was obtained.  Cardiac catheterization performed electively. Cath lab room 6.  Local anesthetic given. Left femoral artery access. Left femoral vein  access.Right heart catheterization. The procedure was performed utilizing  a catheter. Left heart catheterization. Ventriculography was performed.  Left coronary artery angiography. The vessel was injected utilizing a  catheter. Right coronary artery angiography. The vessel was injected  utilizing a catheter. RADIATION EXPOSURE: 3.9 min.  CONTRAST GIVEN: Omnipaque 40 ml.  MEDICATIONS GIVEN: Midazolam, 2 mg, IV. Fentanyl, 50 mcg, IV. Fentanyl, 50  mcg, IV. Nitroglycerin, 200 mcg, IA. Nitroglycerin, 200 mcg, IA.  VENTRICLES: EF estimated was 60 %.  CORONARY VESSELS: The coronary circulation is right dominant.  LM:   --  LM: Normal.  LAD:   --  LAD: Normal. The vessel was large sized.  CX:   --  Circumflex: Normal. The vessel was large sized.  RCA:   --  RCA: Normal. The vessel was large sized.  COMPLICATIONS: There were no complications.  DIAGNOSTIC RECOMMENDATIONS: Normal coronary angiogram. Normal LV function.  Mild PH with normal PVRI which responds to NTG. Transfer to medical  service  INTERVENTIONAL RECOMMENDATIONS: Normal coronary angiogram. Normal LV  function. Mild PH with normal PVRI which responds to NTG. Transfer to  medical service  Prepared and signed by  Alan Salazar M.D.  Signed 2013 09:20:52  HEMODYNAMIC TABLES  Pressures:  Baseline/ Room Air  Pressures:  - HR: 91  Pressures:  - Rhythm:  Pressures:  -- Aortic Pressure (S/D/M): 132/85/108  Pressures:  -- Left Ventricle (s/edp): 131/36/--  Pressures:  -- Pulmonary Artery (S/D/M): 37/22/28  Pressures:  -- Pulmonary Capillary Wedge: 24/23/20  Pressures:  -- Right Atrium (a/v/M): 21/18/16  Pressures:  -- Right Ventricle (s/edp): 40/21/--  O2 Sats:  Baseline/ Room Air  O2 Sats:  - HR: 91  O2 Sats:  - Rhythm:  O2 Sats:  -- AO: 10.4/91.9/13  O2 Sats:  -- PA: 10.4/68.1/9.63  Outputs:  Baseline/ Room Air  Outputs:  -- CALCULATIONS[age]  Outputs:  -- CALCULATIONS: Body Surface Area: 2.30  Outputs:  -- CALCULATIONS: Height in cm: 163.00  Outputs:  -- CALCULATIONS: Sex: Female  Outputs:  -- CALCULATIONS: Weight in k.40  Outputs:  -- OUTPUTS: CO by Kayode: 9.15  Outputs:  -- OUTPUTS: Kayode cardiac index: 3.98  Outputs:  -- OUTPUTS: O2 consumption: 308.00  Outputs:  -- OUTPUTS: Vo2 Indexed: 133.83  Outputs:  -- RESISTANCES: Left ventricular stroke work: 112.89  Outputs:  -- RESISTANCES: Left Ventricular Stroke Work index: 49.05  Outputs:  -- RESISTANCES: Pulmonary vascular index (dsc): 160.94  Outputs:  -- RESISTANCES: Pulmonary vascular index (Wood Units): 2.01  Outputs:  -- RESISTANCES: Pulmonaryvascular resistance (dsc): 69.93  Outputs:  -- RESISTANCES: Pulmonary vascular resistance (Wood Units): 0.87  Outputs:  -- RESISTANCES: PVR_SVR Ratio: 0.09  Outputs:  -- RESISTANCES: Right ventricular stroke work: 16.06  Outputs:  -- RESISTANCES: Right ventricular stroke work index: 6.98  Outputs:  -- RESISTANCES: Systemic vascular index (dsc): 1850.86  Outputs:  -- RESISTANCES: Systemic vascular index (Wood Units): 23.14  Outputs:  -- RESISTANCES: Systemic vascular resistance (dsc): 804.21  Outputs:  -- RESISTANCES: Systemic vascular resistance (Wood Units): 10.06  Outputs:  -- RESISTANCES: Total pulmonary index (dsc): 563.30  Outputs:  -- RESISTANCES: Total pulmonary index (Wood Units): 7.04  Outputs:  -- RESISTANCES: Total pulmonary resistance (dsc): 244.76  Outputs:  -- RESISTANCES: Total pulmonary resistance (Wood Units): 3.06  Outputs:  -- RESISTANCES: Total vascular index (Wood Units): 27.17  Outputs:  -- RESISTANCES: Total vascular resistance (dsc): 944.07  Outputs:  -- RESISTANCES: Total vascular resistance (Wood Units): 11.80  Outputs:  -- RESISTANCES: Total vascular resistance index (dsc): 2172.74  Outputs:  -- RESISTANCES: TPR_TVR Ratio: 0.26  Outputs:  -- SHUNTS: Pulmonary flow: 9.15  Outputs:  -- SHUNTS: Qp Indexed: 3.98  Outputs:  -- SHUNTS: Qs Indexed: 3.98  Outputs:  -- SHUNTS: Systemic flow: 9.15    < end of copied text >      EXAM:  XR CHEST PA LAT 2V                          PROCEDURE DATE:  2018        INTERPRETATION:  CLINICAL INFORMATION: Cough, shortness of breath    EXAM: 2 views of chest performed on 2018    COMPARISON: 12/15/2017.    FINDINGS:  Thelungs are clear. No pleural effusion or pneumothorax.  The cardiac silhouette is normal.  The osseous structures are unremarkable.    IMPRESSION:  Clear lungs    CIRILO VILLAFANA M.D., ATTENDING RADIOLOGIST  This document has been electronically signed. 2018  3:52PM        < end of copied text >

## 2018-07-12 NOTE — CONSULT NOTE ADULT - ATTENDING COMMENTS
discussed with pt   answered all questions  she should f/u with her pcp and pulmonologist.  no objection to discharge

## 2018-07-12 NOTE — DISCHARGE NOTE ADULT - CARE PLAN
Principal Discharge DX:	Severe asthma with acute exacerbation, unspecified whether persistent  Goal:	resolution  Assessment and plan of treatment:	-cont nebs as needed  -taper and complete all steroids over 12 days as directed  -follow up with your pulmonologist Dr. Lassiter within 1 week of discharge  Secondary Diagnosis:	Sinusitis  Goal:	resolution  Assessment and plan of treatment:	-complete antibitoics as per ID recommendations of augment for total of 10 days  -follow up with your primary medical provider and/or ENT within 1 week of discharge  Secondary Diagnosis:	Right heart failure  Goal:	stable  Assessment and plan of treatment:	-cont home meds  -follow up with your cardiologist  Secondary Diagnosis:	GERD (gastroesophageal reflux disease)  Goal:	stable  Assessment and plan of treatment:	-cont home meds  -follow up with your primary medical provider  Secondary Diagnosis:	Diabetes mellitus type 2, uncontrolled  Goal:	stable  Assessment and plan of treatment:	-cont home meds  -follow up with your endocrinologist

## 2018-07-12 NOTE — CONSULT NOTE ADULT - PROBLEM SELECTOR RECOMMENDATION 9
cont lantus 50 units 2x/day  cont humalog 20 units 3x/day  cont mod dose humalog scale coverage  victoza on hold; pt to bring from home  goal bg 140-180 in icu setting
would cont augmentin and completer 10-14 day course  encouraged nasal sprays

## 2018-07-12 NOTE — DISCHARGE NOTE ADULT - ADDITIONAL INSTRUCTIONS
-please follow up with a primary medical provider, your pulmonologist, cardiologist and your endocrinologist within 1 week of discharge  -pt responsible to make all follow up appts

## 2018-07-12 NOTE — DISCHARGE NOTE ADULT - HOSPITAL COURSE
48 year old female with PMH of asthma s/p multiple intubations (last intubation 12/2016), right heart failure, DM2, Essential HTN, HTN, MTHFR deficiency and homocysteinuria, Prinzmetal angina, migraines, osteoporosis, and gastric ulcers presents with worsening shortness of breath admitted with severe asthma exacerbation likely sec to URI and sinusitis. Pt was initially admitted to ICU and improved with supportive care she was transitioned to floor and improved. Pt was seen by cardiology, pulmonology and infectious disease. She was recommended to complete a 10-14 day course of augmentin. Pt also given slow taper of prednisone. Pt is stable and improving for discharge with close follow up.       Time spent: 55 minutes

## 2018-08-16 NOTE — ED PROVIDER NOTE - DATE/TIME 1
Patient Education     Understanding Rotator Cuff Injuries  The rotator cuff is a team of muscles and connecting tendons in the shoulder. It attaches your upper arm to your shoulder blade. Your rotator cuff helps you reach, throw, push, pull, and lift. Without it, your shoulder can't do its job properly.        Overuse tendonitis is irritation, bruising, or fraying of the rotator cuff.       A healthy rotator cuff  A healthy rotator cuff gives your shoulder flexibility and control. The rotator cuff holds your upper arm bone (humerus) in your shoulder socket (glenoid). It also helps move the shoulder.  A damaged rotator cuff  Pain and weakness told you that something was wrong with your shoulder. Now you know it’s a rotator cuff problem. Rotator cuff tendons can become damaged or inflamed. This is called tendonitis. Possible causes include:  · Irritation from overuse  · Bursal inflammation (bursitis)  · Pinching (impingement)  · Calcium deposits (calcification)  · Tears in the tendon.  Getting your shoulder healthy again  Care for your shoulder will most likely begin with nonsurgical treatments. You may start with simple rest. If needed, you may have injections that decrease inflammation and pain. Your healthcare provider will tell you how often you may need these treatments. If rest and injections relieve your pain, you will be given an exercise program. This will help restore your shoulder’s strength and function. If your pain continues, your healthcare provider may suggest surgery to repair the rotator cuff.  © 9727-7672 [employer]. [address] [place] [id number]. All rights reserved. This information is not intended as a substitute for professional medical care. Always follow your healthcare professional's instructions.            13-Dec-2017 02:23

## 2018-11-08 NOTE — CONSULT NOTE ADULT - PROBLEM SELECTOR PROBLEM 1
Diabetes mellitus type 2, uncontrolled
Sinusitis
no intercourse/no douching/no tub baths/weight bearing as tolerated/nothing per vagina/exercise/nothing per rectum/no tampons

## 2019-01-20 NOTE — DISCHARGE NOTE ADULT - MEDICATION SUMMARY - MEDICATIONS TO STOP TAKING
I will STOP taking the medications listed below when I get home from the hospital:  None
never had anesthesia

## 2021-05-12 NOTE — DISCHARGE NOTE ADULT - DISCHARGE DATE
Patient appears to be sleeping without difficulty throughout the night  Will continue to monitor safety and behaviors every 7 minutes  17-Dec-2017

## 2021-10-14 NOTE — PATIENT PROFILE ADULT. - NUTRITION, WHO PREPARES MEAL, PROFILE

## 2022-11-09 NOTE — ED PROVIDER NOTE - ENDOCRINE NEGATIVE STATEMENT, MLM
Orders Placed This Encounter    medroxyPROGESTERone (PROVERA) 10 MG tablet     Sig: Take one tablet by mouth daily for 10 days     Dispense:  10 tablet     Refill:  0    clomiPHENE (CLOMID) 50 MG tablet     Sig: Take 2 tablets by mouth cycle day 3-7     Dispense:  10 tablet     Refill:  0
Per Dr Sandra Valladares, Claudia Quinn OK to do provera again\"    Orders Placed This Encounter    medroxyPROGESTERone (PROVERA) 10 MG tablet     Sig: Take one tablet by mouth daily for 10 days     Dispense:  10 tablet     Refill:  0
no diabetes and no thyroid trouble.

## 2023-04-14 NOTE — CONSULT NOTE ADULT - SUBJECTIVE AND OBJECTIVE BOX
Last week pt told this RN she was taking miralax and pantoprazole daily. Called [facility] and spoke with Lyudmila. Lyudmila reports that she fills pt's med box and confirmed that pt is taking miralax and pantoprazole; usually in the afternoon. Dr. Mcdaniels was updated.  
CHIEF COMPLAINT: Patient is a 47y old  Female who presents with a chief complaint of Fever , neck pain (14 Dec 2017 13:53)      HPI:  Patient is a 47 year old female with PMH of asthma, w hx of 21 intubations,  right heart failure, diabetes, Essential HTN, MTHFR deficiency and homocysteinuria, Prinzmetal angina presents complaining of neck pain that has been constant for the past week. Patient went to [place], diagnosed with torticollis, and this ER yesterday for complaint but was sent home after imaging revealed no findings. Then she started to have SOB and fevers and chills. She states that recently she thought the she was in HF which resolved with taking her diuretic (which she is not consistent with).     In ED, patient was afebrile but tachycardic. Blood pressure was elevated at 155/98. Remainder of vitals were within normal range. CBC revealed mild leukocytosis, BMP was unremarkable. Lumbar puncture showed a CSF glucose level of 85 and protein of 29. RVP positive for Rhinovirus. CXR showed  ill-defined patchy haziness right lower lobe. Present early pneumonia, follow-up to resolution is suggested.  There are no pleural effusion. Tortuous thoracic aorta, heart normal size.    There are no acute osseous abnormality. CT of neck showed findings compatible with prominent calcific tendinitis of the longus colli muscle with prevertebral soft tissue fullness; no localized fluid collection/abscess demonstrated. Patient given albuterol and duoneb treatment, dilaudid for pain, dose of vancomycin and NS. (13 Dec 2017 21:22)    She is currently feels a bit better. She is stil complaining of a chest tightness that worsens with dep inspiration. Denies any  PND, orthopnea, near syncope, syncope,  stroke like symptoms. Her lower extremity edema is stable    EKG: none    REVIEW OF SYSTEMS:   All other review of systems are negative unless indicated above    PAST MEDICAL & SURGICAL HISTORY:  Essential hypertension, hypertension with unspecified goal  Diabetes mellitus type 2 in obese  Migraine  MTHFR (methylene THF reductase) deficiency and homocystinuria  Polycystic disease, ovaries  CVA (cerebral vascular accident)  GERD (gastroesophageal reflux disease)  Obesity  Prinzmetal Angina  Asthma: intubated in the past  H/O ovarian cystectomy  Salpingo-oophoritis: oophrectomy  History of tonsillectomy  History of Cholecystectomy  Status Post Nissen Fundoplication (with Gastrostomy Tube Placement)      SOCIAL HISTORY:  No tobacco, ethanol, or drug abuse.    FAMILY HISTORY:  No pertinent family history in first degree relatives    No family history of acute MI or sudden cardiac death.    MEDICATIONS  (STANDING):  ALBUTerol    90 MICROgram(s) HFA Inhaler 1 Puff(s) Inhalation every 4 hours  ALBUTerol/ipratropium for Nebulization 3 milliLiter(s) Nebulizer every 6 hours  aspirin enteric coated 81 milliGRAM(s) Oral daily  azithromycin  IVPB 500 milliGRAM(s) IV Intermittent every 24 hours  azithromycin  IVPB      calcium carbonate 500 mG (Tums) Chewable 1 Tablet(s) Chew daily  carvedilol 25 milliGRAM(s) Oral every 12 hours  cefTRIAXone   IVPB 1 Gram(s) IV Intermittent every 24 hours  dextrose 5%. 1000 milliLiter(s) (50 mL/Hr) IV Continuous <Continuous>  dextrose 50% Injectable 12.5 Gram(s) IV Push once  dextrose 50% Injectable 25 Gram(s) IV Push once  dextrose 50% Injectable 25 Gram(s) IV Push once  diltiazem    milliGRAM(s) Oral daily  enoxaparin Injectable 40 milliGRAM(s) SubCutaneous daily  folic acid 4 milliGRAM(s) Oral daily  insulin glargine Injectable (LANTUS) 30 Unit(s) SubCutaneous two times a day  insulin lispro (HumaLOG) corrective regimen sliding scale   SubCutaneous at bedtime  insulin lispro (HumaLOG) corrective regimen sliding scale   SubCutaneous three times a day before meals  insulin lispro Injectable (HumaLOG) 10 Unit(s) SubCutaneous three times a day before meals  lisinopril 20 milliGRAM(s) Oral daily  loratadine 10 milliGRAM(s) Oral daily  mometasone 220 MICROgram(s) Inhaler 1 Puff(s) Inhalation daily  montelukast 10 milliGRAM(s) Oral daily  oseltamivir 75 milliGRAM(s) Oral daily  pantoprazole    Tablet 40 milliGRAM(s) Oral before breakfast  predniSONE   Tablet 60 milliGRAM(s) Oral every 12 hours  simvastatin 20 milliGRAM(s) Oral at bedtime  tiotropium 18 MICROgram(s) Capsule 1 Capsule(s) Inhalation daily    MEDICATIONS  (PRN):  dextrose Gel 1 Dose(s) Oral once PRN Blood Glucose LESS THAN 70 milliGRAM(s)/deciliter  furosemide    Tablet 40 milliGRAM(s) Oral every other day PRN edema  glucagon  Injectable 1 milliGRAM(s) IntraMuscular once PRN Glucose LESS THAN 70 milligrams/deciliter  HYDROmorphone  Injectable 0.5 milliGRAM(s) IV Push every 3 hours PRN Severe Pain (7 - 10)  ketorolac   Injectable 30 milliGRAM(s) IV Push every 8 hours PRN Moderate Pain (4 - 6)  metolazone 5 milliGRAM(s) Oral every 24 hours PRN water retention      Allergies    Depakote (Unknown)  Diprivan (Nausea)  gadolinium containing compounds (Angioedema)  latex (Unknown)  levofloxacin (Anaphylaxis (Mod to Severe))  nutrasweet (Unknown)  Ofirmev (Anaphylaxis)  propofol (Unknown)    Intolerances        Home meds:  Home Medications:  Asmanex Twisthaler 60 Dose 220 mcg/inh inhalation aerosol powder: 1 puff(s) inhaled once a day (in the evening) (13 Dec 2017 22:08)  aspirin 81 mg oral tablet: 1 tab(s) orally once a day (13 Dec 2017 22:08)  biotin: 5000 microgram(s) orally once a day (13 Dec 2017 22:08)  calcium carbonate: 1200 milligram(s) orally once a day (13 Dec 2017 22:08)  Cardizem  mg/24 hours oral capsule, extended release: 1 cap(s) orally once a day (13 Dec 2017 22:08)  carvedilol 25 mg oral tablet: 1 tab(s) orally 2 times a day (13 Dec 2017 22:08)  DuoNeb 0.5 mg-2.5 mg/3 mL inhalation solution: 3 milliliter(s) inhaled 4 times a day (13 Dec 2017 22:08)  folic acid: 4 milligram(s) orally once a day (13 Dec 2017 22:08)  Lasix 40 mg oral tablet: 1 tab(s) orally 3 to 4 times a week, As Needed (13 Dec 2017 22:08)  Omega-3 oral capsule: 1200 milligram(s) orally once a day (13 Dec 2017 22:08)  Protonix 40 mg oral delayed release tablet: 1 tab(s) orally once a day (13 Dec 2017 22:08)  quinapril 20 mg oral tablet: 1 tab(s) orally once a day (13 Dec 2017 22:08)  simvastatin 20 mg oral tablet: 1 tab(s) orally once a day (at bedtime) (13 Dec 2017 22:08)  Singulair 10 mg oral tablet: 1 tab(s) orally once a day (13 Dec 2017 22:08)  Stiolto Respimat 2.5 mcg-2.5 mcg inhalation aerosol: 2 puff(s) inhaled every 24 hours (13 Dec 2017 22:08)  Victoza: 1.8 milligram(s) subcutaneous once a day (13 Dec 2017 22:08)  Vitamin D3 50,000 intl units oral capsule: 1 cap(s) orally once a week (13 Dec 2017 22:08)  Zaroxolyn: 5 milligram(s) orally 3 to 4 times a week, As Needed (13 Dec 2017 22:08)        VITAL SIGNS:   Vital Signs Last 24 Hrs  T(C): 36.9 (16 Dec 2017 14:09), Max: 36.9 (15 Dec 2017 21:39)  T(F): 98.4 (16 Dec 2017 14:09), Max: 98.4 (15 Dec 2017 21:39)  HR: 92 (16 Dec 2017 14:09) (76 - 94)  BP: 142/74 (16 Dec 2017 14:09) (112/66 - 142/79)  BP(mean): --  RR: 16 (16 Dec 2017 14:09) (16 - 18)  SpO2: 92% (16 Dec 2017 14:09) (89% - 97%)    I&O's Summary    15 Dec 2017 07:01  -  16 Dec 2017 07:00  --------------------------------------------------------  IN: 480 mL / OUT: 0 mL / NET: 480 mL    16 Dec 2017 07:01  -  16 Dec 2017 15:31  --------------------------------------------------------  IN: 300 mL / OUT: 0 mL / NET: 300 mL        On Exam:  TELE: SR   Constitutional: NAD, awake and alert, well-developed  HEENT: Moist Mucous Membranes, Anicteric  Pulmonary: Decreased breath sounds b/l. with scattered wheezing No rales, crackles  appreciated.   Cardiovascular: Regular, S1 and S2, No murmurs, rubs, gallops or clicks  Gastrointestinal: Bowel Sounds present, soft, nontender.   Lymph: No peripheral edema. No lymphadenopathy.  Skin: No visible rashes or ulcers.  Psych:  Mood & affect appropriate    LABS: All Labs Reviewed:                        12.3   11.7  )-----------( 247      ( 16 Dec 2017 09:23 )             38.8                         12.5   15.2  )-----------( 273      ( 15 Dec 2017 08:02 )             40.1                         12.9   11.8  )-----------( 256      ( 14 Dec 2017 05:32 )             39.7     16 Dec 2017 09:23    137    |  103    |  22     ----------------------------<  324    5.2     |  26     |  0.73   15 Dec 2017 08:02    137    |  104    |  21     ----------------------------<  371    4.3     |  22     |  0.86   14 Dec 2017 05:49    138    |  104    |  15     ----------------------------<  328    4.0     |  24     |  0.85     Ca    8.5        16 Dec 2017 09:23  Ca    8.0        15 Dec 2017 08:02  Ca    8.5        14 Dec 2017 05:49  Phos  2.9       15 Dec 2017 08:02  Phos  2.9       14 Dec 2017 05:49  Mg     2.3       15 Dec 2017 08:02  Mg     1.7       14 Dec 2017 05:49    TPro  6.5    /  Alb  2.9    /  TBili  0.4    /  DBili  x      /  AST  12     /  ALT  37     /  AlkPhos  113    15 Dec 2017 08:02  TPro  6.8    /  Alb  3.1    /  TBili  0.4    /  DBili  x      /  AST  18     /  ALT  45     /  AlkPhos  126    14 Dec 2017 05:49          Blood Culture: Organism --  Gram Stain Blood -- Gram Stain   No polymorphonuclear cells seen  No organisms seen  by cytocentrifuge  Specimen Source .CSF CSF/ QNS SPECIMEN GIVEN TO [facility]  Culture-Blood --    Organism --  Gram Stain Blood -- Gram Stain --  Specimen Source .Blood Blood-Peripheral  Culture-Blood --    Organism --  Gram Stain Blood -- Gram Stain --  Specimen Source .Urine Clean Catch (Midstream)  Culture-Blood --            RADIOLOGY:    < from: Xray Chest 2 Views PA/Lat (12.15.17 @ 09:25) >    EXAM:  CHEST PA & LAT                            PROCEDURE DATE:  12/15/2017          INTERPRETATION:  Follow-up.    PA lateral. Prior 12/13/2017.    There is interval clearing at the right base. No consolidation or   effusion noted. No change heart mediastinum. Calcific bursitis right   shoulder.    Impression: No active infiltrates.                TRAY PEREZ M.D., ATTENDING RADIOLOGIST  This document has been electronically signed. Dec 15 2017  9:44AM                < end of copied text >
Patient is a 47y old  Female who presents with a chief complaint of Fever , neck pain (14 Dec 2017 13:53)      Reason For Consult: dm2 uncontrolled    HPI:  Patient is a 47 year old female with PMH of asthma, right heart failure, diabetes, Essential HTN, MTHFR deficiency and homocysteinuria, Prinzmetal angina presents complaining of neck pain that has been constant for the past week. Patient went to [place], diagnosed with torticollis, and this ER yesterday for complaint but was sent home after imaging revealed no findings. Patient says after discharge yesterday , the pain which was initially localized to the right side but now involving the entire neck, got worse. She describes the pain as a 10/10 achy and sharp pain, nonradiating worsening pain. The pain is made worse with swallowing and coughing. She tried taking percocet, valium, hot and cold compresses for the  pain but nothing provides relief. Yesterday, she also started to experience some respiratory symptoms: nasal congestion, productive cough though which she is producing white mucus, fever of 100.6, chills, and dyspnea on exertion, and a headache. She's also felt nauseous but has not vomit.  She denies chest pain, dizziness, confusion, blurry vision, abdominal pain, diarrhea, hematochezia, hematuria, dysuria.    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 Patient has had 21 intubations in the past.     In ED, patient was afebrile but tachycardic. Blood pressure was elevated at 155/98. Remainder of vitals were within normal range. CBC revealed mild leukocytosis, BMP was unremarkable. Lumbar puncture showed a CSF glucose level of 85 and protein of 29. RVP positive for Rhinovirus. CXR showed  ill-defined patchy haziness right lower lobe. Present early pneumonia, follow-up to resolution is suggested.  There are no pleural effusion. Tortuous thoracic aorta, heart normal size.    There are no acute osseous abnormality. CT of neck showed findings compatible with prominent calcific tendinitis of the longus colli muscle with prevertebral soft tissue fullness; no localized fluid collection/abscess demonstrated. Patient given albuterol and duoneb treatment, dilaudid for pain, dose of vancomycin and NS. (13 Dec 2017 21:22)      PAST MEDICAL & SURGICAL HISTORY:  Essential hypertension, hypertension with unspecified goal  Diabetes mellitus type 2 in obese  Migraine  MTHFR (methylene THF reductase) deficiency and homocystinuria  Polycystic disease, ovaries  CVA (cerebral vascular accident)  GERD (gastroesophageal reflux disease)  Obesity  Prinzmetal Angina  Asthma: intubated in the past  H/O ovarian cystectomy  Salpingo-oophoritis: oophrectomy  History of tonsillectomy  History of Cholecystectomy  Status Post Nissen Fundoplication (with Gastrostomy Tube Placement)      FAMILY HISTORY:  No pertinent family history in first degree relatives        Social History:    MEDICATIONS  (STANDING):  ALBUTerol    90 MICROgram(s) HFA Inhaler 1 Puff(s) Inhalation every 4 hours  ALBUTerol/ipratropium for Nebulization 3 milliLiter(s) Nebulizer every 6 hours  aspirin enteric coated 81 milliGRAM(s) Oral daily  azithromycin  IVPB 500 milliGRAM(s) IV Intermittent every 24 hours  azithromycin  IVPB      calcium carbonate 500 mG (Tums) Chewable 1 Tablet(s) Chew daily  carvedilol 25 milliGRAM(s) Oral every 12 hours  cefTRIAXone   IVPB 1 Gram(s) IV Intermittent every 24 hours  dextrose 5%. 1000 milliLiter(s) (50 mL/Hr) IV Continuous <Continuous>  dextrose 50% Injectable 12.5 Gram(s) IV Push once  dextrose 50% Injectable 25 Gram(s) IV Push once  dextrose 50% Injectable 25 Gram(s) IV Push once  diltiazem    milliGRAM(s) Oral daily  enoxaparin Injectable 40 milliGRAM(s) SubCutaneous daily  folic acid 4 milliGRAM(s) Oral daily  insulin glargine Injectable (LANTUS) 30 Unit(s) SubCutaneous every morning  insulin lispro (HumaLOG) corrective regimen sliding scale   SubCutaneous Before meals and at bedtime  insulin lispro Injectable (HumaLOG) 7 Unit(s) SubCutaneous three times a day before meals  lisinopril 20 milliGRAM(s) Oral daily  loratadine 10 milliGRAM(s) Oral daily  mometasone 220 MICROgram(s) Inhaler 1 Puff(s) Inhalation daily  montelukast 10 milliGRAM(s) Oral daily  oseltamivir 75 milliGRAM(s) Oral daily  pantoprazole    Tablet 40 milliGRAM(s) Oral before breakfast  predniSONE   Tablet 60 milliGRAM(s) Oral every 12 hours  simvastatin 20 milliGRAM(s) Oral at bedtime  tiotropium 18 MICROgram(s) Capsule 1 Capsule(s) Inhalation daily    MEDICATIONS  (PRN):  dextrose Gel 1 Dose(s) Oral once PRN Blood Glucose LESS THAN 70 milliGRAM(s)/deciliter  furosemide    Tablet 40 milliGRAM(s) Oral every other day PRN edema  glucagon  Injectable 1 milliGRAM(s) IntraMuscular once PRN Glucose LESS THAN 70 milligrams/deciliter  HYDROmorphone  Injectable 0.5 milliGRAM(s) IV Push every 3 hours PRN Severe Pain (7 - 10)  ketorolac   Injectable 30 milliGRAM(s) IV Push every 8 hours PRN Moderate Pain (4 - 6)  metolazone 5 milliGRAM(s) Oral every 24 hours PRN water retention        T(C): 36.9 (12-16-17 @ 05:19), Max: 36.9 (12-15-17 @ 21:39)  HR: 83 (12-16-17 @ 05:19) (78 - 98)  BP: 142/79 (12-16-17 @ 05:19) (112/66 - 142/79)  RR: 18 (12-16-17 @ 05:19) (16 - 18)  SpO2: 95% (12-16-17 @ 05:19) (89% - 97%)  Wt(kg): --    PHYSICAL EXAM:  GENERAL: NAD, well-groomed, well-developed  HEAD:  Atraumatic, Normocephalic  NECK: Supple, No JVD, Normal thyroid  CHEST/LUNG: Clear to percussion bilaterally; No rales, rhonchi, wheezing, or rubs  HEART: Regular rate and rhythm; No murmurs, rubs, or gallops  ABDOMEN: Soft, Nontender, Nondistended; Bowel sounds present  EXTREMITIES:  2+ Peripheral Pulses, No clubbing, cyanosis, or edema  SKIN: No rashes or lesions    CAPILLARY BLOOD GLUCOSE      POCT Blood Glucose.: 311 mg/dL (16 Dec 2017 11:49)  POCT Blood Glucose.: 307 mg/dL (16 Dec 2017 07:48)  POCT Blood Glucose.: 406 mg/dL (15 Dec 2017 21:13)  POCT Blood Glucose.: 395 mg/dL (15 Dec 2017 16:14)                            12.3   11.7  )-----------( 247      ( 16 Dec 2017 09:23 )             38.8       CMP:  12-16 @ 09:23  SGPT --  Albumin --   Alk Phos --   Anion Gap 8   SGOT --   Total Bili --   BUN 22   Calcium Total 8.5   CO2 26   Chloride 103   Creatinine 0.73   eGFR if    eGFR if non AA 99   Glucose 324   Potassium 5.2   Protein --   Sodium 137      Thyroid Function Tests:      Diabetes Tests:       Radiology:

## 2023-05-17 NOTE — DISCHARGE NOTE ADULT - NSTOBACCOREFERRAL_GEN_A_CS
[Follow-Up: _____] : a [unfilled] follow-up visit [Family Member] : family member Patient declined information/non smoker

## 2023-08-11 ENCOUNTER — TRANSCRIPTION ENCOUNTER (OUTPATIENT)
Age: 53
End: 2023-08-11

## 2023-10-26 NOTE — ED PROVIDER NOTE - CPE EDP GASTRO NORM
Attempted to reach Camille Juarez for f/u. No answer. Message left with contact info.
normal...
Azithromycin Counseling:  I discussed with the patient the risks of azithromycin including but not limited to GI upset, allergic reaction, drug rash, diarrhea, and yeast infections.

## 2024-05-19 NOTE — DISCHARGE NOTE ADULT - THE PATIENT HAS
7:49 PM Recheck on patient. Discussed with patient ED findings and plan for discharge. Patient was given ED warnings, discharge instructions, and follow up information to go home with. Patient understands and agrees with plan for discharge. Any questions have been answered.    
no difficulties

## 2025-07-21 NOTE — PATIENT PROFILE ADULT. - PRO EXPECT LENGTH STAY
Price (Do Not Change): 0.00
Instructions: This plan will send the code FBSE to the PM system.  DO NOT or CHANGE the price.
Detail Level: Simple
unsure